# Patient Record
Sex: FEMALE | Race: BLACK OR AFRICAN AMERICAN | Employment: FULL TIME | URBAN - METROPOLITAN AREA
[De-identification: names, ages, dates, MRNs, and addresses within clinical notes are randomized per-mention and may not be internally consistent; named-entity substitution may affect disease eponyms.]

---

## 2017-01-05 ENCOUNTER — HOSPITAL ENCOUNTER (OUTPATIENT)
Dept: MAMMOGRAPHY | Age: 42
Discharge: HOME | End: 2017-01-05
Attending: INTERNAL MEDICINE
Payer: COMMERCIAL

## 2017-01-05 DIAGNOSIS — Z12.31 VISIT FOR SCREENING MAMMOGRAM: ICD-10-CM

## 2017-01-05 PROCEDURE — 77067 SCR MAMMO BI INCL CAD: CPT

## 2017-02-09 RX ORDER — HYDROCHLOROTHIAZIDE 12.5 MG/1
CAPSULE ORAL
Qty: 30 CAP | Refills: 1 | Status: SHIPPED | OUTPATIENT
Start: 2017-02-09 | End: 2017-04-10 | Stop reason: SDUPTHER

## 2017-06-21 ENCOUNTER — OFFICE VISIT (OUTPATIENT)
Dept: NEUROLOGY | Age: 42
End: 2017-06-21

## 2017-06-21 VITALS
SYSTOLIC BLOOD PRESSURE: 110 MMHG | BODY MASS INDEX: 29.01 KG/M2 | RESPIRATION RATE: 18 BRPM | OXYGEN SATURATION: 98 % | HEART RATE: 74 BPM | TEMPERATURE: 98.8 F | DIASTOLIC BLOOD PRESSURE: 70 MMHG | WEIGHT: 169 LBS

## 2017-06-21 DIAGNOSIS — G43.109 MIGRAINE WITH VERTIGO: ICD-10-CM

## 2017-06-21 DIAGNOSIS — G43.709 CHRONIC MIGRAINE W/O AURA W/O STATUS MIGRAINOSUS, NOT INTRACTABLE: Primary | ICD-10-CM

## 2017-06-21 RX ORDER — ZOLMITRIPTAN 2.5 MG/1
1 SPRAY, METERED NASAL
Qty: 1 EACH | Refills: 0 | Status: SHIPPED | COMMUNITY
Start: 2017-06-21 | End: 2017-10-13 | Stop reason: SDUPTHER

## 2017-06-21 RX ORDER — NORTRIPTYLINE HYDROCHLORIDE 10 MG/1
CAPSULE ORAL
Qty: 60 CAP | Refills: 2 | Status: SHIPPED | OUTPATIENT
Start: 2017-06-21 | End: 2017-10-13 | Stop reason: SDUPTHER

## 2017-06-21 NOTE — PATIENT INSTRUCTIONS
[facility]   Statement to Patients  April 1, 2014      In an effort to ensure the large volume of patient prescription refills is processed in the most efficient and expeditious manner, we are asking our patients to assist us by calling your Pharmacy for all prescription refills, this will include also your  Mail Order Pharmacy. The pharmacy will contact our office electronically to continue the refill process. Please do not wait until the last minute to call your pharmacy. We need at least 48 hours (2days) to fill prescriptions. We also encourage you to call your pharmacy before going to  your prescription to make sure it is ready. With regard to controlled substance prescription refill requests (narcotic refills) that need to be picked up at our office, we ask your cooperation by providing us with at least 72 hours (3days) notice that you will need a refill. We will not refill narcotic prescription refill requests after 4:00pm on any weekday, Monday through Thursday, or after 2:00pm on Fridays, or on the weekends. We encourage everyone to explore another way of getting your prescription refill request processed using [employer], our patient web portal through our electronic medical record system. [employer] is an efficient and effective way to communicate your medication request directly to the office and  downloadable as an rowena on your smart phone . [employer] also features a review functionality that allows you to view your medication list as well as leave messages for your physician. Are you ready to get connected? If so please review the attatched instructions or speak to any of our staff to get you set up right away! Thank you so much for your cooperation. Should you have any questions please contact our Practice Administrator.     The Physicians and Staff,  [facility]     Thank you for choosing [facility] and [facility] for your     care. You may receive a survey about your visit. We appreciate you taking time     to complete this survey as we use your feedback to improve our services. We     realize we are not perfect, but we strive to provide excellent care. Recurring Migraine Headache: Care Instructions  Your Care Instructions  Migraines are painful, throbbing headaches. They often start on one side of the head. They may cause nausea and vomiting and make you sensitive to light, sound, or smell. Some people may have only a few migraines throughout life. Others have them as often as several times a month. The goal of treatment is to reduce the number of migraines you have and relieve your symptoms. Even with treatment, you may continue to have migraines. You play an important role in dealing with your headaches. Work on avoiding things that seem to trigger your migraines. When you feel a headache coming on, act quickly to stop it before it gets worse. Follow-up care is a key part of your treatment and safety. Be sure to make and go to all appointments, and call your doctor if you are having problems. It's also a good idea to know your test results and keep a list of the medicines you take. How can you care for yourself at home? · Do not drive if you have taken a prescription pain medicine. · Rest in a quiet, dark room until your headache is gone. Close your eyes and try to relax or go to sleep. Do not watch TV or read. · Put a cold, moist cloth or cold pack on the painful area for 10 to 20 minutes at a time. Put a thin cloth between the cold pack and your skin. · Have someone gently massage your neck and shoulders. · Take your medicines exactly as prescribed. Call your doctor if you think you are having a problem with your medicine. You will get more details on the specific medicines your doctor prescribes.   To prevent migraines  · Keep a headache diary so you can figure out what triggers your headaches. Avoiding triggers may help you prevent headaches. Record when each headache began, how long it lasted, and what the pain was like. Use words like throbbing, aching, stabbing, or dull. Write down any other symptoms you had with the headache. These may include nausea, flashing lights or dark spots, or sensitivity to bright light or loud noise. Note if the headache occurred near your period. List anything that might have triggered the headache. Triggers may include certain foods (chocolate, cheese, wine) or odors, smoke, bright light, stress, or lack of sleep. · If your doctor has prescribed medicine for your migraines, take it as directed. You may have medicine that you take only when you get a migraine and medicine that you take all the time to help prevent migraines. ¨ If your doctor has prescribed medicine for when you get a headache, take it at the first sign of a migraine, unless your doctor has given you other instructions. ¨ If your doctor has prescribed medicine to prevent migraines, take it exactly as prescribed. Call your doctor if you think you are having a problem with your medicine. · Find healthy ways to deal with stress. Migraines are most common during or right after stressful times. Take time to relax before and after you do something that has caused a migraine in the past.  · Try to keep your muscles relaxed by keeping good posture. Check your jaw, face, neck, and shoulder muscles for tension. Try to relax them. When sitting at a desk, change positions often. Stretch for 30 seconds each hour. · Get regular sleep and exercise. · Eat regular meals, and avoid foods and drinks that often trigger migraines. These include chocolate and alcohol, especially red wine and port. Chemicals used in food, such as aspartame and monosodium glutamate (MSG), also can trigger migraines.  So can some food additives, such as those found in hot dogs, merchant, cold cuts, aged cheeses, and pickled foods.  · Limit caffeine by not drinking too much coffee, tea, or soda. Do not quit caffeine suddenly, because that can also give you migraines. · Do not smoke or allow others to smoke around you. If you need help quitting, talk to your doctor about stop-smoking programs and medicines. These can increase your chances of quitting for good. · If you are taking birth control pills or hormone therapy, talk to your doctor about whether they are triggering your migraines. When should you call for help? Call 911 anytime you think you may need emergency care. For example, call if:  · You have symptoms of a stroke. These may include:  ¨ Sudden numbness, tingling, weakness, or loss of movement in your face, arm, or leg, especially on only one side of your body. ¨ Sudden vision changes. ¨ Sudden trouble speaking. ¨ Sudden confusion or trouble understanding simple statements. ¨ Sudden problems with walking or balance. ¨ A sudden, severe headache that is different from past headaches. Call your doctor now or seek immediate medical care if:  · You develop a fever and a stiff neck. · You have new nausea and vomiting, or you cannot keep down food or liquids. Watch closely for changes in your health, and be sure to contact your doctor if:  · You have a headache that does not get better within 1 or 2 days. · Your headaches get worse or happen more often. Where can you learn more? Go to http://roly-sheela.info/. Enter V975 in the search box to learn more about \"Recurring Migraine Headache: Care Instructions. \"  Current as of: October 14, 2016  Content Version: 11.3  © 1457-5979 [employer]. Care instructions adapted under license by [employer] (which disclaims liability or warranty for this information).  If you have questions about a medical condition or this instruction, always ask your healthcare professional. [place] [address] any warranty or liability for your use of this information.

## 2017-06-21 NOTE — PROGRESS NOTES
Chief Complaint   Patient presents with    Migraine       Referred by: Dr. Sheldon RODRIGUEZ    Mike Soriano is a 78-year-old  with a history of hypertension and C-spine fusion here for headaches. She has a history of migraine headaches since she was approximately age 23 that over the years have progressed  And changed. In recent years her headaches are described as more a bilateral retro-orbital pressure with vertiginous symptoms. Worse with movement. Sometimes she does have bitemporal diffuse headache. She has light and sound sensitivity. She is tried Imitrex oral for severe headaches but that causes her to feel sick. She is tried over-the-counter medications and Percocet. No preceding aura. Sleep is challenging. Symptoms are worse during her menstrual cycle. Review of Systems   Eyes: Positive for photophobia. Neurological: Positive for dizziness and headaches. Psychiatric/Behavioral: The patient has insomnia. All other systems reviewed and are negative. Past Medical History:   Diagnosis Date    Dense breasts     GERD (gastroesophageal reflux disease)     Headache     Hypertension     Ill-defined condition     heart murmer    Nausea & vomiting     Sinus congestion      Family History   Problem Relation Age of Onset    Breast Cancer Mother 79     Social History     Social History    Marital status: SINGLE     Spouse name: N/A    Number of children: N/A    Years of education: N/A     Occupational History    Not on file.      Social History Main Topics    Smoking status: Never Smoker    Smokeless tobacco: Never Used    Alcohol use 0.6 oz/week     0 Standard drinks or equivalent, 1 Shots of liquor per week    Drug use: No    Sexual activity: Not on file     Other Topics Concern    Not on file     Social History Narrative     Current Outpatient Prescriptions   Medication Sig    nortriptyline (PAMELOR) 10 mg capsule 1 capsule at bedtime and increase to 2 capsules after 1 week    ZOLMitriptan (ZOMIG) 2.5 mg spry 1 Spray by Nasal route once as needed for up to 1 dose.  hydroCHLOROthiazide (MICROZIDE) 12.5 mg capsule TAKE ONE CAPSULE BY MOUTH EVERY DAY    herbal drugs (COLON HERBAL CLEANSER) cap Take 2 Caps by mouth daily.  omeprazole (PRILOSEC) 20 mg capsule Take 20 mg by mouth daily. Indications: GASTROESOPHAGEAL REFLUX    multivitamin (ONE A DAY) tablet Take 1 Tab by mouth daily. Indications: VITAMIN DEFICIENCY PREVENTION    oxyCODONE-acetaminophen (PERCOCET) 5-325 mg per tablet Take 1-2 Tabs by mouth every four (4) hours as needed for Pain. Max Daily Amount: 12 Tabs.  ondansetron (ZOFRAN ODT) 4 mg disintegrating tablet Take 1 Tab by mouth every eight (8) hours as needed for Nausea. No current facility-administered medications for this visit. Allergies   Allergen Reactions    Metronidazole Rash    Stadol [Butorphanol Tartrate] Palpitations         Neurologic Exam     Mental Status   Oriented to person, place, and time. Speech: speech is normal   Level of consciousness: alert    Cranial Nerves   Cranial nerves II through XII intact. Motor Exam   Muscle bulk: normal  Overall muscle tone: normal    Strength   Strength 5/5 throughout. Sensory Exam   Light touch normal.     Gait, Coordination, and Reflexes     Gait  Gait: normal    Tremor   Resting tremor: absent    Physical Exam   Constitutional: She is oriented to person, place, and time. She appears well-developed and well-nourished. Cardiovascular: Normal rate. Pulmonary/Chest: Effort normal.   Neurological: She is oriented to person, place, and time. She has normal strength. Gait normal.   Skin: Skin is warm and dry. Psychiatric: She has a normal mood and affect. Her speech is normal and behavior is normal.   Vitals reviewed.     Visit Vitals    /70    Pulse 74    Temp 98.8 °F (37.1 °C)    Resp 18    Wt 76.7 kg (169 lb)    SpO2 98%    BMI 29.01 kg/m2       Lab Results  Component Value Date/Time   WBC 9.8 09/30/2016 11:42 AM   HGB 12.9 09/30/2016 11:42 AM   HCT 39.7 09/30/2016 11:42 AM   PLATELET 257 27/87/8967 11:42 AM   MCV 88.0 09/30/2016 11:42 AM     Lab Results  Component Value Date/Time   Hemoglobin A1c 5.9 12/22/2015 03:53 PM   Glucose 84 09/30/2016 11:42 AM   LDL, calculated 112 12/22/2015 03:53 PM   Creatinine 0.88 09/30/2016 11:42 AM      Lab Results  Component Value Date/Time   Cholesterol, total 196 12/22/2015 03:53 PM   HDL Cholesterol 51 12/22/2015 03:53 PM   LDL, calculated 112 12/22/2015 03:53 PM   Triglyceride 163 12/22/2015 03:53 PM   Lab Results  Component Value Date/Time   ALT (SGPT) 10 12/22/2015 03:53 PM   AST (SGOT) 14 12/22/2015 03:53 PM   Alk. phosphatase 45 12/22/2015 03:53 PM   Bilirubin, total <0.2 12/22/2015 03:53 PM   Albumin 3.6 12/22/2015 03:53 PM   Protein, total 6.1 12/22/2015 03:53 PM   PLATELET 599 66/40/4151 11:42 AM                    CT Results (maximum last 3): No results found for this or any previous visit. MRI Results (maximum last 3): Results from [place] encounter on 09/14/16   MRI CERV SPINE WO CONT   Narrative INDICATION: neck pain neck pain M 54.2 cm at base of neck with shooting pains. Symptoms over last 2 months. COMPARISON: None    EXAM: Sagittal T1-weighted spin-echo, sagittal T2-weighted fast spin-echo,  sagittal inversion recovery, axial T1-weighted spin-echo and axial gradient echo  MR images of the cervical spine are obtained. The study was performed on an open  configuration 0.3 Marisela (low field) strength MR imaging system. FINDINGS: Cord signal is normal. The visualized portions of the brainstem and  cerebellum are normal. There is normal vertebral body height and bone signal.  There is straightening of cervical lordosis with borderline retrolisthesis at  C3-4 and borderline anterolisthesis at C5-6. No paraspinal soft tissue mass is  demonstrated. C2-3: Mild diminished disc height.  Moderate left paracentral disc herniation  with effacement of anterior CSF space and contact with the anterior margin of  cord with subtle flattening. No facet arthropathy or foraminal stenosis. C3-4: Mild-moderate disc space narrowing. Moderate-sized left paracentral disc  herniation with posterior displacement of cord and mild-moderate left  paracentral and anterolateral cord compression. Mild right facet osteoarthrosis. Mild right foraminal stenosis. C4-5: Nearly normal disc height. Mild central disc bulging. No facet  arthropathy, canal stenosis or foraminal stenosis. C5-6: Mild disc space narrowing. Large central and right paracentral disc  herniation with severe cord compression, greater on the right. No facet  arthropathy. No substantial foraminal stenosis. C6-7: Nearly normal disc height. Small to moderate central disc herniation  effacing the anterior CSF space and mildly displacing the cord posteriorly  though without compression. No facet arthropathy or foraminal stenosis. C7-T1 and T1-2: Normal discs and facets. Impression IMPRESSION:   1. Large central and right paracentral disc herniation at C5-6 with severe cord  compression. 2. Moderate left paracentral disc herniation at C3-4 with mild-moderate cord  compression. 3. Moderate left paracentral disc herniation at C2-3 with minimal anterior cord  compression. 4. Small-moderate central disc herniation at C6-7 without cord compression. PET Results (maximum last 3): No results found for this or any previous visit. Assessment and Plan   Deidra Rodriguez was seen today for migraine.     Diagnoses and all orders for this visit:    Chronic migraine w/o aura w/o status migrainosus, not intractable    Migraine with vertigo    Other orders  -     nortriptyline (PAMELOR) 10 mg capsule; 1 capsule at bedtime and increase to 2 capsules after 1 week  -     ZOLMitriptan (ZOMIG) 2.5 mg spry; 1 Spray by Nasal route once as needed for up to 1 dose.      43-year-old woman with chronic migraine headaches. She is having 2 or 3 events per week. I recommend a trial of nortriptyline at bedtime which may also help improve her sleep quality. Side effects discussed. A trial of Zomig nasal spray since the oral Imitrex caused worsening symptoms. She has a normal exam and no headache change over the past few years. No need for imaging at this time. I would like her to follow-up in 3 months. A notice of this visit/encounter being completed has been sent electronically to the patient's PCP and/or referring provider.      2 [facility] [address]  Diplomate ROSEMARYN

## 2017-06-21 NOTE — MR AVS SNAPSHOT
Visit Information Date & Time Provider Department Dept. Phone Encounter #  
 6/21/2017  8:00 AM Kg Patel DO [facility] at [address] [id number] Follow-up Instructions Return in about 3 months (around 9/21/2017). Routing History Upcoming Health Maintenance Date Due DTaP/Tdap/Td series (1 - Tdap) 10/9/1994 PAP AKA CERVICAL CYTOLOGY 10/9/1994 INFLUENZA AGE 9 TO ADULT 8/1/2017 Allergies as of 6/21/2017  Review Complete On: 6/21/2017 By: Frederick Owens LPN Severity Noted Reaction Type Reactions Metronidazole  12/22/2015    Rash Stadol [Butorphanol Tartrate]  12/22/2015    Palpitations Current Immunizations  Never Reviewed No immunizations on file. Not reviewed this visit You Were Diagnosed With   
  
 Codes Comments Chronic migraine w/o aura w/o status migrainosus, not intractable    -  Primary ICD-10-CM: D00.378 ICD-9-CM: 346.70 Migraine with vertigo     ICD-10-CM: G43.109 ICD-9-CM: 346.00 Vitals BP Pulse Temp Resp Weight(growth percentile) SpO2  
 110/70 74 98.8 °F (37.1 °C) 18 169 lb (76.7 kg) 98% BMI OB Status Smoking Status 29.01 kg/m2 Having regular periods Never Smoker Vitals History BMI and BSA Data Body Mass Index Body Surface Area  
 29.01 kg/m 2 1.86 m 2 Preferred Pharmacy Pharmacy Name Phone [id number] Zeb Monique, [address] [place] [address] [phone] Your Updated Medication List  
  
   
This list is accurate as of: 6/21/17  8:31 AM.  Always use your most recent med list.  
  
  
  
  
 COLON HERBAL CLEANSER Cap Generic drug:  herbal drugs Take 2 Caps by mouth daily. hydroCHLOROthiazide 12.5 mg capsule Commonly known as:  Velton Shadow TAKE ONE CAPSULE BY MOUTH EVERY DAY  
  
 multivitamin tablet Commonly known as:  ONE A DAY  
 Take 1 Tab by mouth daily. Indications: VITAMIN DEFICIENCY PREVENTION  
  
 nortriptyline 10 mg capsule Commonly known as:  PAMELOR  
1 capsule at bedtime and increase to 2 capsules after 1 week  
  
 omeprazole 20 mg capsule Commonly known as:  PRILOSEC Take 20 mg by mouth daily. Indications: GASTROESOPHAGEAL REFLUX  
  
 ondansetron 4 mg disintegrating tablet Commonly known as:  ZOFRAN ODT Take 1 Tab by mouth every eight (8) hours as needed for Nausea. oxyCODONE-acetaminophen 5-325 mg per tablet Commonly known as:  PERCOCET Take 1-2 Tabs by mouth every four (4) hours as needed for Pain. Max Daily Amount: 12 Tabs. ZOLMitriptan 2.5 mg Spry Commonly known as:  ZOMIG  
1 Spray by Nasal route once as needed for up to 1 dose. Prescriptions Sent to Pharmacy Refills  
 nortriptyline (PAMELOR) 10 mg capsule 2 Si capsule at bedtime and increase to 2 capsules after 1 week Class: Normal  
 Pharmacy: [facility] [address] [place] [address], [address] [place] [address] #: [phone] Follow-up Instructions Return in about 3 months (around 2017). Patient Instructions PRESCRIPTION REFILL POLICY [facility] Statement to Patients 2014 In an effort to ensure the large volume of patient prescription refills is processed in the most efficient and expeditious manner, we are asking our patients to assist us by calling your Pharmacy for all prescription refills, this will include also your  Mail Order Pharmacy. The pharmacy will contact our office electronically to continue the refill process. Please do not wait until the last minute to call your pharmacy. We need at least 48 hours (2days) to fill prescriptions. We also encourage you to call your pharmacy before going to  your prescription to make sure it is ready. With regard to controlled substance prescription refill requests (narcotic refills) that need to be picked up at our office, we ask your cooperation by providing us with at least 72 hours (3days) notice that you will need a refill. We will not refill narcotic prescription refill requests after 4:00pm on any weekday, Monday through Thursday, or after 2:00pm on Fridays, or on the weekends. We encourage everyone to explore another way of getting your prescription refill request processed using [employer], our patient web portal through our electronic medical record system. The [employer]ebra is an efficient and effective way to communicate your medication request directly to the office and  downloadable as an rowena on your smart phone . The [employer]ebra also features a review functionality that allows you to view your medication list as well as leave messages for your physician. Are you ready to get connected? If so please review the attatched instructions or speak to any of our staff to get you set up right away! Thank you so much for your cooperation. Should you have any questions please contact our Practice Administrator. The Physicians and Staff,  [facility] Thank you for choosing [facility] and [facility] for your  
 
care. You may receive a survey about your visit. We appreciate you taking time  
 
to complete this survey as we use your feedback to improve our services. We  
 
realize we are not perfect, but we strive to provide excellent care. Recurring Migraine Headache: Care Instructions Your Care Instructions Migraines are painful, throbbing headaches. They often start on one side of the head. They may cause nausea and vomiting and make you sensitive to light, sound, or smell. Some people may have only a few migraines throughout life. Others have them as often as several times a month.  
The goal of treatment is to reduce the number of migraines you have and relieve your symptoms. Even with treatment, you may continue to have migraines. You play an important role in dealing with your headaches. Work on avoiding things that seem to trigger your migraines. When you feel a headache coming on, act quickly to stop it before it gets worse. Follow-up care is a key part of your treatment and safety. Be sure to make and go to all appointments, and call your doctor if you are having problems. It's also a good idea to know your test results and keep a list of the medicines you take. How can you care for yourself at home? · Do not drive if you have taken a prescription pain medicine. · Rest in a quiet, dark room until your headache is gone. Close your eyes and try to relax or go to sleep. Do not watch TV or read. · Put a cold, moist cloth or cold pack on the painful area for 10 to 20 minutes at a time. Put a thin cloth between the cold pack and your skin. · Have someone gently massage your neck and shoulders. · Take your medicines exactly as prescribed. Call your doctor if you think you are having a problem with your medicine. You will get more details on the specific medicines your doctor prescribes. To prevent migraines · Keep a headache diary so you can figure out what triggers your headaches. Avoiding triggers may help you prevent headaches. Record when each headache began, how long it lasted, and what the pain was like. Use words like throbbing, aching, stabbing, or dull. Write down any other symptoms you had with the headache. These may include nausea, flashing lights or dark spots, or sensitivity to bright light or loud noise. Note if the headache occurred near your period. List anything that might have triggered the headache. Triggers may include certain foods (chocolate, cheese, wine) or odors, smoke, bright light, stress, or lack of sleep.  
· If your doctor has prescribed medicine for your migraines, take it as directed. You may have medicine that you take only when you get a migraine and medicine that you take all the time to help prevent migraines. ¨ If your doctor has prescribed medicine for when you get a headache, take it at the first sign of a migraine, unless your doctor has given you other instructions. ¨ If your doctor has prescribed medicine to prevent migraines, take it exactly as prescribed. Call your doctor if you think you are having a problem with your medicine. · Find healthy ways to deal with stress. Migraines are most common during or right after stressful times. Take time to relax before and after you do something that has caused a migraine in the past. 
· Try to keep your muscles relaxed by keeping good posture. Check your jaw, face, neck, and shoulder muscles for tension. Try to relax them. When sitting at a desk, change positions often. Stretch for 30 seconds each hour. · Get regular sleep and exercise. · Eat regular meals, and avoid foods and drinks that often trigger migraines. These include chocolate and alcohol, especially red wine and port. Chemicals used in food, such as aspartame and monosodium glutamate (MSG), also can trigger migraines. So can some food additives, such as those found in hot dogs, merchant, cold cuts, aged cheeses, and pickled foods. · Limit caffeine by not drinking too much coffee, tea, or soda. Do not quit caffeine suddenly, because that can also give you migraines. · Do not smoke or allow others to smoke around you. If you need help quitting, talk to your doctor about stop-smoking programs and medicines. These can increase your chances of quitting for good. · If you are taking birth control pills or hormone therapy, talk to your doctor about whether they are triggering your migraines. When should you call for help? Call 911 anytime you think you may need emergency care. For example, call if: 
· You have symptoms of a stroke. These may include: ¨ Sudden numbness, tingling, weakness, or loss of movement in your face, arm, or leg, especially on only one side of your body. ¨ Sudden vision changes. ¨ Sudden trouble speaking. ¨ Sudden confusion or trouble understanding simple statements. ¨ Sudden problems with walking or balance. ¨ A sudden, severe headache that is different from past headaches. Call your doctor now or seek immediate medical care if: 
· You develop a fever and a stiff neck. · You have new nausea and vomiting, or you cannot keep down food or liquids. Watch closely for changes in your health, and be sure to contact your doctor if: 
· You have a headache that does not get better within 1 or 2 days. · Your headaches get worse or happen more often. Where can you learn more? Go to http://roly-sheela.info/. Enter V975 in the search box to learn more about \"Recurring Migraine Headache: Care Instructions. \" Current as of: October 14, 2016 Content Version: 11.3 © 1402-9471 [employer]. Care instructions adapted under license by [employer] (which disclaims liability or warranty for this information). If you have questions about a medical condition or this instruction, always ask your healthcare professional. [place] [address] any warranty or liability for your use of this information. Introducing [facility]! Gerald Lucio introduces [employer] patient portal. Now you can access parts of your medical record, email your doctor's office, and request medication refills online. 1. In your internet browser, go to [email or web address]. [email or web address] 2. Click on the First Time User? Click Here link in the Sign In box. You will see the New Member Sign Up page. 3. Enter your [employer] Access Code exactly as it appears below. You will not need to use this code after youve completed the sign-up process.  If you do not sign up before the expiration date, you must request a new code. 
 
· [employer] Access Code: [email or web address] Expires: 9/19/2017  8:06 AM 
 
4. Enter the last four digits of your Social Security Number (xxxx) and Date of Birth (mm/dd/yyyy) as indicated and click Submit. You will be taken to the next sign-up page. 5. Create a [employer] ID. This will be your [employer] login ID and cannot be changed, so think of one that is secure and easy to remember. 6. Create a [employer] password. You can change your password at any time. 7. Enter your Password Reset Question and Answer. This can be used at a later time if you forget your password. 8. Enter your e-mail address. You will receive e-mail notification when new information is available in [address] 9. Click Sign Up. You can now view and download portions of your medical record. 10. Click the Download Summary menu link to download a portable copy of your medical information. If you have questions, please visit the Frequently Asked Questions section of the [employer] website. Remember, [employer] is NOT to be used for urgent needs. For medical emergencies, dial 911. Now available from your iPhone and Android! Please provide this summary of care documentation to your next provider. Your primary care clinician is listed as Clare Ferrara. If you have any questions after today's visit, please call [phone].

## 2017-08-15 ENCOUNTER — HOSPITAL ENCOUNTER (EMERGENCY)
Age: 42
Discharge: HOME | End: 2017-08-15
Attending: EMERGENCY MEDICINE | Admitting: EMERGENCY MEDICINE
Payer: COMMERCIAL

## 2017-08-15 VITALS
DIASTOLIC BLOOD PRESSURE: 78 MMHG | WEIGHT: 175.6 LBS | HEART RATE: 74 BPM | OXYGEN SATURATION: 100 % | HEIGHT: 64 IN | RESPIRATION RATE: 18 BRPM | SYSTOLIC BLOOD PRESSURE: 122 MMHG | BODY MASS INDEX: 29.98 KG/M2 | TEMPERATURE: 98.1 F

## 2017-08-15 DIAGNOSIS — H10.11 ACUTE ATOPIC CONJUNCTIVITIS OF RIGHT EYE: ICD-10-CM

## 2017-08-15 DIAGNOSIS — I10 ESSENTIAL HYPERTENSION: ICD-10-CM

## 2017-08-15 DIAGNOSIS — R51.9 HEADACHE, UNSPECIFIED HEADACHE TYPE: Primary | ICD-10-CM

## 2017-08-15 LAB
ALBUMIN SERPL BCP-MCNC: 3.1 G/DL (ref 3.5–5)
ALBUMIN/GLOB SERPL: 0.8 {RATIO} (ref 1.1–2.2)
ALP SERPL-CCNC: 49 U/L (ref 45–117)
ALT SERPL-CCNC: 18 U/L (ref 12–78)
ANION GAP BLD CALC-SCNC: 6 MMOL/L (ref 5–15)
APPEARANCE UR: ABNORMAL
AST SERPL W P-5'-P-CCNC: 44 U/L (ref 15–37)
BASOPHILS # BLD AUTO: 0 K/UL (ref 0–0.1)
BASOPHILS # BLD: 0 % (ref 0–1)
BILIRUB SERPL-MCNC: 0.2 MG/DL (ref 0.2–1)
BILIRUB UR QL: NEGATIVE
BUN SERPL-MCNC: 10 MG/DL (ref 6–20)
BUN/CREAT SERPL: 10 (ref 12–20)
CALCIUM SERPL-MCNC: 8.4 MG/DL (ref 8.5–10.1)
CHLORIDE SERPL-SCNC: 104 MMOL/L (ref 97–108)
CO2 SERPL-SCNC: 24 MMOL/L (ref 21–32)
COLOR UR: ABNORMAL
CREAT SERPL-MCNC: 1 MG/DL (ref 0.55–1.02)
EOSINOPHIL # BLD: 0.1 K/UL (ref 0–0.4)
EOSINOPHIL NFR BLD: 1 % (ref 0–7)
ERYTHROCYTE [DISTWIDTH] IN BLOOD BY AUTOMATED COUNT: 14 % (ref 11.5–14.5)
GLOBULIN SER CALC-MCNC: 4 G/DL (ref 2–4)
GLUCOSE SERPL-MCNC: 88 MG/DL (ref 65–100)
GLUCOSE UR STRIP.AUTO-MCNC: NEGATIVE MG/DL
HCT VFR BLD AUTO: 37.6 % (ref 35–47)
HGB BLD-MCNC: 12 G/DL (ref 11.5–16)
HGB UR QL STRIP: NEGATIVE
KETONES UR QL STRIP.AUTO: NEGATIVE MG/DL
LEUKOCYTE ESTERASE UR QL STRIP.AUTO: NEGATIVE
LYMPHOCYTES # BLD AUTO: 27 % (ref 12–49)
LYMPHOCYTES # BLD: 2.3 K/UL (ref 0.8–3.5)
MCH RBC QN AUTO: 27.9 PG (ref 26–34)
MCHC RBC AUTO-ENTMCNC: 31.9 G/DL (ref 30–36.5)
MCV RBC AUTO: 87.4 FL (ref 80–99)
MONOCYTES # BLD: 0.6 K/UL (ref 0–1)
MONOCYTES NFR BLD AUTO: 7 % (ref 5–13)
NEUTS SEG # BLD: 5.5 K/UL (ref 1.8–8)
NEUTS SEG NFR BLD AUTO: 65 % (ref 32–75)
NITRITE UR QL STRIP.AUTO: NEGATIVE
PH UR STRIP: 6.5 [PH] (ref 5–8)
PLATELET # BLD AUTO: 297 K/UL (ref 150–400)
POTASSIUM SERPL-SCNC: 4.9 MMOL/L (ref 3.5–5.1)
PROT SERPL-MCNC: 7.1 G/DL (ref 6.4–8.2)
PROT UR STRIP-MCNC: NEGATIVE MG/DL
RBC # BLD AUTO: 4.3 M/UL (ref 3.8–5.2)
SODIUM SERPL-SCNC: 134 MMOL/L (ref 136–145)
SP GR UR REFRACTOMETRY: 1.01 (ref 1–1.03)
UROBILINOGEN UR QL STRIP.AUTO: 0.2 EU/DL (ref 0.2–1)
WBC # BLD AUTO: 8.5 K/UL (ref 3.6–11)

## 2017-08-15 PROCEDURE — 36415 COLL VENOUS BLD VENIPUNCTURE: CPT | Performed by: EMERGENCY MEDICINE

## 2017-08-15 PROCEDURE — 80053 COMPREHEN METABOLIC PANEL: CPT | Performed by: EMERGENCY MEDICINE

## 2017-08-15 PROCEDURE — 81003 URINALYSIS AUTO W/O SCOPE: CPT | Performed by: EMERGENCY MEDICINE

## 2017-08-15 PROCEDURE — 74011250637 HC RX REV CODE- 250/637: Performed by: EMERGENCY MEDICINE

## 2017-08-15 PROCEDURE — 85025 COMPLETE CBC W/AUTO DIFF WBC: CPT | Performed by: EMERGENCY MEDICINE

## 2017-08-15 PROCEDURE — 96361 HYDRATE IV INFUSION ADD-ON: CPT

## 2017-08-15 PROCEDURE — 99284 EMERGENCY DEPT VISIT MOD MDM: CPT

## 2017-08-15 PROCEDURE — 74011250636 HC RX REV CODE- 250/636: Performed by: EMERGENCY MEDICINE

## 2017-08-15 PROCEDURE — 96374 THER/PROPH/DIAG INJ IV PUSH: CPT

## 2017-08-15 PROCEDURE — 96375 TX/PRO/DX INJ NEW DRUG ADDON: CPT

## 2017-08-15 RX ORDER — CETIRIZINE HCL 10 MG
10 TABLET ORAL
Status: COMPLETED | OUTPATIENT
Start: 2017-08-15 | End: 2017-08-15

## 2017-08-15 RX ORDER — KETOROLAC TROMETHAMINE 30 MG/ML
30 INJECTION, SOLUTION INTRAMUSCULAR; INTRAVENOUS
Status: COMPLETED | OUTPATIENT
Start: 2017-08-15 | End: 2017-08-15

## 2017-08-15 RX ORDER — PROCHLORPERAZINE EDISYLATE 5 MG/ML
10 INJECTION INTRAMUSCULAR; INTRAVENOUS
Status: COMPLETED | OUTPATIENT
Start: 2017-08-15 | End: 2017-08-15

## 2017-08-15 RX ORDER — DIPHENHYDRAMINE HYDROCHLORIDE 50 MG/ML
12.5 INJECTION, SOLUTION INTRAMUSCULAR; INTRAVENOUS
Status: COMPLETED | OUTPATIENT
Start: 2017-08-15 | End: 2017-08-15

## 2017-08-15 RX ADMIN — CETIRIZINE HYDROCHLORIDE 10 MG: 10 TABLET, FILM COATED ORAL at 14:16

## 2017-08-15 RX ADMIN — PROCHLORPERAZINE EDISYLATE 10 MG: 5 INJECTION INTRAMUSCULAR; INTRAVENOUS at 14:17

## 2017-08-15 RX ADMIN — KETOROLAC TROMETHAMINE 30 MG: 30 INJECTION, SOLUTION INTRAMUSCULAR at 14:17

## 2017-08-15 RX ADMIN — METHYLPREDNISOLONE SODIUM SUCCINATE 125 MG: 125 INJECTION, POWDER, FOR SOLUTION INTRAMUSCULAR; INTRAVENOUS at 14:17

## 2017-08-15 RX ADMIN — SODIUM CHLORIDE 1000 ML: 900 INJECTION, SOLUTION INTRAVENOUS at 14:16

## 2017-08-15 RX ADMIN — DIPHENHYDRAMINE HYDROCHLORIDE 12.5 MG: 50 INJECTION, SOLUTION INTRAMUSCULAR; INTRAVENOUS at 14:17

## 2017-08-15 NOTE — ED NOTES
Pt conts to remain drowsy, Pt awakens to verbal requests, Pt is requesting to wait till 1700 hours to reevaluate, will check at 1700 to see Pts status.

## 2017-08-15 NOTE — ED PROVIDER NOTES
Patient is a 35 y.o. female presenting with hypertension and eye problem. Hypertension    Associated symptoms include headaches. Pertinent negatives include no neck pain, no shortness of breath and no vomiting. Eye Problem    Associated symptoms include discharge. Pertinent negatives include no vomiting. Pt states that she has had a migraine headache for 2-3 days accompanied by intermittent right eye watering and elevated blood pressure. Denies fever, cold symptoms, neck pain, visual changes, focal weakness or rash. Denies any difficulty breathing, difficulty swallowing, SOB, chest pain or abdominal pain. Denies any nausea, vomiting or diarrhea. Pt. Reports taking excedrin without relief. Old charts reviewed. Past Medical History:   Diagnosis Date    Dense breasts     GERD (gastroesophageal reflux disease)     Headache     Hypertension     Ill-defined condition     heart murmer    Nausea & vomiting     Sinus congestion        Past Surgical History:   Procedure Laterality Date    HX BUNIONECTOMY Bilateral     HX GYN  2002    D&C    US GUIDE ASP BREAST RT CYST W NDL Right 4 years ago ? Benign         Family History:   Problem Relation Age of Onset    Breast Cancer Mother 79       Social History     Social History    Marital status: SINGLE     Spouse name: N/A    Number of children: N/A    Years of education: N/A     Occupational History    Not on file. Social History Main Topics    Smoking status: Never Smoker    Smokeless tobacco: Never Used    Alcohol use 0.6 oz/week     0 Standard drinks or equivalent, 1 Shots of liquor per week    Drug use: No    Sexual activity: Not on file     Other Topics Concern    Not on file     Social History Narrative         ALLERGIES: Metronidazole and Stadol [butorphanol tartrate]    Review of Systems   Constitutional: Negative for activity change and appetite change. HENT: Negative for facial swelling, sore throat and trouble swallowing. Eyes: Positive for discharge. Respiratory: Negative for shortness of breath. Cardiovascular: Negative. Gastrointestinal: Negative for abdominal pain, diarrhea and vomiting. Genitourinary: Negative for dysuria. Musculoskeletal: Negative for back pain and neck pain. Skin: Negative for color change. Neurological: Positive for headaches. Psychiatric/Behavioral: Negative. Vitals:    08/15/17 1328   BP: 158/88   Pulse: 70   Resp: 16   Temp: 98.6 °F (37 °C)   SpO2: 97%   Weight: 79.7 kg (175 lb 9.6 oz)   Height: 5' 4\" (1.626 m)            Physical Exam   Constitutional: She is oriented to person, place, and time. She appears well-nourished. Black female; non smoker   HENT:   Head: Normocephalic. Mouth/Throat: Oropharynx is clear and moist.   Eyes: EOM are normal. Pupils are equal, round, and reactive to light. Right eye exhibits no discharge. Left eye exhibits no discharge. Mild conjunctival injection right eye    Neck: Normal range of motion. Neck supple. Cardiovascular: Normal rate and regular rhythm. Pulmonary/Chest: Effort normal and breath sounds normal.   Abdominal: Soft. Bowel sounds are normal.   Musculoskeletal: Normal range of motion. Neurological: She is alert and oriented to person, place, and time. Skin: Skin is warm and dry. No rash noted. Nursing note and vitals reviewed. MDM  ED Course       Procedures    Pt has been re-examined and is feeling much better; reports headache free. 2:56 PM  Patient's results and plan of care have been reviewed with her. Patient has verbally conveyed her understanding and agreement of her signs, symptoms, diagnosis, treatment and prognosis and additionally agrees to follow up as recommended or return to the Emergency Room should her condition change prior to follow-up.   Discharge instructions have also been provided to the patient with some educational information regarding her diagnosis as well a list of reasons why she would want to return to the ER prior to her follow-up appointment should her condition change. Liseth Wilkinson NP

## 2017-08-15 NOTE — DISCHARGE INSTRUCTIONS
We hope that we have addressed all of your medical concerns. The examination and treatment you received in the Emergency Department were for an emergent problem and were not intended as complete care. It is important that you follow up with your healthcare provider(s) for ongoing care. If your symptoms worsen or do not improve as expected, and you are unable to reach your usual health care provider(s), you should return to the Emergency Department. Today's healthcare is undergoing tremendous change, and patient satisfaction surveys are one of the many tools to assess the quality of medical care. You may receive a survey from the [facility] regarding your experience in the Emergency Department. I hope that your experience has been completely positive, particularly the medical care that I provided. As such, please participate in the survey; anything less than excellent does not meet my expectations or intentions. [address] [place] [address] participate in nationally recognized quality of care measures. If your blood pressure is greater than 120/80, as reported below, we urge that you seek medical care to address the potential of high blood pressure, commonly known as hypertension. Hypertension can be hereditary or can be caused by certain medical conditions, pain, stress, or \"white coat syndrome. \"       Please make an appointment with your health care provider(s) for follow up of your Emergency Department visit. VITALS:   Patient Vitals for the past 8 hrs:   Temp Pulse Resp BP SpO2   08/15/17 1328 98.6 °F (37 °C) 70 16 158/88 97 %          Thank you for allowing us to provide you with medical care today. We realize that you have many choices for your emergency care needs. Please choose us in the future for any continued health care needs.       Jozef Ceballos NP    [facility].   Office: [phone]            Recent Results (from the past 24 hour(s))   URINALYSIS W/ RFLX MICROSCOPIC    Collection Time: 08/15/17  2:10 PM   Result Value Ref Range    Color YELLOW/STRAW      Appearance CLOUDY (A) CLEAR      Specific gravity 1.006 1.003 - 1.030      pH (UA) 6.5 5.0 - 8.0      Protein NEGATIVE  NEG mg/dL    Glucose NEGATIVE  NEG mg/dL    Ketone NEGATIVE  NEG mg/dL    Bilirubin NEGATIVE  NEG      Blood NEGATIVE  NEG      Urobilinogen 0.2 0.2 - 1.0 EU/dL    Nitrites NEGATIVE  NEG      Leukocyte Esterase NEGATIVE  NEG     CBC WITH AUTOMATED DIFF    Collection Time: 08/15/17  2:10 PM   Result Value Ref Range    WBC 8.5 3.6 - 11.0 K/uL    RBC 4.30 3.80 - 5.20 M/uL    HGB 12.0 11.5 - 16.0 g/dL    HCT 37.6 35.0 - 47.0 %    MCV 87.4 80.0 - 99.0 FL    MCH 27.9 26.0 - 34.0 PG    MCHC 31.9 30.0 - 36.5 g/dL    RDW 14.0 11.5 - 14.5 %    PLATELET 944 686 - 677 K/uL    NEUTROPHILS 65 32 - 75 %    LYMPHOCYTES 27 12 - 49 %    MONOCYTES 7 5 - 13 %    EOSINOPHILS 1 0 - 7 %    BASOPHILS 0 0 - 1 %    ABS. NEUTROPHILS 5.5 1.8 - 8.0 K/UL    ABS. LYMPHOCYTES 2.3 0.8 - 3.5 K/UL    ABS. MONOCYTES 0.6 0.0 - 1.0 K/UL    ABS. EOSINOPHILS 0.1 0.0 - 0.4 K/UL    ABS. BASOPHILS 0.0 0.0 - 0.1 K/UL   METABOLIC PANEL, COMPREHENSIVE    Collection Time: 08/15/17  2:10 PM   Result Value Ref Range    Sodium 134 (L) 136 - 145 mmol/L    Potassium 4.9 3.5 - 5.1 mmol/L    Chloride 104 97 - 108 mmol/L    CO2 24 21 - 32 mmol/L    Anion gap 6 5 - 15 mmol/L    Glucose 88 65 - 100 mg/dL    BUN 10 6 - 20 MG/DL    Creatinine 1.00 0.55 - 1.02 MG/DL    BUN/Creatinine ratio 10 (L) 12 - 20      GFR est AA >60 >60 ml/min/1.73m2    GFR est non-AA >60 >60 ml/min/1.73m2    Calcium 8.4 (L) 8.5 - 10.1 MG/DL    Bilirubin, total 0.2 0.2 - 1.0 MG/DL    ALT (SGPT) 18 12 - 78 U/L    AST (SGOT) 44 (H) 15 - 37 U/L    Alk.  phosphatase 49 45 - 117 U/L    Protein, total 7.1 6.4 - 8.2 g/dL    Albumin 3.1 (L) 3.5 - 5.0 g/dL    Globulin 4.0 2.0 - 4.0 g/dL    A-G Ratio 0.8 (L) 1.1 - 2.2         No results found. Pinkeye: Care Instructions  Your Care Instructions    Pinkeye is redness and swelling of the eye surface and the conjunctiva (the lining of the eyelid and the covering of the white part of the eye). Pinkeye is also called conjunctivitis. Pinkeye is often caused by infection with bacteria or a virus. Dry air, allergies, smoke, and chemicals are other common causes. Pinkeye often clears on its own in 7 to 10 days. Antibiotics only help if the pinkeye is caused by bacteria. Pinkeye caused by infection spreads easily. If an allergy or chemical is causing pinkeye, it will not go away unless you can avoid whatever is causing it. Follow-up care is a key part of your treatment and safety. Be sure to make and go to all appointments, and call your doctor if you are having problems. It's also a good idea to know your test results and keep a list of the medicines you take. How can you care for yourself at home? · Wash your hands often. Always wash them before and after you treat pinkeye or touch your eyes or face. · Use moist cotton or a clean, wet cloth to remove crust. Wipe from the inside corner of the eye to the outside. Use a clean part of the cloth for each wipe. · Put cold or warm wet cloths on your eye a few times a day if the eye hurts. · Do not wear contact lenses or eye makeup until the pinkeye is gone. Throw away any eye makeup you were using when you got pinkeye. Clean your contacts and storage case. If you wear disposable contacts, use a new pair when your eye has cleared and it is safe to wear contacts again. · If the doctor gave you antibiotic ointment or eyedrops, use them as directed. Use the medicine for as long as instructed, even if your eye starts looking better soon. Keep the bottle tip clean, and do not let it touch the eye area. · To put in eyedrops or ointment:  ¨ Tilt your head back, and pull your lower eyelid down with one finger.   ¨ Drop or squirt the medicine inside the lower lid. ¨ Close your eye for 30 to 60 seconds to let the drops or ointment move around. ¨ Do not touch the ointment or dropper tip to your eyelashes or any other surface. · Do not share towels, pillows, or washcloths while you have pinkeye. When should you call for help? Call your doctor now or seek immediate medical care if:  · You have pain in your eye, not just irritation on the surface. · You have a change in vision or loss of vision. · You have an increase in discharge from the eye. · Your eye has not started to improve or begins to get worse within 48 hours after you start using antibiotics. · Pinkeye lasts longer than 7 days. Watch closely for changes in your health, and be sure to contact your doctor if you have any problems. Where can you learn more? Go to http://roly-sheela.info/. Enter Y392 in the search box to learn more about \"Pinkeye: Care Instructions. \"  Current as of: March 20, 2017  Content Version: 11.3  © 6013-6218 [email or web address]. Care instructions adapted under license by [employer] (which disclaims liability or warranty for this information). If you have questions about a medical condition or this instruction, always ask your healthcare professional. [address] any warranty or liability for your use of this information. Headache: Care Instructions  Your Care Instructions    Headaches have many possible causes. Most headaches aren't a sign of a more serious problem, and they will get better on their own. Home treatment may help you feel better faster. The doctor has checked you carefully, but problems can develop later. If you notice any problems or new symptoms, get medical treatment right away. Follow-up care is a key part of your treatment and safety. Be sure to make and go to all appointments, and call your doctor if you are having problems.  It's also a good idea to know your test results and keep a list of the medicines you take. How can you care for yourself at home? · Do not drive if you have taken a prescription pain medicine. · Rest in a quiet, dark room until your headache is gone. Close your eyes and try to relax or go to sleep. Don't watch TV or read. · Put a cold, moist cloth or cold pack on the painful area for 10 to 20 minutes at a time. Put a thin cloth between the cold pack and your skin. · Use a warm, moist towel or a heating pad set on low to relax tight shoulder and neck muscles. · Have someone gently massage your neck and shoulders. · Take pain medicines exactly as directed. ¨ If the doctor gave you a prescription medicine for pain, take it as prescribed. ¨ If you are not taking a prescription pain medicine, ask your doctor if you can take an over-the-counter medicine. · Be careful not to take pain medicine more often than the instructions allow, because you may get worse or more frequent headaches when the medicine wears off. · Do not ignore new symptoms that occur with a headache, such as a fever, weakness or numbness, vision changes, or confusion. These may be signs of a more serious problem. To prevent headaches  · Keep a headache diary so you can figure out what triggers your headaches. Avoiding triggers may help you prevent headaches. Record when each headache began, how long it lasted, and what the pain was like (throbbing, aching, stabbing, or dull). Write down any other symptoms you had with the headache, such as nausea, flashing lights or dark spots, or sensitivity to bright light or loud noise. Note if the headache occurred near your period. List anything that might have triggered the headache, such as certain foods (chocolate, cheese, wine) or odors, smoke, bright light, stress, or lack of sleep. · Find healthy ways to deal with stress. Headaches are most common during or right after stressful times.  Take time to relax before and after you do something that has caused a headache in the past.  · Try to keep your muscles relaxed by keeping good posture. Check your jaw, face, neck, and shoulder muscles for tension, and try relaxing them. When sitting at a desk, change positions often, and stretch for 30 seconds each hour. · Get plenty of sleep and exercise. · Eat regularly and well. Long periods without food can trigger a headache. · Treat yourself to a massage. Some people find that regular massages are very helpful in relieving tension. · Limit caffeine by not drinking too much coffee, tea, or soda. But don't quit caffeine suddenly, because that can also give you headaches. · Reduce eyestrain from computers by blinking frequently and looking away from the computer screen every so often. Make sure you have proper eyewear and that your monitor is set up properly, about an arm's length away. · Seek help if you have depression or anxiety. Your headaches may be linked to these conditions. Treatment can both prevent headaches and help with symptoms of anxiety or depression. When should you call for help? Call 911 anytime you think you may need emergency care. For example, call if:  · You have signs of a stroke. These may include:  ¨ Sudden numbness, paralysis, or weakness in your face, arm, or leg, especially on only one side of your body. ¨ Sudden vision changes. ¨ Sudden trouble speaking. ¨ Sudden confusion or trouble understanding simple statements. ¨ Sudden problems with walking or balance. ¨ A sudden, severe headache that is different from past headaches. Call your doctor now or seek immediate medical care if:  · You have a new or worse headache. · Your headache gets much worse. Where can you learn more? Go to http://roly-sheela.info/. Enter M271 in the search box to learn more about \"Headache: Care Instructions. \"  Current as of: October 14, 2016  Content Version: 11.3  © 6222-0877 [employer], [facility].  Care instructions adapted under license by [employer] (which disclaims liability or warranty for this information). If you have questions about a medical condition or this instruction, always ask your healthcare professional. [address] any warranty or liability for your use of this information. Learning About High Blood Pressure  What is high blood pressure? Blood pressure is a measure of how hard the blood pushes against the walls of your arteries. It's normal for blood pressure to go up and down throughout the day, but if it stays up, you have high blood pressure. Another name for high blood pressure is hypertension. Two numbers tell you your blood pressure. The first number is the systolic pressure. It shows how hard the blood pushes when your heart is pumping. The second number is the diastolic pressure. It shows how hard the blood pushes between heartbeats, when your heart is relaxed and filling with blood. A blood pressure of less than 120/80 (say \"120 over 80\") is ideal for an adult. High blood pressure is 140/90 or higher. You have high blood pressure if your top number is 140 or higher or your bottom number is 90 or higher, or both. Many people fall into the category in between, called prehypertension. People with prehypertension need to make lifestyle changes to bring their blood pressure down and help prevent or delay high blood pressure. What happens when you have high blood pressure? · Blood flows through your arteries with too much force. Over time, this damages the walls of your arteries. But you can't feel it. High blood pressure usually doesn't cause symptoms. · Fat and calcium start to build up in your arteries. This buildup is called plaque. Plaque makes your arteries narrower and stiffer. Blood can't flow through them as easily. · This lack of good blood flow starts to damage some of the organs in your body.  This can lead to problems such as coronary artery disease and heart attack, heart failure, stroke, kidney failure, and eye damage. How can you prevent high blood pressure? · Stay at a healthy weight. · Try to limit how much sodium you eat to less than 2,300 milligrams (mg) a day. If you limit your sodium to 1,500 mg a day, you can lower your blood pressure even more. ¨ Buy foods that are labeled \"unsalted,\" \"sodium-free,\" or \"low-sodium. \" Foods labeled \"reduced-sodium\" and \"light sodium\" may still have too much sodium. ¨ Flavor your food with garlic, lemon juice, onion, vinegar, herbs, and spices instead of salt. Do not use soy sauce, steak sauce, onion salt, garlic salt, mustard, or ketchup on your food. ¨ Use less salt (or none) when recipes call for it. You can often use half the salt a recipe calls for without losing flavor. · Be physically active. Get at least 30 minutes of exercise on most days of the week. Walking is a good choice. You also may want to do other activities, such as running, swimming, cycling, or playing tennis or team sports. · Limit alcohol to 2 drinks a day for men and 1 drink a day for women. · Eat plenty of fruits, vegetables, and low-fat dairy products. Eat less saturated and total fats. How is high blood pressure treated? · Your doctor will suggest making lifestyle changes. For example, your doctor may ask you to eat healthy foods, quit smoking, lose extra weight, and be more active. · If lifestyle changes don't help enough or your blood pressure is very high, you will have to take medicine every day. Follow-up care is a key part of your treatment and safety. Be sure to make and go to all appointments, and call your doctor if you are having problems. It's also a good idea to know your test results and keep a list of the medicines you take. Where can you learn more? Go to http://roly-sheela.info/.   Enter P501 in the search box to learn more about \"Learning About High Blood Pressure. \"  Current as of: March 23, 2016  Content Version: 11.3  © 7618-2872 [employer], [facility]. Care instructions adapted under license by [employer] (which disclaims liability or warranty for this information). If you have questions about a medical condition or this instruction, always ask your healthcare professional. [place] [address] any warranty or liability for your use of this information.

## 2017-08-15 NOTE — ED NOTES
Discharge instructions given to pt. All questions answered and pt verbalized understanding. V/S stable @ time of discharge. Pt remains drowsy, Pt resting in room, will reevaluate status at 1630 hours.

## 2017-09-26 ENCOUNTER — TELEPHONE (OUTPATIENT)
Dept: NEUROLOGY | Age: 42
End: 2017-09-26

## 2017-09-26 NOTE — TELEPHONE ENCOUNTER
Informed patient that nortriptyline can cause drowsiness or dizziness. So be mindful of that.  Patient understood

## 2017-09-26 NOTE — TELEPHONE ENCOUNTER
Pt calling to check and see if she can take her medication in the morning instead of the evening. Pt requesting a returned phone call to clarify. Pt would like nurse to leave a message if she does not answer.

## 2017-10-13 ENCOUNTER — TELEPHONE (OUTPATIENT)
Dept: NEUROLOGY | Age: 42
End: 2017-10-13

## 2017-10-13 ENCOUNTER — OFFICE VISIT (OUTPATIENT)
Dept: NEUROLOGY | Age: 42
End: 2017-10-13

## 2017-10-13 VITALS
DIASTOLIC BLOOD PRESSURE: 70 MMHG | HEIGHT: 64 IN | BODY MASS INDEX: 28.89 KG/M2 | OXYGEN SATURATION: 98 % | WEIGHT: 169.2 LBS | RESPIRATION RATE: 16 BRPM | HEART RATE: 86 BPM | SYSTOLIC BLOOD PRESSURE: 110 MMHG

## 2017-10-13 DIAGNOSIS — G43.109 MIGRAINE WITH VERTIGO: ICD-10-CM

## 2017-10-13 DIAGNOSIS — G43.709 CHRONIC MIGRAINE W/O AURA W/O STATUS MIGRAINOSUS, NOT INTRACTABLE: Primary | ICD-10-CM

## 2017-10-13 RX ORDER — SUMATRIPTAN 50 MG/1
50 TABLET, FILM COATED ORAL
Qty: 9 TAB | Refills: 3 | Status: SHIPPED | OUTPATIENT
Start: 2017-10-13 | End: 2018-06-25 | Stop reason: SDUPTHER

## 2017-10-13 RX ORDER — ZOLMITRIPTAN 2.5 MG/1
1 SPRAY, METERED NASAL
Qty: 4 EACH | Refills: 3 | Status: SHIPPED | OUTPATIENT
Start: 2017-10-13 | End: 2019-12-09

## 2017-10-13 RX ORDER — NORTRIPTYLINE HYDROCHLORIDE 10 MG/1
CAPSULE ORAL
Qty: 120 CAP | Refills: 2 | Status: SHIPPED | OUTPATIENT
Start: 2017-10-13 | End: 2018-01-15 | Stop reason: SINTOL

## 2017-10-13 RX ORDER — ONDANSETRON 4 MG/1
4 TABLET, ORALLY DISINTEGRATING ORAL
Qty: 20 TAB | Refills: 0 | Status: SHIPPED | OUTPATIENT
Start: 2017-10-13 | End: 2018-11-21

## 2017-10-13 RX ORDER — IBUPROFEN 800 MG/1
TABLET ORAL
Refills: 0 | COMMUNITY
Start: 2017-08-29 | End: 2018-05-24

## 2017-10-13 NOTE — MR AVS SNAPSHOT
Visit Information Date & Time Provider Department Dept. Phone Encounter #  
 10/13/2017  8:40 AM DO [facility] at [address] [id number] Follow-up Instructions Return in about 3 months (around 1/13/2018). Routing History Upcoming Health Maintenance Date Due DTaP/Tdap/Td series (1 - Tdap) 10/9/1994 PAP AKA CERVICAL CYTOLOGY 10/9/1994 INFLUENZA AGE 9 TO ADULT 8/1/2017 Allergies as of 10/13/2017  Review Complete On: 10/13/2017 By: Isela Whitfield LPN Severity Noted Reaction Type Reactions Metronidazole  12/22/2015    Rash Stadol [Butorphanol Tartrate]  12/22/2015    Palpitations Current Immunizations  Never Reviewed No immunizations on file. Not reviewed this visit You Were Diagnosed With   
  
 Codes Comments Chronic migraine w/o aura w/o status migrainosus, not intractable    -  Primary ICD-10-CM: O35.837 ICD-9-CM: 346.70 Migraine with vertigo     ICD-10-CM: G43.109 ICD-9-CM: 346.00 Vitals BP Pulse Resp Height(growth percentile) Weight(growth percentile) SpO2  
 110/70 86 16 5' 4\" (1.626 m) 169 lb 3.2 oz (76.7 kg) 98% BMI OB Status Smoking Status 29.04 kg/m2 Having regular periods Never Smoker Vitals History BMI and BSA Data Body Mass Index Body Surface Area 29.04 kg/m 2 1.86 m 2 Preferred Pharmacy Pharmacy Name Phone [id number]i Vences, [address] [place] [address] [phone] Your Updated Medication List  
  
   
This list is accurate as of: 10/13/17  8:52 AM.  Always use your most recent med list.  
  
  
  
  
 [employer] Cap Generic drug:  herbal drugs Take 2 Caps by mouth daily. hydroCHLOROthiazide 12.5 mg capsule Commonly known as:  Darnella Claire TAKE ONE CAPSULE BY MOUTH EVERY DAY  
  
 ibuprofen 800 mg tablet Commonly known as:  MOTRIN  
 TAKE 1 TABLET BY MOUTH EVERY 8 HOURS AS NEEDED FOR PAIN  
  
 multivitamin tablet Commonly known as:  ONE A DAY Take 1 Tab by mouth daily. Indications: VITAMIN DEFICIENCY PREVENTION  
  
 nortriptyline 10 mg capsule Commonly known as:  PAMELOR  
4 capsules at bedtime  
  
 omeprazole 20 mg capsule Commonly known as:  PRILOSEC Take 20 mg by mouth daily. Indications: GASTROESOPHAGEAL REFLUX  
  
 ondansetron 4 mg disintegrating tablet Commonly known as:  ZOFRAN ODT Take 1 Tab by mouth every eight (8) hours as needed for Nausea. oxyCODONE-acetaminophen 5-325 mg per tablet Commonly known as:  PERCOCET Take 1-2 Tabs by mouth every four (4) hours as needed for Pain. Max Daily Amount: 12 Tabs. SUMAtriptan 50 mg tablet Commonly known as:  IMITREX Take 1 Tab by mouth once as needed for Migraine for up to 1 dose. ZOLMitriptan 2.5 mg Spry Commonly known as:  ZOMIG  
1 Spray by Nasal route once as needed for up to 1 dose. Prescriptions Sent to Pharmacy Refills  
 nortriptyline (PAMELOR) 10 mg capsule 2 Si capsules at bedtime Class: Normal  
 Pharmacy: [facility] #[id number] [address] Ph #: [phone]  
 ondansetron (ZOFRAN ODT) 4 mg disintegrating tablet 0 Sig: Take 1 Tab by mouth every eight (8) hours as needed for Nausea. Class: Normal  
 Pharmacy: [facility]/pharmacy [address] [place] [address] Ph #: [phone] Route: Oral  
 ZOLMitriptan (ZOMIG) 2.5 mg spry 3 Si Spray by Nasal route once as needed for up to 1 dose. Class: Normal  
 Pharmacy: [facility]/pharmacy [address] [place] [address] Ph #: [phone] Route: Nasal  
 SUMAtriptan (IMITREX) 50 mg tablet 3 Sig: Take 1 Tab by mouth once as needed for Migraine for up to 1 dose.   
 Class: Normal  
 Pharmacy: [facility]/pharmacy [facility], [facility] #: [phone] Route: Oral  
  
Follow-up Instructions Return in about 3 months (around 1/13/2018). Patient Instructions When a severe headache begins please take Zomig and Zofran immediately. This may stop the headache completely. If that fails then you can take a tablet of Imitrex (sumatriptan). Please start taking nortriptyline 3 capsules at bedtime for week and increase to 4 capsules after. Introducing [facility]! Letty Warren introduces [employer] patient portal. Now you can access parts of your medical record, email your doctor's office, and request medication refills online. 1. In your internet browser, go to [email or web address] 2. Click on the First Time User? Click Here link in the Sign In box. You will see the New Member Sign Up page. 3. Enter your [employer] Access Code exactly as it appears below. You will not need to use this code after youve completed the sign-up process. If you do not sign up before the expiration date, you must request a new code. · [employer] Access Code: [email or web address] Expires: 1/11/2018  8:35 AM 
 
4. Enter the last four digits of your Social Security Number (xxxx) and Date of Birth (mm/dd/yyyy) as indicated and click Submit. You will be taken to the next sign-up page. 5. Create a [employer] ID. This will be your [employer] login ID and cannot be changed, so think of one that is secure and easy to remember. 6. Create a [employer] password. You can change your password at any time. 7. Enter your Password Reset Question and Answer. This can be used at a later time if you forget your password. 8. Enter your e-mail address. You will receive e-mail notification when new information is available in [address] 9. Click Sign Up. You can now view and download portions of your medical record. 10. Click the Download Summary menu link to download a portable copy of your medical information. If you have questions, please visit the Frequently Asked Questions section of the [employer] website. Remember, [employer] is NOT to be used for urgent needs. For medical emergencies, dial 911. Now available from your iPhone and Android! Please provide this summary of care documentation to your next provider. Your primary care clinician is listed as Lynn Last. If you have any questions after today's visit, please call [phone].

## 2017-10-13 NOTE — PATIENT INSTRUCTIONS
When a severe headache begins please take Zomig and Zofran immediately. This may stop the headache completely. If that fails then you can take a tablet of Imitrex (sumatriptan). Please start taking nortriptyline 3 capsules at bedtime for week and increase to 4 capsules after.

## 2017-10-13 NOTE — PROGRESS NOTES
Chief Complaint   Patient presents with    Headache     f/u       HPI    Elijah Richey is a 49-year-old woman here to follow-up on chronic migraines. I saw her in June of this year. Since that time she has had sinus surgery with some improvement in the sinus symptoms. She still having very frequent headaches and fortunately. She tells me that she has been inconsistent with medication and sometimes she forgets it. Sleep is still very challenging for her. Still using Percocet at times acutely. Zomig was helpful and did not cause sedation. Sumatriptan is her \"last resort\" medication because it makes her sleepy. Review of Systems   Eyes: Positive for photophobia. Gastrointestinal: Positive for nausea. Neurological: Positive for headaches. Psychiatric/Behavioral: The patient has insomnia. All other systems reviewed and are negative. Past Medical History:   Diagnosis Date    Dense breasts     GERD (gastroesophageal reflux disease)     Headache     Hypertension     Ill-defined condition     heart murmer    Nausea & vomiting     Sinus congestion      Family History   Problem Relation Age of Onset    Breast Cancer Mother 79     Social History     Social History    Marital status: SINGLE     Spouse name: N/A    Number of children: N/A    Years of education: N/A     Occupational History    Not on file.      Social History Main Topics    Smoking status: Never Smoker    Smokeless tobacco: Never Used    Alcohol use 0.6 oz/week     0 Standard drinks or equivalent, 1 Shots of liquor per week    Drug use: No    Sexual activity: Not on file     Other Topics Concern    Not on file     Social History Narrative     Allergies   Allergen Reactions    Metronidazole Rash    Stadol [Butorphanol Tartrate] Palpitations         Current Outpatient Prescriptions   Medication Sig    nortriptyline (PAMELOR) 10 mg capsule 4 capsules at bedtime    ondansetron (ZOFRAN ODT) 4 mg disintegrating tablet Take 1 Tab by mouth every eight (8) hours as needed for Nausea.  ZOLMitriptan (ZOMIG) 2.5 mg spry 1 Spray by Nasal route once as needed for up to 1 dose.  SUMAtriptan (IMITREX) 50 mg tablet Take 1 Tab by mouth once as needed for Migraine for up to 1 dose.  hydroCHLOROthiazide (MICROZIDE) 12.5 mg capsule TAKE ONE CAPSULE BY MOUTH EVERY DAY    oxyCODONE-acetaminophen (PERCOCET) 5-325 mg per tablet Take 1-2 Tabs by mouth every four (4) hours as needed for Pain. Max Daily Amount: 12 Tabs.  herbal drugs (COLON HERBAL CLEANSER) cap Take 2 Caps by mouth daily.  omeprazole (PRILOSEC) 20 mg capsule Take 20 mg by mouth daily. Indications: GASTROESOPHAGEAL REFLUX    multivitamin (ONE A DAY) tablet Take 1 Tab by mouth daily. Indications: VITAMIN DEFICIENCY PREVENTION    ibuprofen (MOTRIN) 800 mg tablet TAKE 1 TABLET BY MOUTH EVERY 8 HOURS AS NEEDED FOR PAIN     No current facility-administered medications for this visit. Neurologic Exam     Mental Status        WD/WN adult in NAD, normal grooming  VSS  A&O x 3    PERRL, nonicteric  Face is symmetric, tongue midline  Speech is fluent and clear  No limb ataxia. No abnl movements. Moving all extemities spontaneously and symmetric  Normal gait    CVS RRR  Lungs nonlabored  Skin is warm and dry         Visit Vitals    /70    Pulse 86    Resp 16    Ht 5' 4\" (1.626 m)    Wt 76.7 kg (169 lb 3.2 oz)    SpO2 98%    BMI 29.04 kg/m2       Assessment and Plan   Diagnoses and all orders for this visit:    1. Chronic migraine w/o aura w/o status migrainosus, not intractable    2. Migraine with vertigo    Other orders  -     nortriptyline (PAMELOR) 10 mg capsule; 4 capsules at bedtime  -     ondansetron (ZOFRAN ODT) 4 mg disintegrating tablet; Take 1 Tab by mouth every eight (8) hours as needed for Nausea. -     ZOLMitriptan (ZOMIG) 2.5 mg spry; 1 Spray by Nasal route once as needed for up to 1 dose. -     SUMAtriptan (IMITREX) 50 mg tablet;  Take 1 Tab by mouth once as needed for Migraine for up to 1 dose. 40-year-old woman with chronic migraines and vertiginous symptoms likely migrainous in origin. I recommend that she give a good effort to challenge her headaches with medication and be consistent if possible. I am going to increase nortriptyline to 40 mg at bedtime. Continue Zomig and Zofran as the initial choice for acute headache management followed by sumatriptan as her rescue medication if necessary. Sumatriptan causes sedation so she cannot take it immediately. Stop the Percocet. High risk for rebound. I will see her in 3 months.     2 [facility], [address]  Diplomate ABPN

## 2018-01-15 ENCOUNTER — OFFICE VISIT (OUTPATIENT)
Dept: NEUROLOGY | Age: 43
End: 2018-01-15

## 2018-01-15 VITALS
BODY MASS INDEX: 28.68 KG/M2 | DIASTOLIC BLOOD PRESSURE: 74 MMHG | SYSTOLIC BLOOD PRESSURE: 127 MMHG | OXYGEN SATURATION: 100 % | HEART RATE: 61 BPM | RESPIRATION RATE: 18 BRPM | HEIGHT: 64 IN | WEIGHT: 168 LBS

## 2018-01-15 DIAGNOSIS — G43.009 MIGRAINE WITHOUT AURA AND WITHOUT STATUS MIGRAINOSUS, NOT INTRACTABLE: Primary | ICD-10-CM

## 2018-01-15 NOTE — PROGRESS NOTES
Chief Complaint   Patient presents with    Headache       HPI    Ms. Anita Romero is a 75-year-old woman here to follow-up on chronic migraine. Since our last visit the headaches have reduced in frequency on their own. She was unable to continue taking nortriptyline due to dizziness. She is now having about 2 or 3 headaches per month requiring acute therapy which is an improvement. She discontinue nortriptyline due to side effects. Headaches when they occur are still diffuse and throbbing with light sensitivity and nausea. Zomig and sumatriptan oral have been helpful. Review of Systems   Eyes: Positive for photophobia. Neurological: Positive for headaches. All other systems reviewed and are negative. Past Medical History:   Diagnosis Date    Dense breasts     GERD (gastroesophageal reflux disease)     Headache     Hypertension     Ill-defined condition     heart murmer    Nausea & vomiting     Sinus congestion      Family History   Problem Relation Age of Onset    Breast Cancer Mother 79     Social History     Social History    Marital status: SINGLE     Spouse name: N/A    Number of children: N/A    Years of education: N/A     Occupational History    Not on file. Social History Main Topics    Smoking status: Never Smoker    Smokeless tobacco: Never Used    Alcohol use 0.6 oz/week     0 Standard drinks or equivalent, 1 Shots of liquor per week    Drug use: No    Sexual activity: Not on file     Other Topics Concern    Not on file     Social History Narrative     Allergies   Allergen Reactions    Metronidazole Rash    Stadol [Butorphanol Tartrate] Palpitations         Current Outpatient Prescriptions   Medication Sig    ibuprofen (MOTRIN) 800 mg tablet TAKE 1 TABLET BY MOUTH EVERY 8 HOURS AS NEEDED FOR PAIN    ondansetron (ZOFRAN ODT) 4 mg disintegrating tablet Take 1 Tab by mouth every eight (8) hours as needed for Nausea.     ZOLMitriptan (ZOMIG) 2.5 mg spry 1 Spray by Nasal route once as needed for up to 1 dose.  SUMAtriptan (IMITREX) 50 mg tablet Take 1 Tab by mouth once as needed for Migraine for up to 1 dose.  hydroCHLOROthiazide (MICROZIDE) 12.5 mg capsule TAKE ONE CAPSULE BY MOUTH EVERY DAY    oxyCODONE-acetaminophen (PERCOCET) 5-325 mg per tablet Take 1-2 Tabs by mouth every four (4) hours as needed for Pain. Max Daily Amount: 12 Tabs.  herbal drugs (COLON HERBAL CLEANSER) cap Take 2 Caps by mouth daily.  omeprazole (PRILOSEC) 20 mg capsule Take 20 mg by mouth daily. Indications: GASTROESOPHAGEAL REFLUX    multivitamin (ONE A DAY) tablet Take 1 Tab by mouth daily. Indications: VITAMIN DEFICIENCY PREVENTION     No current facility-administered medications for this visit. Neurologic Exam     Mental Status        WD/WN adult in NAD, normal grooming  VSS  A&O x 3    PERRL, nonicteric  Face is symmetric, tongue midline  Speech is fluent and clear  No limb ataxia. No abnl movements. Moving all extemities spontaneously and symmetric  Normal gait    CVS RRR  Lungs nonlabored  Skin is warm and dry         Visit Vitals    /74 (BP 1 Location: Left arm, BP Patient Position: Sitting)    Pulse 61    Resp 18    Ht 5' 4\" (1.626 m)    Wt 76.2 kg (168 lb)    SpO2 100%    BMI 28.84 kg/m2       Assessment and Plan   Diagnoses and all orders for this visit:    1. Migraine without aura and without status migrainosus, not intractable      75-year-old woman with migraine headaches that respond to acute therapy appropriately. Headaches have reduced in frequency so we will defer daily preventative treatment. We should reassess in 6 months.       2 [facility], [address]  Diplomate ABPN

## 2018-01-15 NOTE — MR AVS SNAPSHOT
Visit Information Date & Time Provider Department Dept. Phone Encounter #  
 1/15/2018  8:40 AM Darryle Leopard Janene Lis, DO [facility] at [address] [id number] Follow-up Instructions Return in about 6 months (around 7/15/2018). Routing History Upcoming Health Maintenance Date Due DTaP/Tdap/Td series (1 - Tdap) 10/9/1994 PAP AKA CERVICAL CYTOLOGY 10/9/1994 Influenza Age 5 to Adult 8/1/2017 Allergies as of 1/15/2018  Review Complete On: 10/13/2017 By: [facility], DO Severity Noted Reaction Type Reactions Metronidazole  12/22/2015    Rash Stadol [Butorphanol Tartrate]  12/22/2015    Palpitations Current Immunizations  Never Reviewed No immunizations on file. Not reviewed this visit You Were Diagnosed With   
  
 Codes Comments Migraine without aura and without status migrainosus, not intractable    -  Primary ICD-10-CM: G43.009 ICD-9-CM: 346.10 Vitals BP Pulse Resp Height(growth percentile) Weight(growth percentile) SpO2  
 127/74 (BP 1 Location: Left arm, BP Patient Position: Sitting) 61 18 5' 4\" (1.626 m) 168 lb (76.2 kg) 100% BMI OB Status Smoking Status 28.84 kg/m2 Having regular periods Never Smoker BMI and BSA Data Body Mass Index Body Surface Area  
 28.84 kg/m 2 1.85 m 2 Preferred Pharmacy Pharmacy Name Phone [id number] Andres Krishnan, [address] [place] [address] [phone] Your Updated Medication List  
  
   
This list is accurate as of: 1/15/18  9:06 AM.  Always use your most recent med list.  
  
  
  
  
 COLON HERBAL CLEANSER Cap Generic drug:  herbal drugs Take 2 Caps by mouth daily. hydroCHLOROthiazide 12.5 mg capsule Commonly known as:  Deneice Sarks TAKE ONE CAPSULE BY MOUTH EVERY DAY  
  
 ibuprofen 800 mg tablet Commonly known as:  MOTRIN  
 TAKE 1 TABLET BY MOUTH EVERY 8 HOURS AS NEEDED FOR PAIN  
  
 multivitamin tablet Commonly known as:  ONE A DAY Take 1 Tab by mouth daily. Indications: VITAMIN DEFICIENCY PREVENTION  
  
 omeprazole 20 mg capsule Commonly known as:  PRILOSEC Take 20 mg by mouth daily. Indications: GASTROESOPHAGEAL REFLUX  
  
 ondansetron 4 mg disintegrating tablet Commonly known as:  ZOFRAN ODT Take 1 Tab by mouth every eight (8) hours as needed for Nausea. oxyCODONE-acetaminophen 5-325 mg per tablet Commonly known as:  PERCOCET Take 1-2 Tabs by mouth every four (4) hours as needed for Pain. Max Daily Amount: 12 Tabs. SUMAtriptan 50 mg tablet Commonly known as:  IMITREX Take 1 Tab by mouth once as needed for Migraine for up to 1 dose. ZOLMitriptan 2.5 mg Spry Commonly known as:  ZOMIG  
1 Spray by Nasal route once as needed for up to 1 dose. Follow-up Instructions Return in about 6 months (around 7/15/2018). Patient Instructions A Healthy Lifestyle: Care Instructions Your Care Instructions A healthy lifestyle can help you feel good, stay at a healthy weight, and have plenty of energy for both work and play. A healthy lifestyle is something you can share with your whole family. A healthy lifestyle also can lower your risk for serious health problems, such as high blood pressure, heart disease, and diabetes. You can follow a few steps listed below to improve your health and the health of your family. Follow-up care is a key part of your treatment and safety. Be sure to make and go to all appointments, and call your doctor if you are having problems. It's also a good idea to know your test results and keep a list of the medicines you take. How can you care for yourself at home? · Do not eat too much sugar, fat, or fast foods. You can still have dessert and treats now and then. The goal is moderation. · Start small to improve your eating habits. Pay attention to portion sizes, drink less juice and soda pop, and eat more fruits and vegetables. ¨ Eat a healthy amount of food. A 3-ounce serving of meat, for example, is about the size of a deck of cards. Fill the rest of your plate with vegetables and whole grains. ¨ Limit the amount of soda and sports drinks you have every day. Drink more water when you are thirsty. ¨ Eat at least 5 servings of fruits and vegetables every day. It may seem like a lot, but it is not hard to reach this goal. A serving or helping is 1 piece of fruit, 1 cup of vegetables, or 2 cups of leafy, raw vegetables. Have an apple or some carrot sticks as an afternoon snack instead of a candy bar. Try to have fruits and/or vegetables at every meal. 
· Make exercise part of your daily routine. You may want to start with simple activities, such as walking, bicycling, or slow swimming. Try to be active 30 to 60 minutes every day. You do not need to do all 30 to 60 minutes all at once. For example, you can exercise 3 times a day for 10 or 20 minutes. Moderate exercise is safe for most people, but it is always a good idea to talk to your doctor before starting an exercise program. 
· Keep moving. Eliane Kristianrandi the lawn, work in the garden, or MMIS. Take the stairs instead of the elevator at work. · If you smoke, quit. People who smoke have an increased risk for heart attack, stroke, cancer, and other lung illnesses. Quitting is hard, but there are ways to boost your chance of quitting tobacco for good. ¨ Use nicotine gum, patches, or lozenges. ¨ Ask your doctor about stop-smoking programs and medicines. ¨ Keep trying. In addition to reducing your risk of diseases in the future, you will notice some benefits soon after you stop using tobacco. If you have shortness of breath or asthma symptoms, they will likely get better within a few weeks after you quit. · Limit how much alcohol you drink. Moderate amounts of alcohol (up to 2 drinks a day for men, 1 drink a day for women) are okay. But drinking too much can lead to liver problems, high blood pressure, and other health problems. Family health If you have a family, there are many things you can do together to improve your health. · Eat meals together as a family as often as possible. · Eat healthy foods. This includes fruits, vegetables, lean meats and dairy, and whole grains. · Include your family in your fitness plan. Most people think of activities such as jogging or tennis as the way to fitness, but there are many ways you and your family can be more active. Anything that makes you breathe hard and gets your heart pumping is exercise. Here are some tips: 
¨ Walk to do errands or to take your child to school or the bus. ¨ Go for a family bike ride after dinner instead of watching TV. Where can you learn more? Go to http://roly-sheela.info/. Enter T578 in the search box to learn more about \"A Healthy Lifestyle: Care Instructions. \" Current as of: May 12, 2017 Content Version: 11.4 © 9493-8072 [employer]. Care instructions adapted under license by [employer] (which disclaims liability or warranty for this information). If you have questions about a medical condition or this instruction, always ask your healthcare professional. [address] any warranty or liability for your use of this information. Introducing [facility]! Rose Borrero introduces [email or web address] patient portal. Now you can access parts of your medical record, email your doctor's office, and request medication refills online. 1. In your internet browser, go to [email or web address] 2. Click on the First Time User? Click Here link in the Sign In box. You will see the New Member Sign Up page. 3. Enter your M-Audio Access Code exactly as it appears below. You will not need to use this code after youve completed the sign-up process. If you do not sign up before the expiration date, you must request a new code. · M-Audio Access Code: [email or web address] Expires: 4/15/2018  8:50 AM 
 
4. Enter the last four digits of your Social Security Number (xxxx) and Date of Birth (mm/dd/yyyy) as indicated and click Submit. You will be taken to the next sign-up page. 5. Create a M-Audio ID. This will be your M-Audio login ID and cannot be changed, so think of one that is secure and easy to remember. 6. Create a M-Audio password. You can change your password at any time. 7. Enter your Password Reset Question and Answer. This can be used at a later time if you forget your password. 8. Enter your e-mail address. You will receive e-mail notification when new information is available in [address]. 9. Click Sign Up. You can now view and download portions of your medical record. 10. Click the Download Summary menu link to download a portable copy of your medical information. If you have questions, please visit the Frequently Asked Questions section of the M-Audio website. Remember, M-Audio is NOT to be used for urgent needs. For medical emergencies, dial 911. Now available from your iPhone and Android! Please provide this summary of care documentation to your next provider. Your primary care clinician is listed as Lord Jiménez. If you have any questions after today's visit, please call [phone].

## 2018-01-15 NOTE — PATIENT INSTRUCTIONS

## 2018-03-13 ENCOUNTER — HOSPITAL ENCOUNTER (OUTPATIENT)
Dept: MAMMOGRAPHY | Age: 43
Discharge: HOME | End: 2018-03-13
Attending: INTERNAL MEDICINE
Payer: COMMERCIAL

## 2018-03-13 DIAGNOSIS — Z12.39 BREAST SCREENING: ICD-10-CM

## 2018-03-13 PROCEDURE — 77063 BREAST TOMOSYNTHESIS BI: CPT

## 2018-05-24 ENCOUNTER — OFFICE VISIT (OUTPATIENT)
Dept: INTERNAL MEDICINE CLINIC | Age: 43
End: 2018-05-24

## 2018-05-24 VITALS
OXYGEN SATURATION: 98 % | RESPIRATION RATE: 16 BRPM | DIASTOLIC BLOOD PRESSURE: 84 MMHG | WEIGHT: 173.2 LBS | TEMPERATURE: 98.8 F | HEIGHT: 64 IN | SYSTOLIC BLOOD PRESSURE: 124 MMHG | HEART RATE: 76 BPM | BODY MASS INDEX: 29.57 KG/M2

## 2018-05-24 DIAGNOSIS — I10 ESSENTIAL HYPERTENSION: Primary | ICD-10-CM

## 2018-05-24 DIAGNOSIS — R63.5 WEIGHT GAIN: ICD-10-CM

## 2018-05-24 DIAGNOSIS — R92.2 DENSE BREASTS: ICD-10-CM

## 2018-05-24 DIAGNOSIS — G43.809 OTHER MIGRAINE WITHOUT STATUS MIGRAINOSUS, NOT INTRACTABLE: ICD-10-CM

## 2018-05-24 DIAGNOSIS — K21.9 GASTROESOPHAGEAL REFLUX DISEASE, ESOPHAGITIS PRESENCE NOT SPECIFIED: ICD-10-CM

## 2018-05-24 DIAGNOSIS — Z00.00 ROUTINE GENERAL MEDICAL EXAMINATION AT A HEALTH CARE FACILITY: ICD-10-CM

## 2018-05-24 NOTE — PROGRESS NOTES
Chief Complaint   Patient presents with   [address] [place] [address]     1. Have you been to the ER, urgent care clinic since your last visit? Hospitalized since your last visit? No    2. Have you seen or consulted any other health care providers outside of the [address] [place] [address] since your last visit? Include any pap smears or colon screening.  Yes Where: [address] [place] [address] Reason for visit: annual

## 2018-05-24 NOTE — PROGRESS NOTES
HISTORY OF PRESENT ILLNESS  Dmitri Smiley is a 38 y.o. female. HPI  New Patient  Dmitri Smiley is a new patient. Prior care:  She has not seen her  PCP. Has been seeing her gynecologist for primary care. Her gynecologist is Howie. Records have been requested. ER Visits/ Hospitalizations:     Health Maintenance  Breast Cancer screening: Up to date, completed by gynecologist.  2018 utd ; h/o cyst   Colorectal Cancer screening: no fhx; +chronic constipation   Cervical Cancer screening: Up to date, completed by gynecologist.        Cardiovascular Review  The patient has of HTN and  Body mass index is 29.94 kg/(m^2). concerned about her weight. Diet and Lifestyle: exercises regularly, nonsmoker, walks in AM.  Not monitoring her food   Home BP Monitoring: is not measured at home. Pertinent ROS: taking medications as instructed, no medication side effects noted, no TIA's, no chest pain on exertion, no dyspnea on exertion, no swelling of ankles. Migraines  Followed by Dr. West Mas. Records reviewed. Control has improved. Zomig and Sumatriptan prn. GERD  Patient complains of gastroesophageal reflux with heartburn. Symptoms have been present for several years. She denies dysphagia. She  has not lost weight. She denies melena, hematochezia, hematemesis, and coffee ground emesis. This has been associated with difficulty swallowing and heartburn. She denies dysphagia and early satiety. Medical therapy in the past has included Prilosec. SHx: [employer], [employer] grad- 15 & 20; 2005 A [facility]   Review of Systems   Constitutional: Negative for diaphoresis, fever and weight loss. Eyes: Negative for blurred vision and pain. Respiratory: Negative for shortness of breath. Cardiovascular: Negative for chest pain, orthopnea and leg swelling. Gastrointestinal: Positive for constipation and heartburn.    Musculoskeletal:        Left shoulder pain Neurological: Negative for focal weakness and headaches. Psychiatric/Behavioral: Negative for depression. Patient Active Problem List    Diagnosis Date Noted    DDD (degenerative disc disease), cervical 10/18/2016    Hypertension     Headache     Arrhythmia     Dense breasts     Sinus congestion        Current Outpatient Prescriptions   Medication Sig Dispense Refill    hydroCHLOROthiazide (MICROZIDE) 12.5 mg capsule TAKE ONE CAPSULE BY MOUTH EVERY DAY 90 Cap 11    ondansetron (ZOFRAN ODT) 4 mg disintegrating tablet Take 1 Tab by mouth every eight (8) hours as needed for Nausea. 20 Tab 0    ZOLMitriptan (ZOMIG) 2.5 mg spry 1 Spray by Nasal route once as needed for up to 1 dose. 4 Each 3    SUMAtriptan (IMITREX) 50 mg tablet Take 1 Tab by mouth once as needed for Migraine for up to 1 dose. 9 Tab 3    oxyCODONE-acetaminophen (PERCOCET) 5-325 mg per tablet Take 1-2 Tabs by mouth every four (4) hours as needed for Pain. Max Daily Amount: 12 Tabs. 90 Tab 0    herbal drugs (COLON HERBAL CLEANSER) cap Take 2 Caps by mouth daily.  omeprazole (PRILOSEC) 20 mg capsule Take 20 mg by mouth daily. Indications: GASTROESOPHAGEAL REFLUX      multivitamin (ONE A DAY) tablet Take 1 Tab by mouth daily. Indications: VITAMIN DEFICIENCY PREVENTION         Allergies   Allergen Reactions    Metronidazole Rash    Stadol [Butorphanol Tartrate] Palpitations      Visit Vitals    /84 (BP 1 Location: Right arm, BP Patient Position: Sitting)    Pulse 76    Temp 98.8 °F (37.1 °C) (Oral)    Resp 16    Ht 5' 3.78\" (1.62 m)    Wt 173 lb 3.2 oz (78.6 kg)    SpO2 98%    BMI 29.94 kg/m2         Physical Exam   Constitutional: She is oriented to person, place, and time. She appears well-developed. No distress. Eyes: Conjunctivae are normal.   Neck: Neck supple. No thyromegaly present. Cardiovascular: Normal rate, regular rhythm and normal heart sounds.     Pulmonary/Chest: Effort normal and breath sounds normal. No respiratory distress. She has no wheezes. She has no rales. She exhibits no tenderness. Lymphadenopathy:     She has no cervical adenopathy. Neurological: She is alert and oriented to person, place, and time. Skin: Skin is warm. Psychiatric: She has a normal mood and affect. ASSESSMENT and PLAN  Diagnoses and all orders for this visit:    1. Essential hypertension- well controlled. Counseled on diet and exercise. Continue current regimen. 2. Weight gain- has classic s/s of a busy lifestyle. Will work on sleep & stress optimization. She would benefit from trial of ACAC prep program for guidance on her weight loss program. Will check labs for metabolic causes. Start carb reduction. I have reviewed/discussed the above normal BMI with the patient. I have recommended the following interventions: dietary management education, guidance, and counseling . Lluvia Onofre 3. BMI 29.0-29.9,adult- see above     4. Other migraine without status migrainosus, not intractable- per neurology     5. Dense breasts- mammogram UTD. Obtain records     6. Gastroesophageal reflux disease, esophagitis presence not specified- obtain GI records for review need to ensure H pylori and esophagitis have been evaluation. Would benefit from dietary optimization which she is reluctant to change. Continue prilosec. 7. Routine general medical examination at a health care facility-  labs ordered. -     CBC WITH AUTOMATED DIFF  -     LIPID PANEL  -     METABOLIC PANEL, COMPREHENSIVE  -     THYROID PANEL  -     TSH 3RD GENERATION  -     VITAMIN D, 25 HYDROXY  -     HEMOGLOBIN A1C WITH EAG      Follow-up Disposition:  Return in about 4 months (around 9/24/2018) for Physical - 30 minute appointment. Medication risks/benefits/costs/interactions/alternatives discussed with patient. Russ Luque  was given an after visit summary which includes diagnoses, current medications, & vitals.   she expressed understanding with the diagnosis and plan.

## 2018-05-24 NOTE — MR AVS SNAPSHOT
[address] [facility] [address] [facility] 
[phone] Patient: Weston Leo MRN: [id number] :1975 Visit Information Date & Time Provider Department Dept. Phone Encounter #  
 2018  2:00 PM Chacorta Velazquez MD [facility] Internal Medicine [phone] Follow-up Instructions Return in about 4 months (around 2018) for Physical - 30 minute appointment. Your Appointments 2018  8:20 AM  
Follow Up with 812 [facility] Neurology Clinic at [address] [facility] Appt Note: 6 m f/u cs 1/15/18 302 [facility] [address]  
[phone]  
  
   
 [address]  [address] Upcoming Health Maintenance Date Due DTaP/Tdap/Td series (1 - Tdap) 10/9/1994 PAP AKA CERVICAL CYTOLOGY 10/9/1994 Influenza Age 5 to Adult 2018 BREAST CANCER SCRN MAMMOGRAM 3/13/2019 Allergies as of 2018  Review Complete On: 2018 By: Chacorta Velazquez MD  
  
 Severity Noted Reaction Type Reactions Metronidazole  2015    Rash Stadol [Butorphanol Tartrate]  2015    Palpitations Current Immunizations  Never Reviewed No immunizations on file. Not reviewed this visit You Were Diagnosed With   
  
 Codes Comments Essential hypertension    -  Primary ICD-10-CM: I10 
ICD-9-CM: 401.9 Weight gain     ICD-10-CM: R63.5 ICD-9-CM: 783.1 BMI 29.0-29.9,adult     ICD-10-CM: B86.79 ICD-9-CM: V85.25 Other migraine without status migrainosus, not intractable     ICD-10-CM: U80.075 ICD-9-CM: 346.80 Dense breasts     ICD-10-CM: R92.2 ICD-9-CM: 793.82 Gastroesophageal reflux disease, esophagitis presence not specified     ICD-10-CM: K21.9 ICD-9-CM: 530.81 Routine general medical examination at a health care facility     ICD-10-CM: Z00.00 ICD-9-CM: V70.0 Vitals BP Pulse Temp Resp Height(growth percentile) Weight(growth percentile) 124/84 (BP 1 Location: Right arm, BP Patient Position: Sitting) 76 98.8 °F (37.1 °C) (Oral) 16 5' 3.78\" (1.62 m) 173 lb 3.2 oz (78.6 kg) LMP SpO2 BMI OB Status Smoking Status 05/11/2018 98% 29.94 kg/m2 Having regular periods Never Smoker Vitals History BMI and BSA Data Body Mass Index Body Surface Area  
 29.94 kg/m 2 1.88 m 2 Preferred Pharmacy Pharmacy Name Phone [id number] Beatriz Shine, [address] [facility] [phone] Your Updated Medication List  
  
   
This list is accurate as of 5/24/18  3:13 PM.  Always use your most recent med list.  
  
  
  
  
 COLON HERBAL CLEANSER Cap Generic drug:  herbal drugs Take 2 Caps by mouth daily. hydroCHLOROthiazide 12.5 mg capsule Commonly known as:  Laveta Slough TAKE ONE CAPSULE BY MOUTH EVERY DAY  
  
 multivitamin tablet Commonly known as:  ONE A DAY Take 1 Tab by mouth daily. Indications: VITAMIN DEFICIENCY PREVENTION  
  
 omeprazole 20 mg capsule Commonly known as:  PRILOSEC Take 20 mg by mouth daily. Indications: GASTROESOPHAGEAL REFLUX  
  
 ondansetron 4 mg disintegrating tablet Commonly known as:  ZOFRAN ODT Take 1 Tab by mouth every eight (8) hours as needed for Nausea. oxyCODONE-acetaminophen 5-325 mg per tablet Commonly known as:  PERCOCET Take 1-2 Tabs by mouth every four (4) hours as needed for Pain. Max Daily Amount: 12 Tabs. SUMAtriptan 50 mg tablet Commonly known as:  IMITREX Take 1 Tab by mouth once as needed for Migraine for up to 1 dose. ZOLMitriptan 2.5 mg Spry Commonly known as:  ZOMIG  
1 Spray by Nasal route once as needed for up to 1 dose. We Performed the Following CBC WITH AUTOMATED DIFF [95903 CPT(R)] HEMOGLOBIN A1C WITH EAG [32874 CPT(R)] LIPID PANEL [76360 CPT(R)] METABOLIC PANEL, COMPREHENSIVE [23314 CPT(R)] THYROID PANEL [id number] CPT(R)] TSH 3RD GENERATION [41803 CPT(R)] VITAMIN D, 25 HYDROXY [id number] CPT(R)] Follow-up Instructions Return in about 4 months (around 9/24/2018) for Physical - 30 minute appointment. Patient Instructions It was a pleasure to see you! As discussed: 
 
I have given you a referral to the [facility] SERVICES P.R.E.P (Physician Referred Exercise program). Please visit: [email or web address] for more information.  
 
-Decrease carbohydrates to 150-200g/ day. Health Maintenance I have ordered your age appropriate labs please complete them. You will need to fast 10-12hrs before your appointment. Start paying more attention to your diet and start exercising. Goal for exercise is 150minutes of moderate exercise a week and diet goal is to eat 50% fruits and vegetables with minimal sugar, fat and oil daily. See health.gov or NXEplate.gov for more details. Your cervical cancer and breast cancer screening will be completed by your ob/ gyn as scheduled. Learning About Carbohydrates What are carbohydrates? Carbohydrates are an important nutrient you get from food. It's a great source of energy for your body and helps your brain and nervous system work properly. How does your body use carbohydrates? After you eat food with carbs in it, your body digests the carbohydrates and turns them into a kind of sugar that goes into your blood. The blood carries this sugar to the cells in your body. The cells use the sugar to give you energy. Extra sugar is stored in the cells for later use. If it isn't used, it turns into fat. Where do carbohydrates come from? The healthiest carbohydrate choices are breads, cereals, and pastas made with whole grains; brown rice; low-fat dairy products; vegetables; legumes such as peas, lentils, and beans; and fruits.  
Foods made from refined flour, including bread, pasta, doughnuts, cookies, and desserts, also contain carbohydrates. So do sweets such as candy and soda. How can you get the right kind and amount of carbs? Eating too much of anything can lead to weight gain. And that can lead to other health problems. Here are some tips to help you eat the right amount of the right kind of carbs so you have the nutrition and the energy you need: 
· Eat 3 to 8 servings of grains (breads, cereals, rice, pasta) each day. For example, a serving is 1 slice of bread, 1 cup of boxed cereal, or ½ cup of cooked rice, cooked pasta, or cooked cereal. Go to www. choosemyplate.gov to learn how many servings you need. ¨ Buy bread that lists whole wheat (or other whole grains), stone-ground wheat, or cracked wheat as the first ingredient. ¨ Eat brown rice, bulgur, or millet instead of white rice. ¨ Eat pasta and cereals made from whole grain flour instead of refined flour. · Eat several servings a day of fresh fruits and vegetables. These include raspberries, apples, figs, oranges, pears, prunes, broccoli, brussels sprouts, carrots, corn, peas, and beans. And there are lots of other fruits and vegetables to choose from. · Limit the amount of candy, desserts, and soda in your diet. Where can you learn more? Go to http://roly-sheela.info/. Enter F199 in the search box to learn more about \"Learning About Carbohydrates. \" Current as of: May 12, 2017 Content Version: 11.4 © 2202-6613 [employer]. Care instructions adapted under license by LeanWagon (which disclaims liability or warranty for this information). If you have questions about a medical condition or this instruction, always ask your healthcare professional. [address] any warranty or liability for your use of this information. Introducing hospitals & HEALTH SERVICES! Dear Peggy Blanchard: Thank you for requesting a Oxagen account.   Our records indicate that you already have an active Swapsee account. You can access your account anytime at [email or web address] Did you know that you can access your hospital and ER discharge instructions at any time in Swapsee? You can also review all of your test results from your hospital stay or ER visit. Additional Information If you have questions, please visit the Frequently Asked Questions section of the Swapsee website at [email or web address] Remember, Swapsee is NOT to be used for urgent needs. For medical emergencies, dial 911. Now available from your iPhone and Android! Please provide this summary of care documentation to your next provider. Your primary care clinician is listed as Colletta Stains. If you have any questions after today's visit, please call [phone].

## 2018-05-24 NOTE — PATIENT INSTRUCTIONS
It was a pleasure to see you! As discussed:    I have given you a referral to the [facility] SERVICES P.R.E.P (Physician Referred Exercise program). Please visit: [email or web address] for more information.     -Decrease carbohydrates to 150-200g/ day. Health Maintenance   I have ordered your age appropriate labs please complete them. You will need to fast 10-12hrs before your appointment. Start paying more attention to your diet and start exercising. Goal for exercise is 150minutes of moderate exercise a week and diet goal is to eat 50% fruits and vegetables with minimal sugar, fat and oil daily. See health.gov or choosemyplate.gov for more details. Your cervical cancer and breast cancer screening will be completed by your ob/ gyn as scheduled. Learning About Carbohydrates  What are carbohydrates? Carbohydrates are an important nutrient you get from food. It's a great source of energy for your body and helps your brain and nervous system work properly. How does your body use carbohydrates? After you eat food with carbs in it, your body digests the carbohydrates and turns them into a kind of sugar that goes into your blood. The blood carries this sugar to the cells in your body. The cells use the sugar to give you energy. Extra sugar is stored in the cells for later use. If it isn't used, it turns into fat. Where do carbohydrates come from? The healthiest carbohydrate choices are breads, cereals, and pastas made with whole grains; brown rice; low-fat dairy products; vegetables; legumes such as peas, lentils, and beans; and fruits. Foods made from refined flour, including bread, pasta, doughnuts, cookies, and desserts, also contain carbohydrates. So do sweets such as candy and soda. How can you get the right kind and amount of carbs? Eating too much of anything can lead to weight gain. And that can lead to other health problems.   Here are some tips to help you eat the right amount of the right kind of carbs so you have the nutrition and the energy you need:  · Eat 3 to 8 servings of grains (breads, cereals, rice, pasta) each day. For example, a serving is 1 slice of bread, 1 cup of boxed cereal, or ½ cup of cooked rice, cooked pasta, or cooked cereal. Go to www. choosemyplate.gov to learn how many servings you need. ¨ Buy bread that lists whole wheat (or other whole grains), stone-ground wheat, or cracked wheat as the first ingredient. ¨ Eat brown rice, bulgur, or millet instead of white rice. ¨ Eat pasta and cereals made from whole grain flour instead of refined flour. · Eat several servings a day of fresh fruits and vegetables. These include raspberries, apples, figs, oranges, pears, prunes, broccoli, brussels sprouts, carrots, corn, peas, and beans. And there are lots of other fruits and vegetables to choose from. · Limit the amount of candy, desserts, and soda in your diet. Where can you learn more? Go to http://roly-sheela.info/. Enter F199 in the search box to learn more about \"Learning About Carbohydrates. \"  Current as of: May 12, 2017  Content Version: 11.4  © 7101-5064 Healthwise, [employer]. Care instructions adapted under license by Poynt (which disclaims liability or warranty for this information). If you have questions about a medical condition or this instruction, always ask your healthcare professional. [place] [address] any warranty or liability for your use of this information.

## 2018-05-26 LAB
25(OH)D3+25(OH)D2 SERPL-MCNC: 32.1 NG/ML (ref 30–100)
ALBUMIN SERPL-MCNC: 3.9 G/DL (ref 3.5–5.5)
ALBUMIN/GLOB SERPL: 1.3 {RATIO} (ref 1.2–2.2)
ALP SERPL-CCNC: 50 IU/L (ref 39–117)
ALT SERPL-CCNC: 8 IU/L (ref 0–32)
AST SERPL-CCNC: 11 IU/L (ref 0–40)
BASOPHILS # BLD AUTO: 0 X10E3/UL (ref 0–0.2)
BASOPHILS NFR BLD AUTO: 1 %
BILIRUB SERPL-MCNC: 0.3 MG/DL (ref 0–1.2)
BUN SERPL-MCNC: 15 MG/DL (ref 6–24)
BUN/CREAT SERPL: 15 (ref 9–23)
CALCIUM SERPL-MCNC: 9.2 MG/DL (ref 8.7–10.2)
CHLORIDE SERPL-SCNC: 102 MMOL/L (ref 96–106)
CHOLEST SERPL-MCNC: 219 MG/DL (ref 100–199)
CO2 SERPL-SCNC: 20 MMOL/L (ref 18–29)
CREAT SERPL-MCNC: 0.97 MG/DL (ref 0.57–1)
EOSINOPHIL # BLD AUTO: 0.1 X10E3/UL (ref 0–0.4)
EOSINOPHIL NFR BLD AUTO: 1 %
ERYTHROCYTE [DISTWIDTH] IN BLOOD BY AUTOMATED COUNT: 14.9 % (ref 12.3–15.4)
EST. AVERAGE GLUCOSE BLD GHB EST-MCNC: 117 MG/DL
FT4I SERPL CALC-MCNC: 1.6 (ref 1.2–4.9)
GFR SERPLBLD CREATININE-BSD FMLA CKD-EPI: 71 ML/MIN/1.73
GFR SERPLBLD CREATININE-BSD FMLA CKD-EPI: 82 ML/MIN/1.73
GLOBULIN SER CALC-MCNC: 2.9 G/DL (ref 1.5–4.5)
GLUCOSE SERPL-MCNC: 94 MG/DL (ref 65–99)
HBA1C MFR BLD: 5.7 % (ref 4.8–5.6)
HCT VFR BLD AUTO: 38.8 % (ref 34–46.6)
HDLC SERPL-MCNC: 51 MG/DL
HGB BLD-MCNC: 12.6 G/DL (ref 11.1–15.9)
IMM GRANULOCYTES # BLD: 0 X10E3/UL (ref 0–0.1)
IMM GRANULOCYTES NFR BLD: 0 %
LDLC SERPL CALC-MCNC: 146 MG/DL (ref 0–99)
LYMPHOCYTES # BLD AUTO: 2.2 X10E3/UL (ref 0.7–3.1)
LYMPHOCYTES NFR BLD AUTO: 25 %
MCH RBC QN AUTO: 27.8 PG (ref 26.6–33)
MCHC RBC AUTO-ENTMCNC: 32.5 G/DL (ref 31.5–35.7)
MCV RBC AUTO: 86 FL (ref 79–97)
MONOCYTES # BLD AUTO: 0.4 X10E3/UL (ref 0.1–0.9)
MONOCYTES NFR BLD AUTO: 5 %
NEUTROPHILS # BLD AUTO: 6.1 X10E3/UL (ref 1.4–7)
NEUTROPHILS NFR BLD AUTO: 68 %
PLATELET # BLD AUTO: 348 X10E3/UL (ref 150–379)
POTASSIUM SERPL-SCNC: 4.3 MMOL/L (ref 3.5–5.2)
PROT SERPL-MCNC: 6.8 G/DL (ref 6–8.5)
RBC # BLD AUTO: 4.53 X10E6/UL (ref 3.77–5.28)
SODIUM SERPL-SCNC: 140 MMOL/L (ref 134–144)
T3RU NFR SERPL: 22 % (ref 24–39)
T4 SERPL-MCNC: 7.1 UG/DL (ref 4.5–12)
TRIGL SERPL-MCNC: 112 MG/DL (ref 0–149)
TSH SERPL DL<=0.005 MIU/L-ACNC: 2.45 UIU/ML (ref 0.45–4.5)
VLDLC SERPL CALC-MCNC: 22 MG/DL (ref 5–40)
WBC # BLD AUTO: 8.8 X10E3/UL (ref 3.4–10.8)

## 2018-06-20 NOTE — PROGRESS NOTES
Farhan Reardon. Ruiz Simons,  Thank you for completing your labs which show:  -Your cholesterol is elevated but fortunately based on your risk factors a statin is not indicated. Continue to work on optimizing your weight (goal lose at least 5% body weight), healthy eating and cardiovascular disease (Recommendation: reduce carbs to 150-200g/ day and the Mediterranean Diet). -Your A1c or 3 month marker of your blood sugar is mildly elevated, indicating prediabetes we will recheck this level at your next appointment. In the meantime work on optimizing your diet as we discussed at your appointment. Lab Review  The remainder of your labs were clinically normal/ stable  Some labs that may have been tested and their explanation are:  Your electrolytes, kidney & liver function (Metabolic Panel)   Anemia, blood cells (CBC)  Thyroid (TSH + T4, T3)  Hormones (prolactin, vitamin D )   Pregnancy (Beta HCG)    Diabetes (Hemoglobin A1c)   Lipid Panel (Cholesterol, HDL \"good\", LDL \"bad\")     Do not hesitate to contact the office if you have any questions or concerns before your next appointment.    Kind regards,   Dr. Dmitri Moise

## 2018-06-25 ENCOUNTER — OFFICE VISIT (OUTPATIENT)
Dept: NEUROLOGY | Age: 43
End: 2018-06-25

## 2018-06-25 VITALS
SYSTOLIC BLOOD PRESSURE: 134 MMHG | RESPIRATION RATE: 18 BRPM | WEIGHT: 172 LBS | HEART RATE: 73 BPM | BODY MASS INDEX: 29.73 KG/M2 | OXYGEN SATURATION: 99 % | DIASTOLIC BLOOD PRESSURE: 84 MMHG

## 2018-06-25 DIAGNOSIS — G43.009 MIGRAINE WITHOUT AURA AND WITHOUT STATUS MIGRAINOSUS, NOT INTRACTABLE: Primary | ICD-10-CM

## 2018-06-25 RX ORDER — SUMATRIPTAN 50 MG/1
50 TABLET, FILM COATED ORAL
Qty: 18 TAB | Refills: 3 | Status: SHIPPED | OUTPATIENT
Start: 2018-06-25 | End: 2018-06-25

## 2018-06-25 NOTE — PROGRESS NOTES
Chief Complaint   Patient presents with    Migraine       HPI    57-year-old woman with migraine headaches here to follow-up. When I last saw her in January she was having 2 or 3 migraines per month and now since then her headaches have reduced in frequency to about once a month which respond very well to Imitrex orally and sometimes she escalates the Zomig nasal spray. She is learning to optimize her sleep. She is being treated for high blood pressure. She still gets mild bitemporal pressure-like tension headaches at the end of the day depending on her work level but otherwise is doing okay. Migraine medication history: Sumatriptan, zolmitriptan, nortriptyline    Review of Systems   Neurological: Positive for headaches. All other systems reviewed and are negative. Past Medical History:   Diagnosis Date    Dense breasts     GERD (gastroesophageal reflux disease)     Headache     Hypertension     Ill-defined condition     heart murmer    Nausea & vomiting     Sinus congestion      Family History   Problem Relation Age of Onset    Breast Cancer Mother 79    Diabetes Mother     Hypertension Mother     Stroke Mother     Cancer Father      Prostate    Hypertension Father     Hypertension Sister     Hypertension Brother      Social History     Social History    Marital status: SINGLE     Spouse name: N/A    Number of children: N/A    Years of education: N/A     Occupational History    Not on file. Social History Main Topics    Smoking status: Never Smoker    Smokeless tobacco: Never Used    Alcohol use 0.6 oz/week     1 Shots of liquor, 0 Standard drinks or equivalent per week    Drug use: No    Sexual activity: Yes     Birth control/ protection: Condom     Other Topics Concern    Not on file     Social History Narrative    HABITS:  No alcohol abuse,  Occasional alcohol use. No cigarette or drug abuse.           SOCIAL HISTORY:  Patient is single.   The mother of a daughter 16 and 15years old. 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 Master's from [employer].          FAMILY HISTORY:  Father is 80 with a history of prostate cancer. Mother with diabetes and breast cancer, dyslipidemia and hypertension. Eight brothers and sisters. Patient is a , of twelve years duration. Her  was a principal and . Allergies   Allergen Reactions    Metronidazole Rash    Stadol [Butorphanol Tartrate] Palpitations         Current Outpatient Prescriptions   Medication Sig    SUMAtriptan (IMITREX) 50 mg tablet Take 1 Tab by mouth once as needed for Migraine for up to 1 dose.  hydroCHLOROthiazide (MICROZIDE) 12.5 mg capsule TAKE ONE CAPSULE BY MOUTH EVERY DAY    ZOLMitriptan (ZOMIG) 2.5 mg spry 1 Spray by Nasal route once as needed for up to 1 dose.  herbal drugs (COLON HERBAL CLEANSER) cap Take 2 Caps by mouth daily.  omeprazole (PRILOSEC) 20 mg capsule Take 20 mg by mouth daily. Indications: GASTROESOPHAGEAL REFLUX    multivitamin (ONE A DAY) tablet Take 1 Tab by mouth daily. Indications: VITAMIN DEFICIENCY PREVENTION    ondansetron (ZOFRAN ODT) 4 mg disintegrating tablet Take 1 Tab by mouth every eight (8) hours as needed for Nausea.  oxyCODONE-acetaminophen (PERCOCET) 5-325 mg per tablet Take 1-2 Tabs by mouth every four (4) hours as needed for Pain. Max Daily Amount: 12 Tabs. No current facility-administered medications for this visit. Neurologic Exam     Mental Status        WD/WN adult in NAD, normal grooming  VSS  A&O x 3    PERRL, nonicteric  Face is symmetric, tongue midline  Speech is fluent and clear  No limb ataxia. No abnl movements.   Moving all extemities spontaneously and symmetric  Normal gait    CVS RRR  Lungs nonlabored  Skin is warm and dry         Visit Vitals    /84    Pulse 73    Resp 18    Wt 78 kg (172 lb)    SpO2 99%    BMI 29.73 kg/m2 Assessment and Plan   Diagnoses and all orders for this visit:    1. Migraine without aura and without status migrainosus, not intractable    Other orders  -     SUMAtriptan (IMITREX) 50 mg tablet; Take 1 Tab by mouth once as needed for Migraine for up to 1 dose. 68-year-old woman having very infrequent migraine which she should continue to take Imitrex as needed. She is well controlled currently. I would defer any additional medications. She is welcome to follow-up with me for refills or she can see primary care. I reviewed and decided to continue the current medications.       [address] [place] [address], [facility] [address]  Diplomate ABPN

## 2018-06-25 NOTE — MR AVS SNAPSHOT
[facility] [address] [place] 
[phone] Patient: Lionel King MRN: [id number] :1975 Visit Information Date & Time Provider Department Dept. Phone Encounter #  
 2018  8:20 AM DO Hailee Myers [facility] Neurology Clinic at [address] [id number] Follow-up Instructions Return if symptoms worsen or fail to improve. Routing History Your Appointments 2018 10:00 AM  
PHYSICAL with Nory Martinez MD  
[address] [facility] Internal Medicine [facility]t Note: cpe  
 [address] Critical access hospital [address]  
[address] Upcoming Health Maintenance Date Due DTaP/Tdap/Td series (1 - Tdap) 10/9/1994 Influenza Age 5 to Adult 2018 BREAST CANCER SCRN MAMMOGRAM 3/13/2019 PAP AKA CERVICAL CYTOLOGY 2020 Allergies as of 2018  Review Complete On: 2018 By: 8[address] [facility],  Severity Noted Reaction Type Reactions Metronidazole  2015    Rash Stadol [Butorphanol Tartrate]  2015    Palpitations Current Immunizations  Never Reviewed No immunizations on file. Not reviewed this visit You Were Diagnosed With   
  
 Codes Comments Migraine without aura and without status migrainosus, not intractable    -  Primary ICD-10-CM: G43.009 ICD-9-CM: 346.10 Vitals BP Pulse Resp Weight(growth percentile) SpO2 BMI  
 134/84 73 18 172 lb (78 kg) 99% 29.73 kg/m2 OB Status Smoking Status Having regular periods Never Smoker Vitals History BMI and BSA Data Body Mass Index Body Surface Area  
 29.73 kg/m 2 1.87 m 2 Preferred Pharmacy Pharmacy Name Phone [id number] [place] Se[address], 55 [facility] [phone] Your Updated Medication List  
  
   
This list is accurate as of 6/25/18  8:34 AM.  Always use your most recent med list.  
  
  
  
  
 COLON HERBAL CLEANSER Cap Generic drug:  herbal drugs Take 2 Caps by mouth daily. hydroCHLOROthiazide 12.5 mg capsule Commonly known as:  Eva Sera TAKE ONE CAPSULE BY MOUTH EVERY DAY  
  
 multivitamin tablet Commonly known as:  ONE A DAY Take 1 Tab by mouth daily. Indications: VITAMIN DEFICIENCY PREVENTION  
  
 omeprazole 20 mg capsule Commonly known as:  PRILOSEC Take 20 mg by mouth daily. Indications: GASTROESOPHAGEAL REFLUX  
  
 ondansetron 4 mg disintegrating tablet Commonly known as:  ZOFRAN ODT Take 1 Tab by mouth every eight (8) hours as needed for Nausea. oxyCODONE-acetaminophen 5-325 mg per tablet Commonly known as:  PERCOCET Take 1-2 Tabs by mouth every four (4) hours as needed for Pain. Max Daily Amount: 12 Tabs. SUMAtriptan 50 mg tablet Commonly known as:  IMITREX Take 1 Tab by mouth once as needed for Migraine for up to 1 dose. ZOLMitriptan 2.5 mg Spry Commonly known as:  ZOMIG  
1 Spray by Nasal route once as needed for up to 1 dose. Prescriptions Sent to Pharmacy Refills SUMAtriptan (IMITREX) 50 mg tablet 3 Sig: Take 1 Tab by mouth once as needed for Migraine for up to 1 dose. Class: Normal  
 Pharmacy: [facility]pharmacy [address] [place] [address] #: [phone] Route: Oral  
  
Follow-up Instructions Return if symptoms worsen or fail to improve. Introducing [facility] & HEALTH SERVICES! Dear Shelley Carmen: Thank you for requesting a Keenjar account. Our records indicate that you already have an active Keenjar account. You can access your account anytime at [email or web address] Did you know that you can access your hospital and ER discharge instructions at any time in Keenjar?   You can also review all of your test results from your hospital stay or ER visit. Additional Information If you have questions, please visit the Frequently Asked Questions section of the Thoof website at [email or web address] Remember, Thoof is NOT to be used for urgent needs. For medical emergencies, dial 911. Now available from your iPhone and Android! Please provide this summary of care documentation to your next provider. Your primary care clinician is listed as Vale Darden. If you have any questions after today's visit, please call [phone].

## 2018-07-26 RX ORDER — HYDROCHLOROTHIAZIDE 12.5 MG/1
CAPSULE ORAL
Qty: 90 CAP | Refills: 2 | Status: SHIPPED | OUTPATIENT
Start: 2018-07-26 | End: 2019-05-06 | Stop reason: SDUPTHER

## 2018-09-26 ENCOUNTER — OFFICE VISIT (OUTPATIENT)
Dept: INTERNAL MEDICINE CLINIC | Age: 43
End: 2018-09-26

## 2018-09-26 VITALS
HEIGHT: 64 IN | TEMPERATURE: 97.9 F | WEIGHT: 165 LBS | RESPIRATION RATE: 16 BRPM | HEART RATE: 77 BPM | BODY MASS INDEX: 28.17 KG/M2 | DIASTOLIC BLOOD PRESSURE: 80 MMHG | SYSTOLIC BLOOD PRESSURE: 118 MMHG | OXYGEN SATURATION: 98 %

## 2018-09-26 DIAGNOSIS — Z23 ENCOUNTER FOR IMMUNIZATION: ICD-10-CM

## 2018-09-26 DIAGNOSIS — Z00.00 WELL WOMAN EXAM (NO GYNECOLOGICAL EXAM): Primary | ICD-10-CM

## 2018-09-26 DIAGNOSIS — K59.09 CHRONIC CONSTIPATION: ICD-10-CM

## 2018-09-26 DIAGNOSIS — I10 ESSENTIAL HYPERTENSION: ICD-10-CM

## 2018-09-26 DIAGNOSIS — Z77.120 MOLD EXPOSURE: ICD-10-CM

## 2018-09-26 DIAGNOSIS — Z12.11 COLON CANCER SCREENING: ICD-10-CM

## 2018-09-26 RX ORDER — SUMATRIPTAN 50 MG/1
50 TABLET, FILM COATED ORAL
COMMUNITY
Start: 2018-06-25 | End: 2018-11-21

## 2018-09-26 RX ORDER — DICLOFENAC SODIUM 50 MG/1
TABLET, DELAYED RELEASE ORAL
COMMUNITY
Start: 2018-06-28 | End: 2018-11-21 | Stop reason: SDDI

## 2018-09-26 NOTE — PROGRESS NOTES
HISTORY OF PRESENT ILLNESS Josee Ernandez is a 38 y.o. female for A1c  
HPI Health Maintenance Topic Date Due  
 DTaP/Tdap/Td series (1 - Tdap) 10/09/1994  Influenza Age 5 to Adult  08/01/2018  BREAST CANCER SCRN MAMMOGRAM  03/13/2019  PAP AKA CERVICAL CYTOLOGY  02/28/2020 Cardiovascular Review The patient has hypertension and Body mass index is 28.52 kg/(m^2). has lost 7lbs. Diet and Lifestyle: generally follows a low fat low cholesterol diet, generally follows a low sodium diet, exercises regularly, nonsmoker ACAC did not workout. Taking B12/ HCG drops. Following clean diet. Home BP Monitoring: is not measured at home. Pertinent ROS: taking medications as instructed, no medication side effects noted, no TIA's, no chest pain on exertion, no dyspnea on exertion, no swelling of ankles. Interval History Had shoulder injury followed by orthopedists. Dr. Anastasia Kelley Mold Exposure She was exposed to mold at her residence. Daughters have had rash and respiratory symptoms. Josee Ernandez has not had any symptoms. She would would to see a Review of Systems Gastrointestinal: Positive for constipation (lifelong. Never had evaluation. ). per HPI Patient Active Problem List  
 Diagnosis Date Noted  DDD (degenerative disc disease), cervical 10/18/2016  Hypertension  Headache  Arrhythmia  Dense breasts  Sinus congestion Current Outpatient Prescriptions Medication Sig Dispense Refill  diclofenac EC (VOLTAREN) 50 mg EC tablet TAKE 1 TABLET BY MOUTH TWICE A DAY WITH FOOD  SUMAtriptan (IMITREX) 50 mg tablet Take 50 mg by mouth.  hydroCHLOROthiazide (MICROZIDE) 12.5 mg capsule TAKE ONE CAPSULE BY MOUTH EVERY DAY 90 Cap 2  
 ZOLMitriptan (ZOMIG) 2.5 mg spry 1 Spray by Nasal route once as needed for up to 1 dose. 4 Each 3  
 herbal drugs (COLON HERBAL CLEANSER) cap Take 2 Caps by mouth daily.  omeprazole (PRILOSEC) 20 mg capsule Take 20 mg by mouth daily. Indications: GASTROESOPHAGEAL REFLUX  multivitamin (ONE A DAY) tablet Take 1 Tab by mouth daily. Indications: VITAMIN DEFICIENCY PREVENTION  ondansetron (ZOFRAN ODT) 4 mg disintegrating tablet Take 1 Tab by mouth every eight (8) hours as needed for Nausea. 20 Tab 0  
 oxyCODONE-acetaminophen (PERCOCET) 5-325 mg per tablet Take 1-2 Tabs by mouth every four (4) hours as needed for Pain. Max Daily Amount: 12 Tabs. 90 Tab 0 Allergies Allergen Reactions  Metronidazole Rash  Stadol [Butorphanol Tartrate] Palpitations  Butorphanol Palpitations Visit Vitals  /80 (BP 1 Location: Right arm, BP Patient Position: Sitting)  Pulse 77  Temp 97.9 °F (36.6 °C) (Oral)  Resp 16  
 Ht 5' 3.78\" (1.62 m)  Wt 165 lb (74.8 kg)  SpO2 98%  BMI 28.52 kg/m2 Physical Exam  
Constitutional: She is oriented to person, place, and time. She appears well-developed and well-nourished. HENT:  
Right Ear: External ear normal.  
Left Ear: External ear normal.  
Mouth/Throat: Oropharynx is clear and moist. No oropharyngeal exudate. Eyes: Conjunctivae are normal. No scleral icterus. Neck: Normal range of motion. Neck supple. No thyromegaly present. Cardiovascular: Normal rate, regular rhythm and normal heart sounds. Exam reveals no gallop and no friction rub. No murmur heard. Pulmonary/Chest: Effort normal and breath sounds normal. No respiratory distress. She has no wheezes. She has no rales. She exhibits no tenderness. Abdominal: Soft. Bowel sounds are normal. She exhibits no distension. There is no tenderness. There is no rebound and no guarding. Genitourinary: Rectal exam shows guaiac negative stool. Genitourinary Comments: Deferred for gyn per pt request  
Musculoskeletal: Normal range of motion. She exhibits no edema or tenderness. Neurological: She is alert and oriented to person, place, and time. Skin:  
 
  
 
 
ASSESSMENT and PLAN Diagnoses and all orders for this visit: 
 
1. Well woman exam (no gynecological exam)- Health Maintenance reviewed and addressed as ordered below 2. Essential hypertension- well controlled. Counseled on diet and exercise. Continue current regimen. 3. Encounter for immunization-  received in clinic without complication. 
-     TETANUS, DIPHTHERIA TOXOIDS AND ACELLULAR PERTUSSIS VACCINE (TDAP), IN INDIVIDS. >=7, IM 
-     INFLUENZA VIRUS VACCINE QUADRIVALENT, PRESERVATIVE FREE SYRINGE (79250) -     [place] IMMUNIZ ADMIN,1 SINGLE/COMB VAC/TOXOID 4. Chronic constipation- lifelong. Well controlled with herbal supplement. Has never had evaluation. 5. Colon cancer screening- 38 yo in October. Needs colon cancer screening risks and benefits of various modalities reviewed. Pt opted for annual stool testing with the understanding that she will need a colonoscopy if the FOBT is positive. -     OCCULT BLOOD IMMUNOASSAY,DIAGNOSTIC 6. Mold exposure- asx. Requests allergy referral for assessment. Red flags to warrant ER or earlier clinical evaluation reviewed. -     REFERRAL TO ALLERGY Follow-up Disposition: 
Return in about 6 months (around 3/26/2019) for Follow-up, Hypertension, Weight Management, Labs completed 2 weeks before appointment. Medication risks/benefits/costs/interactions/alternatives discussed with patient. Gloria Junior  was given an after visit summary which includes diagnoses, current medications, & vitals. she expressed understanding with the diagnosis and plan.

## 2018-09-26 NOTE — PROGRESS NOTES
Patient received Tdap in office today. Administered Boostrix 0.5 ml in right deltoid, IM. Pt tolerated well Patient received flu vaccine in office today. Administered Fluarix 0.5 ml in left deltoid, IM. Pt tolerated well

## 2018-09-26 NOTE — PATIENT INSTRUCTIONS
It was a pleasure to see you! As discussed: 
 
Congratulations on your weight loss and positive lifestyle changes!!! High Blood Pressure (BP) Well controlled today 
-Continue to check your BP at home  
-Follow a low sodium diet (<1500mg/ day) -Exercise regularly goal, 150 minutes of cardiovascular exercise/ week 
-If your blood pressure is too low (<90/60) or too high (>180/100) or you have any symptoms such as chest pain, dizziness, shortness of breath- seek immediate medical attention. See  Www.health.gov for more information. You received the tetanus/pertussis  And flu vaccine today. Please see vaccine handout for more information and let us know if you have any reactions or concerns after the vaccination. Well Visit, Ages 25 to 48: Care Instructions Your Care Instructions Physical exams can help you stay healthy. Your doctor has checked your overall health and may have suggested ways to take good care of yourself. He or she also may have recommended tests. At home, you can help prevent illness with healthy eating, regular exercise, and other steps. Follow-up care is a key part of your treatment and safety. Be sure to make and go to all appointments, and call your doctor if you are having problems. It's also a good idea to know your test results and keep a list of the medicines you take. How can you care for yourself at home? · Reach and stay at a healthy weight. This will lower your risk for many problems, such as obesity, diabetes, heart disease, and high blood pressure. · Get at least 30 minutes of physical activity on most days of the week. Walking is a good choice. You also may want to do other activities, such as running, swimming, cycling, or playing tennis or team sports. Discuss any changes in your exercise program with your doctor. · Do not smoke or allow others to smoke around you. If you need help quitting, talk to your doctor about stop-smoking programs and medicines. These can increase your chances of quitting for good. · Talk to your doctor about whether you have any risk factors for sexually transmitted infections (STIs). Having one sex partner (who does not have STIs and does not have sex with anyone else) is a good way to avoid these infections. · Use birth control if you do not want to have children at this time. Talk with your doctor about the choices available and what might be best for you. · Protect your skin from too much sun. When you're outdoors from 10 a.m. to 4 p.m., stay in the shade or cover up with clothing and a hat with a wide brim. Wear sunglasses that block UV rays. Even when it's cloudy, put broad-spectrum sunscreen (SPF 30 or higher) on any exposed skin. · See a dentist one or two times a year for checkups and to have your teeth cleaned. · Wear a seat belt in the car. · Drink alcohol in moderation, if at all. That means no more than 2 drinks a day for men and 1 drink a day for women. Follow your doctor's advice about when to have certain tests. These tests can spot problems early. For everyone · Cholesterol. Have the fat (cholesterol) in your blood tested after age 21. Your doctor will tell you how often to have this done based on your age, family history, or other things that can increase your risk for heart disease. · Blood pressure. Have your blood pressure checked during a routine doctor visit. Your doctor will tell you how often to check your blood pressure based on your age, your blood pressure results, and other factors. · Vision. Talk with your doctor about how often to have a glaucoma test. 
· Diabetes. Ask your doctor whether you should have tests for diabetes. · Colon cancer. Have a test for colon cancer at age 48. You may have one of several tests. If you are younger than 48, you may need a test earlier if you have any risk factors.  Risk factors include whether you already had a precancerous polyp removed from your colon or whether your parent, brother, sister, or child has had colon cancer. For women · Breast exam and mammogram. Talk to your doctor about when you should have a clinical breast exam and a mammogram. Medical experts differ on whether and how often women under 50 should have these tests. Your doctor can help you decide what is right for you. · Pap test and pelvic exam. Begin Pap tests at age 24. A Pap test is the best way to find cervical cancer. The test often is part of a pelvic exam. Ask how often to have this test. 
· Tests for sexually transmitted infections (STIs). Ask whether you should have tests for STIs. You may be at risk if you have sex with more than one person, especially if your partners do not wear condoms. For men · Tests for sexually transmitted infections (STIs). Ask whether you should have tests for STIs. You may be at risk if you have sex with more than one person, especially if you do not wear a condom. · Testicular cancer exam. Ask your doctor whether you should check your testicles regularly. · Prostate exam. Talk to your doctor about whether you should have a blood test (called a PSA test) for prostate cancer. Experts differ on whether and when men should have this test. Some experts suggest it if you are older than 39 and are -American or have a father or brother who got prostate cancer when he was younger than 72. When should you call for help? Watch closely for changes in your health, and be sure to contact your doctor if you have any problems or symptoms that concern you. Where can you learn more? Go to http://roly-sheela.info/. Enter P072 in the search box to learn more about \"Well Visit, Ages 25 to 48: Care Instructions. \" Current as of: May 16, 2017 Content Version: 11.7 © 6359-9329 [employer].  Care instructions adapted under license by [address] (which disclaims liability or warranty for this information). If you have questions about a medical condition or this instruction, always ask your healthcare professional. [address] any warranty or liability for your use of this information.

## 2018-09-26 NOTE — MR AVS SNAPSHOT
[address] 
[phone] Patient: Fernanda Garner MRN: [id number] :1975 Visit Information Date & Time Provider Department Dept. Phone Encounter #  
 2018 10:00 AM Judie Little MD [facility]  Follow-up Instructions Return in about 6 months (around 3/26/2019) for Follow-up, Hypertension, Weight Management, Labs completed 2 weeks before appointment. Upcoming Health Maintenance Date Due DTaP/Tdap/Td series (1 - Tdap) 10/9/1994 Influenza Age 5 to Adult 2018 BREAST CANCER SCRN MAMMOGRAM 3/13/2019 PAP AKA CERVICAL CYTOLOGY 2020 Allergies as of 2018  Review Complete On: 2018 By: Judie Little MD  
  
 Severity Noted Reaction Type Reactions Metronidazole  2015    Rash Stadol [Butorphanol Tartrate]  2015    Palpitations Butorphanol Low 2015    Palpitations Current Immunizations  Never Reviewed Name Date Influenza Vaccine (Quad) PF  Incomplete Tdap  Incomplete Not reviewed this visit You Were Diagnosed With   
  
 Codes Comments Essential hypertension    -  Primary ICD-10-CM: I10 
ICD-9-CM: 401.9 Well woman exam (no gynecological exam)     ICD-10-CM: Z00.00 ICD-9-CM: V70.0 Encounter for immunization     ICD-10-CM: N20 ICD-9-CM: V03.89 Chronic constipation     ICD-10-CM: K59.09 
ICD-9-CM: 564.00 Colon cancer screening     ICD-10-CM: Z12.11 ICD-9-CM: V76.51 Mold exposure     ICD-10-CM: X30.809 ICD-9-CM: V87.31 Vitals BP Pulse Temp Resp Height(growth percentile) Weight(growth percentile) 118/80 (BP 1 Location: Right arm, BP Patient Position: Sitting) 77 97.9 °F (36.6 °C) (Oral) 16 5' 3.78\" (1.62 m) 165 lb (74.8 kg) LMP SpO2 BMI OB Status Smoking Status 09/12/2018 (Approximate) 98% 28.52 kg/m2 Having regular periods Never Smoker Vitals History BMI and BSA Data Body Mass Index Body Surface Area 28.52 kg/m 2 1.83 m 2 Preferred Pharmacy Pharmacy Name Phone [id number] Bushra Sa, [address] [place] [address] [phone] Your Updated Medication List  
  
   
This list is accurate as of 9/26/18 10:52 AM.  Always use your most recent med list.  
  
  
  
  
 COLON HERBAL CLEANSER Cap Generic drug:  herbal drugs Take 2 Caps by mouth daily. diclofenac EC 50 mg EC tablet Commonly known as:  VOLTAREN  
TAKE 1 TABLET BY MOUTH TWICE A DAY WITH FOOD  
  
 hydroCHLOROthiazide 12.5 mg capsule Commonly known as:  [place] Dense TAKE ONE CAPSULE BY MOUTH EVERY DAY  
  
 multivitamin tablet Commonly known as:  ONE A DAY Take 1 Tab by mouth daily. Indications: VITAMIN DEFICIENCY PREVENTION  
  
 omeprazole 20 mg capsule Commonly known as:  PRILOSEC Take 20 mg by mouth daily. Indications: GASTROESOPHAGEAL REFLUX  
  
 ondansetron 4 mg disintegrating tablet Commonly known as:  ZOFRAN ODT Take 1 Tab by mouth every eight (8) hours as needed for Nausea. oxyCODONE-acetaminophen 5-325 mg per tablet Commonly known as:  PERCOCET Take 1-2 Tabs by mouth every four (4) hours as needed for Pain. Max Daily Amount: 12 Tabs. SUMAtriptan 50 mg tablet Commonly known as:  IMITREX Take 50 mg by mouth. ZOLMitriptan 2.5 mg Spry Commonly known as:  ZOMIG  
1 Spray by Nasal route once as needed for up to 1 dose. We Performed the Following INFLUENZA VIRUS VAC QUAD,SPLIT,PRESV FREE SYRINGE IM [id number] CPT(R)] OCCULT BLOOD IMMUNOASSAY,DIAGNOSTIC [04410 CPT(R)] [place] IMMUNIZ ADMIN,1 SINGLE/COMB VAC/TOXOID [id number] CPT(R)] REFERRAL TO ALLERGY [REF5 Custom] TETANUS, DIPHTHERIA TOXOIDS AND ACELLULAR PERTUSSIS VACCINE (TDAP), IN INDIVIDS. >=7, IM [id number] CPT(R)] Follow-up Instructions Return in about 6 months (around 3/26/2019) for Follow-up, Hypertension, Weight Management, Labs completed 2 weeks before appointment. Referral Information Referral ID Referred By Referred To  
  
 [id number] [facility] [address]   
   [place] [address] [facility], [address] [place] [address] Visits Status Start Date End Date 1 New Request 9/26/18 9/26/19 If your referral has a status of pending review or denied, additional information will be sent to support the outcome of this decision. Patient Instructions It was a pleasure to see you! As discussed: 
 
Congratulations on your weight loss and positive lifestyle changes!!! High Blood Pressure (BP) Well controlled today 
-Continue to check your BP at home  
-Follow a low sodium diet (<1500mg/ day) -Exercise regularly goal, 150 minutes of cardiovascular exercise/ week 
-If your blood pressure is too low (<90/60) or too high (>180/100) or you have any symptoms such as chest pain, dizziness, shortness of breath- seek immediate medical attention. See  Www.health.gov for more information. You received the tetanus/pertussis  And flu vaccine today. Please see vaccine handout for more information and let us know if you have any reactions or concerns after the vaccination. Well Visit, Ages 25 to 48: Care Instructions Your Care Instructions Physical exams can help you stay healthy. Your doctor has checked your overall health and may have suggested ways to take good care of yourself. He or she also may have recommended tests. At home, you can help prevent illness with healthy eating, regular exercise, and other steps. Follow-up care is a key part of your treatment and safety. Be sure to make and go to all appointments, and call your doctor if you are having problems.  It's also a good idea to know your test results and keep a list of the medicines you take. How can you care for yourself at home? · Reach and stay at a healthy weight. This will lower your risk for many problems, such as obesity, diabetes, heart disease, and high blood pressure. · Get at least 30 minutes of physical activity on most days of the week. Walking is a good choice. You also may want to do other activities, such as running, swimming, cycling, or playing tennis or team sports. Discuss any changes in your exercise program with your doctor. · Do not smoke or allow others to smoke around you. If you need help quitting, talk to your doctor about stop-smoking programs and medicines. These can increase your chances of quitting for good. · Talk to your doctor about whether you have any risk factors for sexually transmitted infections (STIs). Having one sex partner (who does not have STIs and does not have sex with anyone else) is a good way to avoid these infections. · Use birth control if you do not want to have children at this time. Talk with your doctor about the choices available and what might be best for you. · Protect your skin from too much sun. When you're outdoors from 10 a.m. to 4 p.m., stay in the shade or cover up with clothing and a hat with a wide brim. Wear sunglasses that block UV rays. Even when it's cloudy, put broad-spectrum sunscreen (SPF 30 or higher) on any exposed skin. · See a dentist one or two times a year for checkups and to have your teeth cleaned. · Wear a seat belt in the car. · Drink alcohol in moderation, if at all. That means no more than 2 drinks a day for men and 1 drink a day for women. Follow your doctor's advice about when to have certain tests. These tests can spot problems early. For everyone · Cholesterol. Have the fat (cholesterol) in your blood tested after age 21. Your doctor will tell you how often to have this done based on your age, family history, or other things that can increase your risk for heart disease. · Blood pressure. Have your blood pressure checked during a routine doctor visit. Your doctor will tell you how often to check your blood pressure based on your age, your blood pressure results, and other factors. · Vision. Talk with your doctor about how often to have a glaucoma test. 
· Diabetes. Ask your doctor whether you should have tests for diabetes. · Colon cancer. Have a test for colon cancer at age 48. You may have one of several tests. If you are younger than 48, you may need a test earlier if you have any risk factors. Risk factors include whether you already had a precancerous polyp removed from your colon or whether your parent, brother, sister, or child has had colon cancer. For women · Breast exam and mammogram. Talk to your doctor about when you should have a clinical breast exam and a mammogram. Medical experts differ on whether and how often women under 50 should have these tests. Your doctor can help you decide what is right for you. · Pap test and pelvic exam. Begin Pap tests at age 24. A Pap test is the best way to find cervical cancer. The test often is part of a pelvic exam. Ask how often to have this test. 
· Tests for sexually transmitted infections (STIs). Ask whether you should have tests for STIs. You may be at risk if you have sex with more than one person, especially if your partners do not wear condoms. For men · Tests for sexually transmitted infections (STIs). Ask whether you should have tests for STIs. You may be at risk if you have sex with more than one person, especially if you do not wear a condom. · Testicular cancer exam. Ask your doctor whether you should check your testicles regularly. · Prostate exam. Talk to your doctor about whether you should have a blood test (called a PSA test) for prostate cancer.  Experts differ on whether and when men should have this test. Some experts suggest it if you are older than 39 and are -American or have a father or brother who got prostate cancer when he was younger than 72. When should you call for help? Watch closely for changes in your health, and be sure to contact your doctor if you have any problems or symptoms that concern you. Where can you learn more? Go to http://roly-sheela.info/. Enter P072 in the search box to learn more about \"Well Visit, Ages 25 to 48: Care Instructions. \" Current as of: May 16, 2017 Content Version: 11.7 © 5231-7225 [employer]. Care instructions adapted under license by [employer] (which disclaims liability or warranty for this information). If you have questions about a medical condition or this instruction, always ask your healthcare professional. [address] any warranty or liability for your use of this information. Introducing hospitals & HEALTH SERVICES! Dear Brandt Sandoval: Thank you for requesting a [employer] account. Our records indicate that you already have an active [employer] account. You can access your account anytime at [email or web address] Did you know that you can access your hospital and ER discharge instructions at any time in [employer]? You can also review all of your test results from your hospital stay or ER visit. Additional Information If you have questions, please visit the Frequently Asked Questions section of the [employer] website at [email or web address]. Remember, [employer] is NOT to be used for urgent needs. For medical emergencies, dial 911. Now available from your iPhone and Android! Please provide this summary of care documentation to your next provider. Your primary care clinician is listed as Pretty Bingham. If you have any questions after today's visit, please call [phone].

## 2018-09-26 NOTE — PROGRESS NOTES
Gaurav Class is a 38 y.o. female Chief Complaint Patient presents with  Complete Physical  
 
1. Have you been to the ER, urgent care clinic since your last visit? Hospitalized since your last visit? No 
 
2. Have you seen or consulted any other health care providers outside of the [address] [place] [address] since your last visit? Include any pap smears or colon screening. No  
 
Visit Vitals  /80 (BP 1 Location: Right arm, BP Patient Position: Sitting)  Pulse 77  Temp 97.9 °F (36.6 °C) (Oral)  Resp 16  
 Ht 5' 3.78\" (1.62 m)  Wt 165 lb (74.8 kg)  SpO2 98%  BMI 28.52 kg/m2 Health Maintenance Due Topic Date Due  
 DTaP/Tdap/Td series (1 - Tdap) 10/09/1994  Influenza Age 5 to Adult  08/01/2018 Patient declines current  immunizations.  
 
Cecilia Torres LPN

## 2018-10-28 LAB
HEMOCCULT STL QL IA: NEGATIVE
SPECIMEN STATUS REPORT, ROLRST: NORMAL

## 2018-11-21 ENCOUNTER — TELEPHONE (OUTPATIENT)
Dept: INTERNAL MEDICINE CLINIC | Age: 43
End: 2018-11-21

## 2018-11-21 ENCOUNTER — OFFICE VISIT (OUTPATIENT)
Dept: INTERNAL MEDICINE CLINIC | Age: 43
End: 2018-11-21

## 2018-11-21 VITALS
HEIGHT: 64 IN | WEIGHT: 159.2 LBS | DIASTOLIC BLOOD PRESSURE: 79 MMHG | OXYGEN SATURATION: 98 % | TEMPERATURE: 97.4 F | SYSTOLIC BLOOD PRESSURE: 123 MMHG | RESPIRATION RATE: 14 BRPM | BODY MASS INDEX: 27.18 KG/M2 | HEART RATE: 64 BPM

## 2018-11-21 DIAGNOSIS — I10 ESSENTIAL HYPERTENSION: Primary | ICD-10-CM

## 2018-11-21 DIAGNOSIS — Z01.811 PRE-OP CHEST EXAM: Primary | ICD-10-CM

## 2018-11-21 DIAGNOSIS — Z01.818 PRE-OP EXAM: ICD-10-CM

## 2018-11-21 DIAGNOSIS — Z01.818 PREOP EXAM FOR INTERNAL MEDICINE: ICD-10-CM

## 2018-11-21 DIAGNOSIS — E78.2 MIXED HYPERLIPIDEMIA: ICD-10-CM

## 2018-11-21 DIAGNOSIS — R73.03 PREDIABETES: ICD-10-CM

## 2018-11-21 NOTE — PROGRESS NOTES
Mari Meng is a 37 y.o. female Chief Complaint Patient presents with  Pre-op Exam  
 
1. Have you been to the ER, urgent care clinic since your last visit? Hospitalized since your last visit? No  
 
2. Have you seen or consulted any other health care providers outside of the [address] [place] [address] since your last visit? Include any pap smears or colon screening. No   
 
Visit Vitals /79 Pulse 64 Temp 97.4 °F (36.3 °C) (Oral) Resp 14 Ht 5' 3.78\" (1.62 m) Wt 159 lb 3.2 oz (72.2 kg) SpO2 98% BMI 27.52 kg/m²

## 2018-11-21 NOTE — TELEPHONE ENCOUNTER
Left detailed message lab orders needed for pre-op. Lab slip will be available at . Can come by office to complete.

## 2018-11-21 NOTE — PROGRESS NOTES
HISTORY OF PRESENT ILLNESS Maria R Nayak is a 37 y.o. female. HPI Cardiovascular Review: 
The patient has hypertension. Diet and Lifestyle: generally follows a low sodium diet, exercises regularly, reduced carbohydrate diet, high sugar/ high carbohydrate diet, Drinks soda/ sweetened beverages frequently Home BP Monitoring: is not measured at home. Pertinent ROS: no TIA's, no chest pain on exertion, no dyspnea on exertion, no swelling of ankles, taking medications as instructed\",no medication side effects noted. Preoperative Evaluation Date of Exam: 2018 Maria R Nayak is a 37 y.o. female (:1975) who presents for preoperative evaluation. Procedure: arthroscopic right shoulder SAD, DCE, and rotator cuff debridement Procedure date: 18 Location: [place] [address] Physician: Chay Mendoza MD 
Latex Allergy: no 
 
Problem List:    
Patient Active Problem List  
 Diagnosis Date Noted  DDD (degenerative disc disease), cervical 10/18/2016  Hypertension  Headache  Arrhythmia  Dense breasts  Sinus congestion Medical History:    
Past Medical History:  
Diagnosis Date  Dense breasts  GERD (gastroesophageal reflux disease)  Headache  Hypertension  Ill-defined condition   
 heart murmer  Nausea & vomiting  Sinus congestion Allergies: Allergies Allergen Reactions  Metronidazole Rash  Stadol [Butorphanol Tartrate] Palpitations  Butorphanol Palpitations Medications:    
Current Outpatient Medications Medication Sig  
 hydroCHLOROthiazide (MICROZIDE) 12.5 mg capsule TAKE ONE CAPSULE BY MOUTH EVERY DAY  ZOLMitriptan (ZOMIG) 2.5 mg spry 1 Spray by Nasal route once as needed for up to 1 dose.  omeprazole (PRILOSEC) 20 mg capsule Take 20 mg by mouth daily. Indications: GASTROESOPHAGEAL REFLUX  multivitamin (ONE A DAY) tablet Take 1 Tab by mouth daily.  Indications: VITAMIN DEFICIENCY PREVENTION  
 No current facility-administered medications for this visit. Surgical History:    
Past Surgical History:  
Procedure Laterality Date Marino Gerard BACK SURGERY  2016  HX BUNIONECTOMY Bilateral   
 HX GYN  2002 D&C  
 HX OTHER SURGICAL  2016  
 sinus  US GUIDE ASP BREAST RT CYST W NDL Right 4 years ago ? Benign Social History:    
Social History Socioeconomic History  Marital status: SINGLE Spouse name: Not on file  Number of children: Not on file  Years of education: Not on file  Highest education level: Not on file Tobacco Use  Smoking status: Never Smoker  Smokeless tobacco: Never Used Substance and Sexual Activity  Alcohol use: Yes Alcohol/week: 0.6 oz Types: 1 Shots of liquor per week  Drug use: No  
 Sexual activity: Yes Birth control/protection: Condom Social History Narrative HABITS:  No alcohol abuse,  Occasional alcohol use. No cigarette or drug abuse.    
    
 SOCIAL HISTORY:  Patient is single. The mother of a daughter 16and 15years old. 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 Master's from [employer].   
    
 FAMILY HISTORY:  Father is 80 with a history of prostate cancer. Mother with diabetes and breast cancer, dyslipidemia and hypertension. Eight brothers and sisters. Patient is a , of twelve years duration. Her  was a principal and . Anesthesia Complications: Nausea History of abnormal bleeding : None History of Blood Transfusions: no 
Health Care Directive or Living Will: yes; Full Code [facility] Visit Vitals /79 Pulse 64 Temp 97.4 °F (36.3 °C) (Oral) Resp 14 Ht 5' 3.78\" (1.62 m) Wt 159 lb 3.2 oz (72.2 kg) SpO2 98% BMI 27.52 kg/m² Physical Exam  
Constitutional: She is oriented to person, place, and time. She appears well-developed and well-nourished. Eyes: Conjunctivae are normal. No scleral icterus. Neck: Normal range of motion. Neck supple. No thyromegaly present. Cardiovascular: Normal rate, regular rhythm and normal heart sounds. Exam reveals no gallop and no friction rub. No murmur heard. Pulmonary/Chest: Effort normal and breath sounds normal. No respiratory distress. She has no wheezes. She has no rales. She exhibits no tenderness. Abdominal: Soft. Bowel sounds are normal. She exhibits no distension. There is no tenderness. There is no rebound and no guarding. Musculoskeletal: Normal range of motion. She exhibits no edema (BLE). Neurological: She is alert and oriented to person, place, and time. Psychiatric: She has a normal mood and affect. DIAGNOSTICS:  
Not indicated ASSESSMENT and PLAN IMPRESSION:  
Humble Gaston is a low risk patient undergoing an intermediate risk procedure. No contraindications to planned surgery Ede Perera MD  
11/21/2018 Diagnoses and all orders for this visit: 1. Essential hypertension- well controlled. Counseled on diet and exercise. Continue current regimen.  
-     LIPID PANEL; Future -     METABOLIC PANEL, COMPREHENSIVE; Future 2. Prediabetes- check prior to next appt. -     HEMOGLOBIN A1C WITH EAG; Future 3. Preop exam for internal medicine- see above assessment. 4. Mixed hyperlipidemia- check lipids prior to next appt. -     LIPID PANEL; Future -     METABOLIC PANEL, COMPREHENSIVE; Future Follow-up Disposition: Appt on file Medication risks/benefits/costs/interactions/alternatives discussed with patient. Humble Gaston  was given an after visit summary which includes diagnoses, current medications, & vitals. she expressed understanding with the diagnosis and plan.

## 2018-11-21 NOTE — PATIENT INSTRUCTIONS
It was a pleasure to see you! As discussed: How to Prepare for Surgery How do you prepare for surgery? Surgery can be stressful. This information will help you understand what you can expect. And it will help you safely prepare for surgery. Follow-up care is a key part of your treatment and safety. Be sure to make and go to all appointments, and call your doctor if you are having problems. It's also a good idea to know your test results and keep a list of the medicines you take. What happens before surgery? 
 Preparing for surgery 
  · Understand exactly what surgery is planned, along with the risks, benefits, and other options. · Tell your doctors ALL the medicines, vitamins, supplements, and herbal remedies you take. Some of these can increase the risk of bleeding or interact with anesthesia.  
  · If you take blood thinners, such as warfarin (Coumadin), clopidogrel (Plavix), or aspirin, be sure to talk to your doctor. He or she will tell you if you should stop taking these medicines before your surgery. Make sure that you understand exactly what your doctor wants you to do.  
  · Your doctor will tell you which medicines to take or stop before your surgery. You may need to stop taking certain medicines a week or more before surgery. So talk to your doctor as soon as you can.  
  · If you have an advance directive, let your doctor know. It may include a living will and a durable power of  for health care. Bring a copy to the hospital. If don't have one, you may want to prepare one. It lets your doctor and loved ones know your health care wishes. Doctors advise that everyone prepare these papers before any type of surgery or procedure. What happens on the day of surgery? 
  · Follow the instructions about when to stop eating and drinking. If you don't, your surgery may be canceled. If your doctor told you to take your medicines on the day of surgery, take them with only a sip of water.   · Take a bath or shower before coming in for your surgery. Do not apply lotions, perfumes, deodorants, or nail polish.  
  · Do not shave the surgical site yourself.  
  · Take off all jewelry and piercings. And take out contact lenses, if you wear them.  
 At the hospital or surgery center · Bring a picture ID.  
  · The area for surgery is often marked to make sure there are no errors.  
  · You will be kept comfortable and safe by your anesthesia provider. The anesthesia may make you sleep. Or it may just numb the area being worked on. Going home · Be sure you have someone to drive you home. Anesthesia and pain medicine make it unsafe for you to drive.  
  · You will be given more specific instructions about recovering from your surgery. They will cover things like diet, wound care, follow-up care, driving, and getting back to your normal routine. When should you call your doctor? · You have questions or concerns.  
  · You don't understand how to prepare for your surgery.  
  · You become ill before the surgery (such as fever, flu, or a cold).  
  · You need to reschedule or have changed your mind about having the surgery. Where can you learn more? Go to http://roly-sheela.info/. Enter Q270 in the search box to learn more about \"How to Prepare for Surgery. \" Current as of: March 29, 2018 Content Version: 11.8 © 0358-1112 Healthwise, Incorporated. Care instructions adapted under license by "[employer]" (which disclaims liability or warranty for this information). If you have questions about a medical condition or this instruction, always ask your healthcare professional. [place] [address] any warranty or liability for your use of this information.

## 2018-11-24 LAB
BUN SERPL-MCNC: 15 MG/DL (ref 6–24)
BUN/CREAT SERPL: 16 (ref 9–23)
CALCIUM SERPL-MCNC: 9 MG/DL (ref 8.7–10.2)
CHLORIDE SERPL-SCNC: 101 MMOL/L (ref 96–106)
CO2 SERPL-SCNC: 26 MMOL/L (ref 20–29)
CREAT SERPL-MCNC: 0.95 MG/DL (ref 0.57–1)
GLUCOSE SERPL-MCNC: 88 MG/DL (ref 65–99)
POTASSIUM SERPL-SCNC: 4.6 MMOL/L (ref 3.5–5.2)
SODIUM SERPL-SCNC: 137 MMOL/L (ref 134–144)

## 2018-11-26 ENCOUNTER — TELEPHONE (OUTPATIENT)
Dept: INTERNAL MEDICINE CLINIC | Age: 43
End: 2018-11-26

## 2018-11-27 NOTE — PROGRESS NOTES
Thanks for completing your colon cancer screening stool test.  
Your results are normal. 
We will recheck in 1 year. Do not hesitate to contact the office if you have any questions or concerns before your next appointment. Kind regards,  
Dr. Archie Gabriel 
---- Lia Iverson MD 
Internal Medicine/ Primary Care Obesity Medicine Specialist  
[facility] [address] Office: [phone] Fax: [phone]

## 2018-11-28 ENCOUNTER — TELEPHONE (OUTPATIENT)
Dept: INTERNAL MEDICINE CLINIC | Age: 43
End: 2018-11-28

## 2018-11-28 NOTE — TELEPHONE ENCOUNTER
----- Message from Paresh Zazueta MD sent at 11/28/2018  3:59 PM EST -----  Labs normal. Okay to submit completed preop forms

## 2018-11-28 NOTE — TELEPHONE ENCOUNTER
Informed patient per provider result note. Faxed BMP and office note dated 11/21/2018 for pre-op to [facility] @ [phone]. Confirmation received.

## 2019-02-21 DIAGNOSIS — E78.2 MIXED HYPERLIPIDEMIA: ICD-10-CM

## 2019-02-21 DIAGNOSIS — R73.03 PREDIABETES: ICD-10-CM

## 2019-02-21 DIAGNOSIS — I10 ESSENTIAL HYPERTENSION: ICD-10-CM

## 2019-03-27 ENCOUNTER — OFFICE VISIT (OUTPATIENT)
Dept: INTERNAL MEDICINE CLINIC | Age: 44
End: 2019-03-27

## 2019-03-27 VITALS
DIASTOLIC BLOOD PRESSURE: 80 MMHG | WEIGHT: 159.8 LBS | OXYGEN SATURATION: 99 % | TEMPERATURE: 97.9 F | HEART RATE: 73 BPM | SYSTOLIC BLOOD PRESSURE: 120 MMHG | HEIGHT: 64 IN | BODY MASS INDEX: 27.28 KG/M2 | RESPIRATION RATE: 16 BRPM

## 2019-03-27 DIAGNOSIS — F41.0 PANIC ATTACK: ICD-10-CM

## 2019-03-27 DIAGNOSIS — I10 ESSENTIAL HYPERTENSION: Primary | ICD-10-CM

## 2019-03-27 DIAGNOSIS — F43.9 STRESS: ICD-10-CM

## 2019-03-27 NOTE — PROGRESS NOTES
Chief Complaint Patient presents with  Hypertension Patient states she is here for her 6 month follow up.

## 2019-03-27 NOTE — PATIENT INSTRUCTIONS
It was a pleasure to see you! As discussed: 
 
Trial of Hydrochlorothiazide Cessation. Check you BP daily. Take one tablet: 
Week 1: Every other days Week 2: Every 2 pdays Week 3: Every 3 days; THEN Stop Check your blood pressure daily. Goal  
Good Control <130/80 Restart medication if  
BP> 140/90 x 3 days Or if >160/90 at any point (check 2 times 5 minutes apart) Call office >160/100 Call office> 180/100 Go to ER> 200/110 Do not take any NSAIDS (Aleve, Ibuprofen, Motrin etc) or decongestants which can raise your blood pressure . Try finding a therapist using the list provided and www. [employer]. [facility] [employer]/ 
Call:  [phone] [facility] Address: [address], [place], [address] [phone] Home Blood Pressure Test: About This Test 
What is it? A home blood pressure test allows you to keep track of your blood pressure at home. Blood pressure is a measure of the force of blood against the walls of your arteries. Blood pressure readings include two numbers, such as 130/80 (say \"130 over 80\"). The first number is the systolic pressure. The second number is the diastolic pressure. Why is this test done? You may do this test at home to: · Find out if you have high blood pressure. · Track your blood pressure if you have high blood pressure. · Track how well medicine is working to reduce high blood pressure. · Check how lifestyle changes, such as weight loss and exercise, are affecting blood pressure. How can you prepare for the test? 
· Do not use caffeine, tobacco, or medicines known to raise blood pressure (such as nasal decongestant sprays) for at least 30 minutes before taking your blood pressure. · Do not exercise for at least 30 minutes before taking your blood pressure. What happens before the test? 
Take your blood pressure while you feel comfortable and relaxed.  Sit quietly with both feet on the floor for at least 5 minutes before the test. 
What happens during the test? 
· Sit with your arm slightly bent and resting on a table so that your upper arm is at the same level as your heart. · Roll up your sleeve or take off your shirt to expose your upper arm. · Wrap the blood pressure cuff around your upper arm so that the lower edge of the cuff is about 1 inch above the bend of your elbow. Proceed with the following steps depending on if you are using an automatic or manual pressure monitor. Automatic blood pressure monitors · Press the on/off button on the automatic monitor and wait until the ready-to-measure \"heart\" symbol appears next to zero in the display window. · Press the start button. The cuff will inflate and deflate by itself. · Your blood pressure numbers will appear on the screen. · Write your numbers in your log book, along with the date and time. Manual blood pressure monitors · Place the earpieces of a stethoscope in your ears, and place the bell of the stethoscope over the artery, just below the cuff. · Close the valve on the rubber inflating bulb. · Squeeze the bulb rapidly with your opposite hand to inflate the cuff until the dial or column of mercury reads about 30 mm Hg higher than your usual systolic pressure. If you do not know your usual pressure, inflate the cuff to 210 mm Hg or until the pulse at your wrist disappears. · Open the pressure valve just slightly by twisting or pressing the valve on the bulb. · As you watch the pressure slowly fall, note the level on the dial at which you first start to hear a pulsing or tapping sound through the stethoscope. This is your systolic blood pressure. · Continue letting the air out slowly. The sounds will become muffled and will finally disappear. Note the pressure when the sounds completely disappear. This is your diastolic blood pressure. Let out all the remaining air. · Write your numbers in your log book, along with the date and time. What else should you know about the test? 
It is more accurate to take the average of several readings made throughout the day than to rely on a single reading. It's normal for blood pressure to go up and down throughout the day. Follow-up care is a key part of your treatment and safety. Be sure to make and go to all appointments, and call your doctor if you are having problems. It's also a good idea to keep a list of the medicines you take. Where can you learn more? Go to http://roly-sheela.info/. Enter C427 in the search box to learn more about \"Home Blood Pressure Test: About This Test.\" Current as of: July 22, 2018 Content Version: 11.9 © 0195-0886 [employer]. Care instructions adapted under license by [employer] (which disclaims liability or warranty for this information). If you have questions about a medical condition or this instruction, always ask your healthcare professional. [address] any warranty or liability for your use of this information.

## 2019-03-27 NOTE — PROGRESS NOTES
HISTORY OF PRESENT ILLNESS Little Frankel is a 37 y.o. female. HPI Cardiovascular Review: 
The patient has hypertension and Body mass index is 27.62 kg/m². Wolf Early Diet and Lifestyle: exercises regularly, nonsmoker, alcohol intake <7 drinks/ week, reduced carbohydrate diet Stress: Moderate. Planning to find a new stress reliever. Home BP Monitoring: is not measured at home. Pertinent ROS: taking medications as instructed, no medication side effects noted, no TIA's, no chest pain on exertion, no dyspnea on exertion, no swelling of ankles. Stress Patient is seen for evaluation of stress. She has never been treated Self Care Inventory Sleep: <6 hrs/ night Eating Habits: Clean, low sugar Support: Glenroy Vargas Spiritual Life: Important, strong spiritual life Exercise: >2.5hr/week 
she denies suicidal thoughts with specific plan. 3 most recent PHQ Screens 3/27/2019 Little interest or pleasure in doing things Not at all Feeling down, depressed, irritable, or hopeless Not at all Total Score PHQ 2 0 ROSper HPI Patient Active Problem List  
 Diagnosis Date Noted  DDD (degenerative disc disease), cervical 10/18/2016  Hypertension  Headache  Arrhythmia  Dense breasts  Sinus congestion Current Outpatient Medications Medication Sig Dispense Refill  hydroCHLOROthiazide (MICROZIDE) 12.5 mg capsule TAKE ONE CAPSULE BY MOUTH EVERY DAY 90 Cap 2  
 ZOLMitriptan (ZOMIG) 2.5 mg spry 1 Spray by Nasal route once as needed for up to 1 dose. 4 Each 3  
 omeprazole (PRILOSEC) 20 mg capsule Take 20 mg by mouth daily. Indications: GASTROESOPHAGEAL REFLUX  multivitamin (ONE A DAY) tablet Take 1 Tab by mouth daily. Indications: VITAMIN DEFICIENCY PREVENTION Allergies Allergen Reactions  Metronidazole Rash  Stadol [Butorphanol Tartrate] Palpitations  Butorphanol Palpitations Visit Vitals /80 (BP 1 Location: Left arm, BP Patient Position: Sitting) Pulse 73 Temp 97.9 °F (36.6 °C) (Oral) Resp 16 Ht 5' 3.78\" (1.62 m) Wt 159 lb 12.8 oz (72.5 kg) SpO2 99% BMI 27.62 kg/m² Physical Exam  
Constitutional: She is oriented to person, place, and time. She appears well-developed. No distress. Eyes: Conjunctivae are normal.  
Neck: Neck supple. Cardiovascular: Normal rate, regular rhythm and normal heart sounds. Pulmonary/Chest: Effort normal and breath sounds normal. No respiratory distress. She has no wheezes. She has no rales. She exhibits no tenderness. Neurological: She is alert and oriented to person, place, and time. Skin: Skin is warm. Psychiatric: She has a normal mood and affect. ASSESSMENT and PLAN Diagnoses and all orders for this visit: 1. Essential hypertension- Russ Luque would like to try to wean medication. Reasonable given her diet and exercise regimen have been optimized. We discussed the following. Trial of Hydrochlorothiazide Cessation. Check you BP daily. Take one tablet: 
Week 1: Every other days Week 2: Every 2 pdays Week 3: Every 3 days; THEN Stop Check your blood pressure daily. Goal  
Good Control <130/80 Restart medication if  
BP> 140/90 x 3 days Or if >160/90 at any point (check 2 times 5 minutes apart) Call office >160/100 Call office> 180/100 Go to ER> 200/110 Do not take any NSAIDS (Aleve, Ibuprofen, Motrin etc) or decongestants which can raise your blood pressure . 2. Panic attack- discussed treatment strategies. Russ Luque will consider seeing a therapist.  Check for secondary causes. -     TSH 3RD GENERATION 
-     T4, FREE 3. Stress- see above. Follow-up and Dispositions · Return in about 6 weeks (around 5/8/2019). Medication risks/benefits/costs/interactions/alternatives discussed with patient. Russ Luque  was given an after visit summary which includes diagnoses, current medications, & vitals. she expressed understanding with the diagnosis and plan. 20 minutes of 25 minutes of care coordination was spent counseling patient on treatment plan and assessing understanding

## 2019-05-06 ENCOUNTER — OFFICE VISIT (OUTPATIENT)
Dept: INTERNAL MEDICINE CLINIC | Age: 44
End: 2019-05-06

## 2019-05-06 VITALS
DIASTOLIC BLOOD PRESSURE: 90 MMHG | HEIGHT: 63 IN | HEART RATE: 59 BPM | BODY MASS INDEX: 28.81 KG/M2 | RESPIRATION RATE: 14 BRPM | TEMPERATURE: 98 F | WEIGHT: 162.6 LBS | SYSTOLIC BLOOD PRESSURE: 132 MMHG

## 2019-05-06 DIAGNOSIS — F43.9 STRESS: ICD-10-CM

## 2019-05-06 DIAGNOSIS — J30.9 ALLERGIC SINUSITIS: ICD-10-CM

## 2019-05-06 DIAGNOSIS — G43.109 MIGRAINE WITH AURA AND WITHOUT STATUS MIGRAINOSUS, NOT INTRACTABLE: ICD-10-CM

## 2019-05-06 DIAGNOSIS — G47.09 OTHER INSOMNIA: ICD-10-CM

## 2019-05-06 DIAGNOSIS — I10 ESSENTIAL HYPERTENSION: Primary | ICD-10-CM

## 2019-05-06 RX ORDER — HYDROCHLOROTHIAZIDE 12.5 MG/1
CAPSULE ORAL
Qty: 90 CAP | Refills: 1 | Status: SHIPPED | OUTPATIENT
Start: 2019-05-06 | End: 2022-03-04

## 2019-05-06 RX ORDER — HYDROXYZINE 25 MG/1
25 TABLET, FILM COATED ORAL
Qty: 30 TAB | Refills: 1 | Status: SHIPPED | OUTPATIENT
Start: 2019-05-06 | End: 2019-11-01

## 2019-05-06 RX ORDER — ONDANSETRON 4 MG/1
4 TABLET, FILM COATED ORAL
Qty: 20 TAB | Refills: 0 | Status: SHIPPED | OUTPATIENT
Start: 2019-05-06 | End: 2019-11-01

## 2019-05-06 NOTE — PATIENT INSTRUCTIONS
It was a pleasure to see you! As discussed: It has been a pleasure caring for you! For your next appointment:  
Schedule with Dr. Gertrudis Multani [facility] [phone] Chi Ibanez MD 
Address: [place], [address] Phone: [phone] Allergic sinusitis. Contributing  
-Use nasal saline spray 3-4 times/day  
-Use Atarax Migraine Headache: Care Instructions Your Care Instructions Migraines are painful, throbbing headaches that often start on one side of the head. They may cause nausea and vomiting and make you sensitive to light, sound, or smell. Without treatment, migraines can last from 4 hours to a few days. Medicines can help prevent migraines or stop them after they have started. Your doctor can help you find which ones work best for you. Follow-up care is a key part of your treatment and safety. Be sure to make and go to all appointments, and call your doctor if you are having problems. It's also a good idea to know your test results and keep a list of the medicines you take. How can you care for yourself at home? · Do not drive if you have taken a prescription pain medicine. · Rest in a quiet, dark room until your headache is gone. Close your eyes, and try to relax or go to sleep. Don't watch TV or read. · Put a cold, moist cloth or cold pack on the painful area for 10 to 20 minutes at a time. Put a thin cloth between the cold pack and your skin. · Use a warm, moist towel or a heating pad set on low to relax tight shoulder and neck muscles. · Have someone gently massage your neck and shoulders. · Take your medicines exactly as prescribed. Call your doctor if you think you are having a problem with your medicine. You will get more details on the specific medicines your doctor prescribes. · Be careful not to take pain medicine more often than the instructions allow.  You could get worse or more frequent headaches when the medicine wears off. To prevent migraines · Keep a headache diary so you can figure out what triggers your headaches. Avoiding triggers may help you prevent headaches. Record when each headache began, how long it lasted, and what the pain was like. (Was it throbbing, aching, stabbing, or dull?) Write down any other symptoms you had with the headache, such as nausea, flashing lights or dark spots, or sensitivity to bright light or loud noise. Note if the headache occurred near your period. List anything that might have triggered the headache. Triggers may include certain foods (chocolate, cheese, wine) or odors, smoke, bright light, stress, or lack of sleep. · If your doctor has prescribed medicine for your migraines, take it as directed. You may have medicine that you take only when you get a migraine and medicine that you take all the time to help prevent migraines. ? If your doctor has prescribed medicine for when you get a headache, take it at the first sign of a migraine, unless your doctor has given you other instructions. ? If your doctor has prescribed medicine to prevent migraines, take it exactly as prescribed. Call your doctor if you think you are having a problem with your medicine. · Find healthy ways to deal with stress. Migraines are most common during or right after stressful times. Take time to relax before and after you do something that has caused a migraine in the past. 
· Try to keep your muscles relaxed by keeping good posture. Check your jaw, face, neck, and shoulder muscles for tension. Try to relax them. When you sit at a desk, change positions often. And make sure to stretch for 30 seconds each hour. · Get plenty of sleep and exercise. · Eat meals on a regular schedule. Avoid foods and drinks that often trigger migraines.  These include chocolate, alcohol (especially red wine and port), aspartame, monosodium glutamate (MSG), and some additives found in foods (such as hot dogs, merchant, cold cuts, aged cheeses, and pickled foods). · Limit caffeine. Don't drink too much coffee, tea, or soda. But don't quit caffeine suddenly. That can also give you migraines. · Do not smoke or allow others to smoke around you. If you need help quitting, talk to your doctor about stop-smoking programs and medicines. These can increase your chances of quitting for good. · If you are taking birth control pills or hormone therapy, talk to your doctor about whether they are triggering your migraines. When should you call for help? Call 911 anytime you think you may need emergency care. For example, call if: 
  · You have signs of a stroke. These may include: 
? Sudden numbness, paralysis, or weakness in your face, arm, or leg, especially on only one side of your body. ? Sudden vision changes. ? Sudden trouble speaking. ? Sudden confusion or trouble understanding simple statements. ? Sudden problems with walking or balance. ? A sudden, severe headache that is different from past headaches.  
 Call your doctor now or seek immediate medical care if: 
  · You have new or worse nausea and vomiting.  
  · You have a new or higher fever.  
  · Your headache gets much worse.  
 Watch closely for changes in your health, and be sure to contact your doctor if: 
  · You are not getting better after 2 days (48 hours). Where can you learn more? Go to http://roly-sheela.info/. Enter I594 in the search box to learn more about \"Migraine Headache: Care Instructions. \" Current as of: Ayesha 3, 2018 Content Version: 11.9 © 1018-9441 [employer]. Care instructions adapted under license by [employer] (which disclaims liability or warranty for this information).  If you have questions about a medical condition or this instruction, always ask your healthcare professional. [address] any warranty or liability for your use of this information.

## 2019-05-06 NOTE — PROGRESS NOTES
HISTORY OF PRESENT ILLNESS Adrienne Miranda is a 37 y.o. female. HPI Cardiovascular Review: 
The patient has hypertension. Diet and Lifestyle: generally follows a low sodium diet, exercises sporadically, nonsmoker Home BP Monitoring: is well controlled at home, ranging 120's/70s. Pertinent ROS: no TIA's, no chest pain on exertion, no dyspnea on exertion, no swelling of ankles, taking medications as instructed\",no medication side effects noted. Migraines Adrienne Miranda reports that her migraine frequency has increased in the past month. +H/o migraines sinus induced typically. +nausea with migraines. +Aura- scomata and photophobia. S/p sinus surgery 2017 which helps Uses Zomig rarely used. Typically has use Excedrin migraine 2x/ day. Sleep< 7hrs/ night- racing Eating Habits:Healthy Exercise: <2.5hrs/ day. ROSper HPI Patient Active Problem List  
 Diagnosis Date Noted  DDD (degenerative disc disease), cervical 10/18/2016  Hypertension  Headache  Arrhythmia  Dense breasts  Sinus congestion Current Outpatient Medications Medication Sig Dispense Refill  hydroCHLOROthiazide (MICROZIDE) 12.5 mg capsule TAKE ONE CAPSULE BY MOUTH EVERY DAY (Patient taking differently: TAKE ONE CAPSULE BY MOUTH EVERY DAY  Indications: PT takes 1 capsule po every other day) 90 Cap 2  
 ZOLMitriptan (ZOMIG) 2.5 mg spry 1 Spray by Nasal route once as needed for up to 1 dose. 4 Each 3  
 omeprazole (PRILOSEC) 20 mg capsule Take 20 mg by mouth daily. Indications: GASTROESOPHAGEAL REFLUX  multivitamin (ONE A DAY) tablet Take 1 Tab by mouth daily. Indications: VITAMIN DEFICIENCY PREVENTION Allergies Allergen Reactions  Metronidazole Rash  Stadol [Butorphanol Tartrate] Palpitations  Butorphanol Palpitations Visit Vitals /90 (BP 1 Location: Left arm, BP Patient Position: Sitting) Pulse (!) 59 Temp 98 °F (36.7 °C) (Oral) Resp 14 Ht 5' 3\" (1.6 m) Wt 162 lb 9.6 oz (73.8 kg) BMI 28.80 kg/m² Physical Exam  
Constitutional: She appears well-developed. No distress. HENT:  
Nose: Mucosal edema present. No rhinorrhea, sinus tenderness or septal deviation. Right sinus exhibits maxillary sinus tenderness and frontal sinus tenderness. Left sinus exhibits maxillary sinus tenderness and frontal sinus tenderness. Mouth/Throat: Uvula is midline and mucous membranes are normal. Posterior oropharyngeal erythema present. No oropharyngeal exudate or posterior oropharyngeal edema. Eyes: Conjunctivae are normal.  
Cardiovascular: Normal rate, regular rhythm and normal heart sounds. Pulmonary/Chest: Effort normal and breath sounds normal. No respiratory distress. She has no wheezes. She has no rales. She exhibits no tenderness. ASSESSMENT and PLAN Diagnoses and all orders for this visit: 1. Essential hypertension- bp mildly elevated due to stress and HA. Lower at home. Continue current regimen. Optimize symptomatic relief. -     hydroCHLOROthiazide (MICROZIDE) 12.5 mg capsule; TAKE ONE CAPSULE BY MOUTH EVERY DAY 
 
2. Stress- work on regular exercise and sleep optimizarion.  
-     hydrOXYzine HCl (ATARAX) 25 mg tablet; Take 1 Tab by mouth nightly. (2 hours before bedtime) 3. Migraine with aura and without status migrainosus, not intractable- daily HA due to allergic sinusitis. No red flags. See neurology given history. Red flags to warrant ER or earlier clinical evaluation reviewed. -     ondansetron hcl (ZOFRAN) 4 mg tablet; Take 1 Tab by mouth every eight (8) hours as needed for Nausea. -     REFERRAL TO NEUROLOGY 4. Other insomnia- see above -     hydrOXYzine HCl (ATARAX) 25 mg tablet; Take 1 Tab by mouth nightly. (2 hours before bedtime) 5. Allergic sinusitis- optimize sx with nasal saline use. Medication risks/benefits/costs/interactions/alternatives discussed with patient. Issac Toussaint  was given an after visit summary which includes diagnoses, current medications, & vitals. she expressed understanding with the diagnosis and plan.

## 2019-05-06 NOTE — PROGRESS NOTES
Identified pt with two pt identifiers(name and ). Reviewed record in preparation for visit and have obtained necessary documentation. All patient medications has been reviewed. Chief Complaint Patient presents with  Follow-up Blood Pressure and Migraines Health Maintenance Due Topic  BREAST CANCER SCRN MAMMOGRAM   
 
Mammo: done within a year Vitals:  
 19 0845 BP: 132/90 Pulse: (!) 59 Resp: 14 Temp: 98 °F (36.7 °C) TempSrc: Oral  
Weight: 162 lb 9.6 oz (73.8 kg) Height: 5' 3\" (1.6 m) PainSc:   0 - No pain Coordination of Care Questionnaire:  
1) Have you been to an emergency room, urgent care, or hospitalized since your last visit?   no    
 
2. Have seen or consulted any other health care provider since your last visit? NO 
 
3) Do you have an Advanced Directive/ Living Will in place? YES If yes, do we have a copy on file NO If no, would you like information NO Patient is accompanied by self I have received verbal consent from Yisel Ornelas to discuss any/all medical information while they are present in the room.

## 2019-06-02 LAB
ALBUMIN SERPL-MCNC: 4 G/DL (ref 3.5–5.5)
ALBUMIN/GLOB SERPL: 1.6 {RATIO} (ref 1.2–2.2)
ALP SERPL-CCNC: 46 IU/L (ref 39–117)
ALT SERPL-CCNC: 10 IU/L (ref 0–32)
AST SERPL-CCNC: 11 IU/L (ref 0–40)
BILIRUB SERPL-MCNC: <0.2 MG/DL (ref 0–1.2)
BUN SERPL-MCNC: 20 MG/DL (ref 6–24)
BUN/CREAT SERPL: 19 (ref 9–23)
CALCIUM SERPL-MCNC: 8.9 MG/DL (ref 8.7–10.2)
CHLORIDE SERPL-SCNC: 102 MMOL/L (ref 96–106)
CHOLEST SERPL-MCNC: 200 MG/DL (ref 100–199)
CO2 SERPL-SCNC: 24 MMOL/L (ref 20–29)
CREAT SERPL-MCNC: 1.05 MG/DL (ref 0.57–1)
EST. AVERAGE GLUCOSE BLD GHB EST-MCNC: 114 MG/DL
GLOBULIN SER CALC-MCNC: 2.5 G/DL (ref 1.5–4.5)
GLUCOSE SERPL-MCNC: 89 MG/DL (ref 65–99)
HBA1C MFR BLD: 5.6 % (ref 4.8–5.6)
HDLC SERPL-MCNC: 60 MG/DL
LDLC SERPL CALC-MCNC: 122 MG/DL (ref 0–99)
POTASSIUM SERPL-SCNC: 4.3 MMOL/L (ref 3.5–5.2)
PROT SERPL-MCNC: 6.5 G/DL (ref 6–8.5)
SODIUM SERPL-SCNC: 140 MMOL/L (ref 134–144)
T4 FREE SERPL-MCNC: 0.93 NG/DL (ref 0.82–1.77)
TRIGL SERPL-MCNC: 90 MG/DL (ref 0–149)
TSH SERPL DL<=0.005 MIU/L-ACNC: 3 UIU/ML (ref 0.45–4.5)
VLDLC SERPL CALC-MCNC: 18 MG/DL (ref 5–40)

## 2019-06-03 NOTE — PROGRESS NOTES
Dear Armando Peters   Thank you for completing your labs. Please find your most recent results below. Your results are clinically normal/ stable. Your cholesterol has improved and you are no longer prediabetic! No medication changes are needed. Lab Review  Some labs that may have been tested and their explanation are:  Your electrolytes, kidney & liver function (Metabolic Panel)   Anemia, blood cells (CBC)  Thyroid (TSH + T4, T3)  Hormones (prolactin, vitamin D )   Pregnancy (Beta HCG)    Diabetes (Hemoglobin A1c)   PSA (Prostate Health)  Lipid Panel (Cholesterol, HDL \"good\", LDL \"bad\")     Do not hesitate to contact the office if you have any questions or concerns before your next appointment.      Kind regards,   Dr. Wagner Fong         ----  Fredo Loza MD  Internal Medicine/ [facility] [address] Internal Medicine   [place] [address], [address] [address]  Office: [phone]  Fax: [phone]

## 2019-06-18 NOTE — PROGRESS NOTES
Hi Ms. Yvan Liu,  Your thyroid studies are normal.  It has been a privilege and a pleasure caring for you. All the best I your health journey!   Warm regards,  Dr. [place] Turcios

## 2019-08-01 ENCOUNTER — OFFICE VISIT (OUTPATIENT)
Dept: NEUROLOGY | Age: 44
End: 2019-08-01

## 2019-08-01 VITALS
SYSTOLIC BLOOD PRESSURE: 110 MMHG | BODY MASS INDEX: 28.35 KG/M2 | HEIGHT: 63 IN | OXYGEN SATURATION: 100 % | WEIGHT: 160 LBS | RESPIRATION RATE: 16 BRPM | HEART RATE: 73 BPM | DIASTOLIC BLOOD PRESSURE: 80 MMHG

## 2019-08-01 DIAGNOSIS — G43.709 CHRONIC MIGRAINE W/O AURA W/O STATUS MIGRAINOSUS, NOT INTRACTABLE: Primary | ICD-10-CM

## 2019-08-01 RX ORDER — AMITRIPTYLINE HYDROCHLORIDE 10 MG/1
TABLET, FILM COATED ORAL
Qty: 60 TAB | Refills: 4 | Status: SHIPPED | OUTPATIENT
Start: 2019-08-01 | End: 2019-11-01

## 2019-08-01 RX ORDER — LANOLIN ALCOHOL/MO/W.PET/CERES
400 CREAM (GRAM) TOPICAL
Qty: 90 TAB | Refills: 1 | Status: SHIPPED | OUTPATIENT
Start: 2019-08-01 | End: 2019-12-09

## 2019-08-01 NOTE — PROGRESS NOTES
Chief Complaint   Patient presents with    Headache       HPI    Mrs. Ирина Dunn is a 40-year-old woman here to follow-up on chronic migraine. When I saw her last year she was doing very well with 1 or 2 severe headaches per month. Since then the headaches have increased and into frequency particularly with the increase in stress at her work. She works for the school system. She is having daily migrainous symptoms now sometimes escalating to severe throbbing pounding headaches requiring some a triptan or hydrocodone. Nausea sometimes. Sleep has been affected. Review of Systems   Eyes: Positive for photophobia. Negative for double vision. Gastrointestinal: Positive for nausea. Neurological: Positive for headaches. Psychiatric/Behavioral: The patient has insomnia. All other systems reviewed and are negative. Past Medical History:   Diagnosis Date    Dense breasts     GERD (gastroesophageal reflux disease)     Headache     Hypertension     Ill-defined condition     heart murmer    Nausea & vomiting     Sinus congestion      Family History   Problem Relation Age of Onset    Breast Cancer Mother 79    Diabetes Mother     Hypertension Mother     Stroke Mother     Cancer Father         Prostate    Hypertension Father     Hypertension Sister     Hypertension Brother      Social History     Socioeconomic History    Marital status: SINGLE     Spouse name: Not on file    Number of children: Not on file    Years of education: Not on file    Highest education level: Not on file   Occupational History    Not on file   Social Needs    Financial resource strain: Not on file    Food insecurity:     Worry: Not on file     Inability: Not on file    Transportation needs:     Medical: Not on file     Non-medical: Not on file   Tobacco Use    Smoking status: Never Smoker    Smokeless tobacco: Never Used   Substance and Sexual Activity    Alcohol use:  Yes     Alcohol/week: 1.0 standard drinks     Types: 1 Glasses of wine per week    Drug use: No    Sexual activity: Yes     Birth control/protection: Condom   Lifestyle    Physical activity:     Days per week: Not on file     Minutes per session: Not on file    Stress: Not on file   Relationships    Social connections:     Talks on phone: Not on file     Gets together: Not on file     Attends Judaism service: Not on file     Active member of club or organization: Not on file     Attends meetings of clubs or organizations: Not on file     Relationship status: Not on file    Intimate partner violence:     Fear of current or ex partner: Not on file     Emotionally abused: Not on file     Physically abused: Not on file     Forced sexual activity: Not on file   Other Topics Concern    Not on file   Social History Narrative    HABITS:  No alcohol abuse,  Occasional alcohol use. No cigarette or drug abuse.           SOCIAL HISTORY:  Patient is single. The mother of a daughter 16and 15years old. 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 Master's from [employer].          FAMILY HISTORY:  Father is 80 with a history of prostate cancer. Mother with diabetes and breast cancer, dyslipidemia and hypertension. Eight brothers and sisters. Patient is a , of twelve years duration. Her  was a principal and . Allergies   Allergen Reactions    Metronidazole Rash    Stadol [Butorphanol Tartrate] Palpitations    Butorphanol Palpitations         Current Outpatient Medications   Medication Sig    amitriptyline (ELAVIL) 10 mg tablet 1 tablet at bedtime. Increase to 2 tablets after 2 weeks if no improvement.  magnesium oxide (MAG-OX) 400 mg tablet Take 1 Tab by mouth nightly.     hydroCHLOROthiazide (MICROZIDE) 12.5 mg capsule TAKE ONE CAPSULE BY MOUTH EVERY DAY    ondansetron hcl (ZOFRAN) 4 mg tablet Take 1 Tab by mouth every eight (8) hours as needed for Nausea.  hydrOXYzine HCl (ATARAX) 25 mg tablet Take 1 Tab by mouth nightly. (2 hours before bedtime)    ZOLMitriptan (ZOMIG) 2.5 mg spry 1 Spray by Nasal route once as needed for up to 1 dose.  omeprazole (PRILOSEC) 20 mg capsule Take 20 mg by mouth daily. Indications: GASTROESOPHAGEAL REFLUX    multivitamin (ONE A DAY) tablet Take 1 Tab by mouth daily. Indications: VITAMIN DEFICIENCY PREVENTION     No current facility-administered medications for this visit. Neurologic Exam     Mental Status   WD/WN adult in NAD, normal grooming  VSS  A&O x 3    PERRL, nonicteric  Face is symmetric, tongue midline  Speech is fluent and clear  No limb ataxia. No abnl movements. Moving all extemities spontaneously and symmetric  Normal gait    CVS RRR  Lungs nonlabored  Skin is warm and dry         Visit Vitals  /80 (BP 1 Location: Left arm, BP Patient Position: Sitting)   Pulse 73   Resp 16   Ht 5' 3\" (1.6 m)   Wt 72.6 kg (160 lb)   SpO2 100%   BMI 28.34 kg/m²       Assessment and Plan   Diagnoses and all orders for this visit:    1. Chronic migraine w/o aura w/o status migrainosus, not intractable    Other orders  -     amitriptyline (ELAVIL) 10 mg tablet; 1 tablet at bedtime. Increase to 2 tablets after 2 weeks if no improvement.  -     magnesium oxide (MAG-OX) 400 mg tablet; Take 1 Tab by mouth nightly. 49-year-old woman who has chronic mild headaches now increasing in frequency since last year probably due to increasing stress. She also has a lot of concomitant sinus issues. At this juncture I would like a rechallenge with amitriptyline and add magnesium nightly. Side effects discussed. Sleep optimization. Keep a headache log. I will see her at her next visit.         [address] [facility], [address]  Diplomate ALYSHA

## 2019-08-21 DIAGNOSIS — G47.09 OTHER INSOMNIA: ICD-10-CM

## 2019-08-21 DIAGNOSIS — F43.9 STRESS: ICD-10-CM

## 2019-08-21 RX ORDER — HYDROXYZINE 25 MG/1
25 TABLET, FILM COATED ORAL
Qty: 30 TAB | Refills: 1 | OUTPATIENT
Start: 2019-08-21

## 2019-11-01 ENCOUNTER — HOSPITAL ENCOUNTER (INPATIENT)
Age: 44
LOS: 2 days | Discharge: HOME | DRG: 472 | End: 2019-11-05
Attending: EMERGENCY MEDICINE | Admitting: INTERNAL MEDICINE
Payer: COMMERCIAL

## 2019-11-01 ENCOUNTER — APPOINTMENT (OUTPATIENT)
Dept: CT IMAGING | Age: 44
DRG: 472 | End: 2019-11-01
Attending: STUDENT IN AN ORGANIZED HEALTH CARE EDUCATION/TRAINING PROGRAM
Payer: COMMERCIAL

## 2019-11-01 ENCOUNTER — APPOINTMENT (OUTPATIENT)
Dept: MRI IMAGING | Age: 44
DRG: 472 | End: 2019-11-01
Attending: EMERGENCY MEDICINE
Payer: COMMERCIAL

## 2019-11-01 DIAGNOSIS — G95.9 MYELOPATHY (HCC): ICD-10-CM

## 2019-11-01 DIAGNOSIS — M50.20 CERVICAL DISC HERNIATION: ICD-10-CM

## 2019-11-01 DIAGNOSIS — R53.1 WEAKNESS: Primary | ICD-10-CM

## 2019-11-01 LAB
ALBUMIN SERPL-MCNC: 3.8 G/DL (ref 3.5–5)
ALBUMIN/GLOB SERPL: 0.9 {RATIO} (ref 1.1–2.2)
ALP SERPL-CCNC: 48 U/L (ref 45–117)
ALT SERPL-CCNC: 15 U/L (ref 12–78)
ANION GAP SERPL CALC-SCNC: 6 MMOL/L (ref 5–15)
AST SERPL-CCNC: 10 U/L (ref 15–37)
BASOPHILS # BLD: 0 K/UL (ref 0–0.1)
BASOPHILS NFR BLD: 0 % (ref 0–1)
BILIRUB SERPL-MCNC: 0.2 MG/DL (ref 0.2–1)
BUN SERPL-MCNC: 14 MG/DL (ref 6–20)
BUN/CREAT SERPL: 13 (ref 12–20)
CALCIUM SERPL-MCNC: 9.3 MG/DL (ref 8.5–10.1)
CHLORIDE SERPL-SCNC: 106 MMOL/L (ref 97–108)
CO2 SERPL-SCNC: 26 MMOL/L (ref 21–32)
COMMENT, HOLDF: NORMAL
CREAT SERPL-MCNC: 1.08 MG/DL (ref 0.55–1.02)
DIFFERENTIAL METHOD BLD: ABNORMAL
EOSINOPHIL # BLD: 0 K/UL (ref 0–0.4)
EOSINOPHIL NFR BLD: 0 % (ref 0–7)
ERYTHROCYTE [DISTWIDTH] IN BLOOD BY AUTOMATED COUNT: 14.4 % (ref 11.5–14.5)
GLOBULIN SER CALC-MCNC: 4.1 G/DL (ref 2–4)
GLUCOSE BLD STRIP.AUTO-MCNC: 99 MG/DL (ref 65–100)
GLUCOSE SERPL-MCNC: 116 MG/DL (ref 65–100)
HCT VFR BLD AUTO: 39.7 % (ref 35–47)
HGB BLD-MCNC: 12.7 G/DL (ref 11.5–16)
IMM GRANULOCYTES # BLD AUTO: 0.1 K/UL (ref 0–0.04)
IMM GRANULOCYTES NFR BLD AUTO: 1 % (ref 0–0.5)
LYMPHOCYTES # BLD: 1.1 K/UL (ref 0.8–3.5)
LYMPHOCYTES NFR BLD: 8 % (ref 12–49)
MCH RBC QN AUTO: 28 PG (ref 26–34)
MCHC RBC AUTO-ENTMCNC: 32 G/DL (ref 30–36.5)
MCV RBC AUTO: 87.4 FL (ref 80–99)
MONOCYTES # BLD: 0.2 K/UL (ref 0–1)
MONOCYTES NFR BLD: 2 % (ref 5–13)
NEUTS SEG # BLD: 12.6 K/UL (ref 1.8–8)
NEUTS SEG NFR BLD: 89 % (ref 32–75)
NRBC # BLD: 0 K/UL (ref 0–0.01)
NRBC BLD-RTO: 0 PER 100 WBC
PLATELET # BLD AUTO: 280 K/UL (ref 150–400)
PMV BLD AUTO: 11 FL (ref 8.9–12.9)
POTASSIUM SERPL-SCNC: 4.1 MMOL/L (ref 3.5–5.1)
PROT SERPL-MCNC: 7.9 G/DL (ref 6.4–8.2)
RBC # BLD AUTO: 4.54 M/UL (ref 3.8–5.2)
SAMPLES BEING HELD,HOLD: NORMAL
SERVICE CMNT-IMP: NORMAL
SODIUM SERPL-SCNC: 138 MMOL/L (ref 136–145)
WBC # BLD AUTO: 14 K/UL (ref 3.6–11)

## 2019-11-01 PROCEDURE — 70553 MRI BRAIN STEM W/O & W/DYE: CPT

## 2019-11-01 PROCEDURE — 93005 ELECTROCARDIOGRAM TRACING: CPT

## 2019-11-01 PROCEDURE — 74011250636 HC RX REV CODE- 250/636: Performed by: EMERGENCY MEDICINE

## 2019-11-01 PROCEDURE — 36415 COLL VENOUS BLD VENIPUNCTURE: CPT

## 2019-11-01 PROCEDURE — 85025 COMPLETE CBC W/AUTO DIFF WBC: CPT

## 2019-11-01 PROCEDURE — 72156 MRI NECK SPINE W/O & W/DYE: CPT

## 2019-11-01 PROCEDURE — A9575 INJ GADOTERATE MEGLUMI 0.1ML: HCPCS | Performed by: EMERGENCY MEDICINE

## 2019-11-01 PROCEDURE — 70450 CT HEAD/BRAIN W/O DYE: CPT

## 2019-11-01 PROCEDURE — 74011250636 HC RX REV CODE- 250/636: Performed by: FAMILY MEDICINE

## 2019-11-01 PROCEDURE — 99218 HC RM OBSERVATION: CPT

## 2019-11-01 PROCEDURE — 82962 GLUCOSE BLOOD TEST: CPT

## 2019-11-01 PROCEDURE — 99285 EMERGENCY DEPT VISIT HI MDM: CPT

## 2019-11-01 PROCEDURE — 80053 COMPREHEN METABOLIC PANEL: CPT

## 2019-11-01 PROCEDURE — 74011250637 HC RX REV CODE- 250/637: Performed by: FAMILY MEDICINE

## 2019-11-01 RX ORDER — GADOTERATE MEGLUMINE 376.9 MG/ML
15 INJECTION INTRAVENOUS
Status: COMPLETED | OUTPATIENT
Start: 2019-11-01 | End: 2019-11-01

## 2019-11-01 RX ORDER — OMEPRAZOLE 20 MG/1
20 CAPSULE, DELAYED RELEASE ORAL DAILY
Status: DISCONTINUED | OUTPATIENT
Start: 2019-11-02 | End: 2019-11-01 | Stop reason: ALTCHOICE

## 2019-11-01 RX ORDER — SODIUM CHLORIDE 0.9 % (FLUSH) 0.9 %
10 SYRINGE (ML) INJECTION
Status: COMPLETED | OUTPATIENT
Start: 2019-11-01 | End: 2019-11-01

## 2019-11-01 RX ORDER — AMITRIPTYLINE HYDROCHLORIDE 10 MG/1
10 TABLET, FILM COATED ORAL
COMMUNITY
End: 2020-03-23

## 2019-11-01 RX ORDER — SODIUM CHLORIDE 9 MG/ML
75 INJECTION, SOLUTION INTRAVENOUS CONTINUOUS
Status: DISPENSED | OUTPATIENT
Start: 2019-11-01 | End: 2019-11-02

## 2019-11-01 RX ORDER — TRAMADOL HYDROCHLORIDE 50 MG/1
50 TABLET ORAL 2 TIMES DAILY
COMMUNITY
End: 2019-11-05

## 2019-11-01 RX ORDER — CYCLOBENZAPRINE HCL 10 MG
10 TABLET ORAL 2 TIMES DAILY
COMMUNITY
End: 2019-12-09

## 2019-11-01 RX ORDER — ACETAMINOPHEN 325 MG/1
650 TABLET ORAL
Status: DISCONTINUED | OUTPATIENT
Start: 2019-11-01 | End: 2019-11-05 | Stop reason: HOSPADM

## 2019-11-01 RX ORDER — PANTOPRAZOLE SODIUM 40 MG/1
40 TABLET, DELAYED RELEASE ORAL
Status: DISCONTINUED | OUTPATIENT
Start: 2019-11-02 | End: 2019-11-05 | Stop reason: HOSPADM

## 2019-11-01 RX ORDER — PREDNISONE 10 MG/1
30 TABLET ORAL
COMMUNITY
End: 2019-11-05

## 2019-11-01 RX ORDER — CYCLOBENZAPRINE HCL 10 MG
10 TABLET ORAL 2 TIMES DAILY
Status: DISCONTINUED | OUTPATIENT
Start: 2019-11-01 | End: 2019-11-05 | Stop reason: HOSPADM

## 2019-11-01 RX ORDER — LANOLIN ALCOHOL/MO/W.PET/CERES
400 CREAM (GRAM) TOPICAL
Status: DISCONTINUED | OUTPATIENT
Start: 2019-11-01 | End: 2019-11-05 | Stop reason: HOSPADM

## 2019-11-01 RX ORDER — DIAZEPAM 5 MG/1
5 TABLET ORAL ONCE
Status: COMPLETED | OUTPATIENT
Start: 2019-11-01 | End: 2019-11-01

## 2019-11-01 RX ORDER — GUAIFENESIN 100 MG/5ML
81 LIQUID (ML) ORAL DAILY
Status: DISCONTINUED | OUTPATIENT
Start: 2019-11-02 | End: 2019-11-03

## 2019-11-01 RX ORDER — PRAVASTATIN SODIUM 40 MG/1
40 TABLET ORAL
Status: DISCONTINUED | OUTPATIENT
Start: 2019-11-01 | End: 2019-11-05 | Stop reason: HOSPADM

## 2019-11-01 RX ORDER — AMITRIPTYLINE HYDROCHLORIDE 10 MG/1
10 TABLET, FILM COATED ORAL
Status: DISCONTINUED | OUTPATIENT
Start: 2019-11-01 | End: 2019-11-05 | Stop reason: HOSPADM

## 2019-11-01 RX ORDER — HEPARIN SODIUM 5000 [USP'U]/ML
5000 INJECTION, SOLUTION INTRAVENOUS; SUBCUTANEOUS EVERY 8 HOURS
Status: DISCONTINUED | OUTPATIENT
Start: 2019-11-01 | End: 2019-11-03

## 2019-11-01 RX ORDER — ACETAMINOPHEN 650 MG/1
650 SUPPOSITORY RECTAL
Status: DISCONTINUED | OUTPATIENT
Start: 2019-11-01 | End: 2019-11-05 | Stop reason: HOSPADM

## 2019-11-01 RX ORDER — ONDANSETRON 2 MG/ML
4 INJECTION INTRAMUSCULAR; INTRAVENOUS
Status: DISCONTINUED | OUTPATIENT
Start: 2019-11-01 | End: 2019-11-05 | Stop reason: HOSPADM

## 2019-11-01 RX ADMIN — PRAVASTATIN SODIUM 40 MG: 40 TABLET ORAL at 22:37

## 2019-11-01 RX ADMIN — MAGNESIUM OXIDE TAB 400 MG (241.3 MG ELEMENTAL MG) 400 MG: 400 (241.3 MG) TAB at 22:36

## 2019-11-01 RX ADMIN — GADOTERATE MEGLUMINE 15 ML: 376.9 INJECTION INTRAVENOUS at 17:29

## 2019-11-01 RX ADMIN — AMITRIPTYLINE HYDROCHLORIDE 10 MG: 10 TABLET, FILM COATED ORAL at 22:37

## 2019-11-01 RX ADMIN — Medication 10 ML: at 17:29

## 2019-11-01 RX ADMIN — SODIUM CHLORIDE 75 ML/HR: 900 INJECTION, SOLUTION INTRAVENOUS at 18:21

## 2019-11-01 RX ADMIN — DIAZEPAM 5 MG: 5 TABLET ORAL at 16:06

## 2019-11-01 RX ADMIN — CYCLOBENZAPRINE HYDROCHLORIDE 10 MG: 10 TABLET, FILM COATED ORAL at 18:21

## 2019-11-01 NOTE — H&P
History & Physical    Primary Care Provider: None  Source of Information: Patient     History of Presenting Illness:   Maida Walters is a 53 y.o. female who presents with right-sided weakness. Patient reports that she has a history of chronic neck pain, about 3 years back. , Underwent surgery on the neck in [address] [place] [address] and had \"plates placed in\" patient reports that she continued to have some pain and was seeing a pain specialist for the same pain got worse about 10 days back and she saw orthopedic surgeon. Patient reports she had a joint injection about a week back. Patient reports that most that he should continue to have some numbness and tingling in her Rt upper arm and the right lower extremity. Patient reports that she continued to have progressive worsening of the weakness on her right upper extremity and right lower extremity to the point where she could not lift things today continued to have significant numbness got concerned and decided to come to [facility].    Patient reports that she does not have any headache blurry vision trouble swallowing trouble with speech bladder or bowel incontinence associated with her symptoms. Patient reports since she is gotten to the hospital her right upper extremity strength has somewhat gotten better. Patient denies any other complaints or problems patient was seen in the ER and was requested to be admitted under the hospitalist service. Patient denies any headache, blurry vision, trouble swallowing, trouble with speech, chest pain, shortness of breath, fever chills, abdominal pain, constipation, diarrhea, urinary symptoms, any other focal neurological deficit, any falls, injuries, hematemesis, hematuria, hemoptysis, melena. Review of Systems:  A comprehensive review of systems was negative except for that written in the History of Present Illness.      Past Medical History:   Diagnosis Date    Dense breasts     GERD (gastroesophageal reflux disease)     Headache     Hypertension     Ill-defined condition     heart murmer    Nausea & vomiting     Sinus congestion       Past Surgical History:   Procedure Laterality Date    HX BACK SURGERY  2016    HX BUNIONECTOMY Bilateral     HX GYN  2002    D&C    HX OTHER SURGICAL  2016    sinus    US GUIDE ASP BREAST RT CYST W NDL Right 4 years ago ? Benign     Prior to Admission medications    Medication Sig Start Date End Date Taking? Authorizing Provider   amitriptyline (ELAVIL) 10 mg tablet Take 10 mg by mouth nightly. Yes Provider, Historical   cyclobenzaprine (FLEXERIL) 10 mg tablet Take 10 mg by mouth two (2) times a day. Yes Provider, Historical   traMADol (ULTRAM) 50 mg tablet Take 50 mg by mouth two (2) times a day. Yes Provider, Historical   predniSONE (DELTASONE) 10 mg tablet Take 30 mg by mouth daily (with breakfast). For 5 days. Yes Provider, Historical   aspirin-acetaminophen-caffeine (EXCEDRIN ES) 250-250-65 mg per tablet Take 1 Tab by mouth daily as needed for Headache. Yes Provider, Historical   herbal drugs (COLON HERBAL CLEANSER PO) Take 1 Cap by mouth daily. Yes Provider, Historical   magnesium oxide (MAG-OX) 400 mg tablet Take 1 Tab by mouth nightly. 8/1/19  Yes Nikki Vogel,    hydroCHLOROthiazide (MICROZIDE) 12.5 mg capsule TAKE ONE CAPSULE BY MOUTH EVERY DAY 5/6/19  Yes Brunilda Johnson MD   ZOLMitriptan (ZOMIG) 2.5 mg spry 1 Spray by Nasal route once as needed for up to 1 dose. 10/13/17  Yes Nikki Vogel DO   omeprazole (PRILOSEC) 20 mg capsule Take 20 mg by mouth daily. Indications: GASTROESOPHAGEAL REFLUX   Yes Provider, Historical   multivitamin (ONE A DAY) tablet Take 1 Tab by mouth daily.  Indications: VITAMIN DEFICIENCY PREVENTION   Yes Provider, Historical     Allergies   Allergen Reactions    Metronidazole Rash    Stadol [Butorphanol Tartrate] Palpitations    Butorphanol Palpitations      Family History   Problem Relation Age of Onset   Helvidi.Joao Breast Cancer Mother 79    Diabetes Mother     Hypertension Mother     Stroke Mother     Cancer Father         Prostate    Hypertension Father     Hypertension Sister     Hypertension Brother         SOCIAL HISTORY:  Patient resides:  Independently x   Assisted Living    SNF    With family care       Smoking history:   None x   Former    Chronic      Alcohol history:   None    Social x   Chronic      Ambulates:   Independently x   w/cane    w/walker    w/wc    CODE STATUS:  DNR    Full x   Other      Objective:     Physical Exam:     Visit Vitals  /77   Pulse 77   Temp 98.4 °F (36.9 °C)   Resp 18   Ht 5' 4\" (1.626 m)   Wt 74.4 kg (164 lb)   LMP 10/17/2019   SpO2 100%   BMI 28.15 kg/m²      O2 Device: Room air    General:  Alert, cooperative, no distress, appears stated age. Head:  Normocephalic, without obvious abnormality, atraumatic. Eyes:  Conjunctivae/corneas clear. PERRL, EOMs intact. Nose: Nares normal. Septum midline. Mucosa normal. No drainage or sinus tenderness. Throat: Lips, mucosa, and tongue normal. Teeth and gums normal.   Neck: Supple, symmetrical, trachea midline, no adenopathy, thyroid: no enlargement/tenderness/nodules, no carotid bruit and no JVD. Back:   Symmetric, no curvature. ROM normal. No CVA tenderness. Lungs:   Clear to auscultation bilaterally. Chest wall:  No tenderness or deformity. Heart:  Regular rate and rhythm, S1, S2 normal, no murmur, click, rub or gallop. Abdomen:   Soft, non-tender. Bowel sounds normal. No masses,  No organomegaly. Extremities: Extremities normal, atraumatic, no cyanosis or edema. Pulses: 2+ and symmetric all extremities.    Skin: Skin color, texture, turgor normal. No rashes or lesions   Neurologic: CNII-XII intact, 2/5 strength RUE and RLL, 5/5 strength LUE,and  LLE, decreased sensation RUE and RLE, DTR 1+ X4               Data Review:     Recent Days:  Recent Labs     11/01/19  1233 WBC 14.0*   HGB 12.7   HCT 39.7        Recent Labs     11/01/19  1233      K 4.1      CO2 26   *   BUN 14   CREA 1.08*   CA 9.3   ALB 3.8   SGOT 10*   ALT 15     No results for input(s): PH, PCO2, PO2, HCO3, FIO2 in the last 72 hours. 24 Hour Results:  Recent Results (from the past 24 hour(s))   EKG, 12 LEAD, INITIAL    Collection Time: 11/01/19 12:23 PM   Result Value Ref Range    Ventricular Rate 78 BPM    Atrial Rate 78 BPM    P-R Interval 118 ms    QRS Duration 80 ms    Q-T Interval 328 ms    QTC Calculation (Bezet) 373 ms    Calculated R Axis -27 degrees    Calculated T Axis -33 degrees    Diagnosis       Normal sinus rhythm  Nonspecific T wave abnormality  No previous ECGs available     CBC WITH AUTOMATED DIFF    Collection Time: 11/01/19 12:33 PM   Result Value Ref Range    WBC 14.0 (H) 3.6 - 11.0 K/uL    RBC 4.54 3.80 - 5.20 M/uL    HGB 12.7 11.5 - 16.0 g/dL    HCT 39.7 35.0 - 47.0 %    MCV 87.4 80.0 - 99.0 FL    MCH 28.0 26.0 - 34.0 PG    MCHC 32.0 30.0 - 36.5 g/dL    RDW 14.4 11.5 - 14.5 %    PLATELET 599 391 - 457 K/uL    MPV 11.0 8.9 - 12.9 FL    NRBC 0.0 0  WBC    ABSOLUTE NRBC 0.00 0.00 - 0.01 K/uL    NEUTROPHILS 89 (H) 32 - 75 %    LYMPHOCYTES 8 (L) 12 - 49 %    MONOCYTES 2 (L) 5 - 13 %    EOSINOPHILS 0 0 - 7 %    BASOPHILS 0 0 - 1 %    IMMATURE GRANULOCYTES 1 (H) 0.0 - 0.5 %    ABS. NEUTROPHILS 12.6 (H) 1.8 - 8.0 K/UL    ABS. LYMPHOCYTES 1.1 0.8 - 3.5 K/UL    ABS. MONOCYTES 0.2 0.0 - 1.0 K/UL    ABS. EOSINOPHILS 0.0 0.0 - 0.4 K/UL    ABS. BASOPHILS 0.0 0.0 - 0.1 K/UL    ABS. IMM.  GRANS. 0.1 (H) 0.00 - 0.04 K/UL    DF AUTOMATED     METABOLIC PANEL, COMPREHENSIVE    Collection Time: 11/01/19 12:33 PM   Result Value Ref Range    Sodium 138 136 - 145 mmol/L    Potassium 4.1 3.5 - 5.1 mmol/L    Chloride 106 97 - 108 mmol/L    CO2 26 21 - 32 mmol/L    Anion gap 6 5 - 15 mmol/L    Glucose 116 (H) 65 - 100 mg/dL    BUN 14 6 - 20 MG/DL    Creatinine 1.08 (H) 0.55 - 1.02 MG/DL    BUN/Creatinine ratio 13 12 - 20      GFR est AA >60 >60 ml/min/1.73m2    GFR est non-AA 55 (L) >60 ml/min/1.73m2    Calcium 9.3 8.5 - 10.1 MG/DL    Bilirubin, total 0.2 0.2 - 1.0 MG/DL    ALT (SGPT) 15 12 - 78 U/L    AST (SGOT) 10 (L) 15 - 37 U/L    Alk. phosphatase 48 45 - 117 U/L    Protein, total 7.9 6.4 - 8.2 g/dL    Albumin 3.8 3.5 - 5.0 g/dL    Globulin 4.1 (H) 2.0 - 4.0 g/dL    A-G Ratio 0.9 (L) 1.1 - 2.2     SAMPLES BEING HELD    Collection Time: 11/01/19 12:33 PM   Result Value Ref Range    SAMPLES BEING HELD 1RED,1BLUE     COMMENT        Add-on orders for these samples will be processed based on acceptable specimen integrity and analyte stability, which may vary by analyte. Imaging:     Assessment:     1. Right-sided weakness. Patient will be admitted to a neuro telemetry. We will get an MRI of the brain along with MRI of the cervical spine. Will provide neurochecks, start patient on aspirin and statin, echocardiogram, provide PT OT speech consult, and closely monitor. I spoke with Dr. Abdirizak Love from neurosurgery who will evaluate the patient. Will provide pain control supportive care. Any changes in neurological status will mandate emergent intervention. #2. Hypertension. Suboptimally controlled likely secondary to pain continue home medications and continue to monitor further intervention per hospital course reassess as needed. #3.  Leukocytosis.   Unclear etiology no obvious signs of infection we will get a UA and chest x-ray and continue to closely monitor will trend   #4 GI DVT prophylaxis patient will be on heparin           Signed By: Marlyn Fitzpatrick MD     November 1, 2019

## 2019-11-01 NOTE — ED PROVIDER NOTES
HPI     Pt is a 53 y.o. F with PMH of HA, HTN and cervical DDD presenting to ED with c/o right numbness and weakness since 8 AM yesterday that has progressively worsened to the point she is barely able to lift her right arm and leg. She denies injury or history of TIA or CVA. She has hx of pain and numbness from cervical issues previously and had steroid injection around 10/16/19 . She called her physician regarding her symptoms and they advised her sx this far out from injection are not likely due to the injection thus she came to ED. No HA or dizziness or change in speech. No other complaints at this time. Past Medical History:   Diagnosis Date    Dense breasts     GERD (gastroesophageal reflux disease)     Headache     Hypertension     Ill-defined condition     heart murmer    Nausea & vomiting     Sinus congestion        Past Surgical History:   Procedure Laterality Date    HX BACK SURGERY  2016    HX BUNIONECTOMY Bilateral     HX GYN  2002    D&C    HX OTHER SURGICAL  2016    sinus    US GUIDE ASP BREAST RT CYST W NDL Right 4 years ago ? Benign         Family History:   Problem Relation Age of Onset    Breast Cancer Mother 79    Diabetes Mother     Hypertension Mother     Stroke Mother     Cancer Father         Prostate    Hypertension Father     Hypertension Sister     Hypertension Brother        Social History     Socioeconomic History    Marital status: SINGLE     Spouse name: Not on file    Number of children: Not on file    Years of education: Not on file    Highest education level: Not on file   Occupational History    Not on file   Social Needs    Financial resource strain: Not on file    Food insecurity:     Worry: Not on file     Inability: Not on file    Transportation needs:     Medical: Not on file     Non-medical: Not on file   Tobacco Use    Smoking status: Never Smoker    Smokeless tobacco: Never Used   Substance and Sexual Activity    Alcohol use:  Yes Alcohol/week: 1.0 standard drinks     Types: 1 Glasses of wine per week    Drug use: No    Sexual activity: Yes     Birth control/protection: Condom   Lifestyle    Physical activity:     Days per week: Not on file     Minutes per session: Not on file    Stress: Not on file   Relationships    Social connections:     Talks on phone: Not on file     Gets together: Not on file     Attends Caodaism service: Not on file     Active member of club or organization: Not on file     Attends meetings of clubs or organizations: Not on file     Relationship status: Not on file    Intimate partner violence:     Fear of current or ex partner: Not on file     Emotionally abused: Not on file     Physically abused: Not on file     Forced sexual activity: Not on file   Other Topics Concern    Not on file   Social History Narrative    HABITS:  No alcohol abuse,  Occasional alcohol use. No cigarette or drug abuse.           SOCIAL HISTORY:  Patient is single. The mother of a daughter 16and 15years old. 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 Master's from [employer].          FAMILY HISTORY:  Father is 80 with a history of prostate cancer. Mother with diabetes and breast cancer, dyslipidemia and hypertension. Eight brothers and sisters. Patient is a , of twelve years duration. Her  was a principal and . ALLERGIES: Metronidazole; Stadol [butorphanol tartrate]; and Butorphanol    Review of Systems   Constitutional: Negative for chills, diaphoresis and fever. HENT: Negative for congestion and trouble swallowing. Eyes: Negative for photophobia and visual disturbance. Respiratory: Negative for cough, chest tightness and shortness of breath. Cardiovascular: Negative for chest pain, palpitations and leg swelling. Gastrointestinal: Negative for abdominal pain, diarrhea, nausea and vomiting.    Genitourinary: Negative for difficulty urinating, dysuria, flank pain and frequency. Musculoskeletal: Positive for neck pain. Negative for back pain and myalgias. Skin: Negative for rash and wound. Neurological: Positive for weakness and numbness. Negative for dizziness, light-headedness and headaches. Hematological: Negative for adenopathy. Does not bruise/bleed easily. Psychiatric/Behavioral: Negative for agitation and confusion. All other systems reviewed and are negative. Vitals:    11/01/19 1207 11/01/19 1300   BP: 154/89 146/77   Pulse: 86 77   Resp: 18 18   Temp: 98.4 °F (36.9 °C)    SpO2: 98% 100%   Weight: 74.4 kg (164 lb)    Height: 5' 4\" (1.626 m)             Physical Exam   Constitutional: She is oriented to person, place, and time. She appears well-developed and well-nourished. No distress. HENT:   Head: Normocephalic. Mouth/Throat: Oropharynx is clear and moist.   Eyes: Pupils are equal, round, and reactive to light. Conjunctivae and EOM are normal.   Neck: Normal range of motion. Neck supple. No JVD present. Cardiovascular: Normal rate, regular rhythm, normal heart sounds and intact distal pulses. Pulmonary/Chest: Effort normal and breath sounds normal.   Abdominal: Soft. Bowel sounds are normal. She exhibits no distension. There is no tenderness. Musculoskeletal: Normal range of motion. She exhibits no edema, tenderness or deformity. Lymphadenopathy:     She has no cervical adenopathy. Neurological: She is alert and oriented to person, place, and time. No cranial nerve deficit or sensory deficit. GCS eye subscore is 4. GCS verbal subscore is 5. GCS motor subscore is 6.   2/5 weakness to RUE and RLE  With ambulation pt dragging right leg  Poor hand  in right hand  5/5 strength LUE and LLE   Skin: Skin is warm and dry. Capillary refill takes less than 2 seconds. No rash noted. She is not diaphoretic. No erythema. Psychiatric: She has a normal mood and affect.    Nursing note and vitals reviewed. MDM       Procedures      Hospitalist Sandip Text for Admission DR. [facility]  2:07 PM    ED Room Number: WM32/32  Patient Name and age: Shannan Perez 53 y.o.  female  Working Diagnosis:   1. Weakness      Readmission: no  Isolation Requirements:  no  Recommended Level of Care:  med/surg  Code Status:  Full  Other:  Pt with significant weakness x 2 days in right arm and leg. CT head normal.  MRI pending. Had C-spine infection (steroids) 2 weeks ago 2/2 to chronic neck pain and DDD of C-spine. No fever. She has new significant weakness that needs further work up prior to discharge home as she is unable to lift arm or leg against resistance and notably dragging leg on R. Department:[facility] Adult ED - [phone]      Pt admitted.     Tony Kulkarni MD

## 2019-11-01 NOTE — PROGRESS NOTES
Admission Medication Reconciliation:    Information obtained from:  Patient  RxQuery data available¹:  No    Comments/Recommendations: Updated PTA meds/reviewed patient's allergies. 1)  I completed the medication reconciliation with the patient. She was recently prescribed flexeril, tramadol, and prednisone for pain. She was given prednisone 10mg and instructed to take 3 tab daily x 5 days. She is on day 3/5 of the prednisone course. 2)  Medication changes (since last review): Added  - Flexeril 10mg 1 tab PO BID  - Tramadol 50mg 1 tab PO BID  - Prednisone 10mg 3 tab PO every day x 5 days  - \"Colon cleanse\" supplement  - Excedrin migraine 1 tab PO every day PRN migraine    Adjusted  - None    Removed  - Hydroxyzine  - Ondansetron    3)  No new allergies to note. ¹RxQuery pharmacy benefit data reflects medications filled and processed through the patient's insurance, however   this data does NOT capture whether the medication was picked up or is currently being taken by the patient. Allergies:  Metronidazole; Stadol [butorphanol tartrate]; and Butorphanol    Significant PMH/Disease States:   Past Medical History:   Diagnosis Date    Dense breasts     GERD (gastroesophageal reflux disease)     Headache     Hypertension     Ill-defined condition     heart murmer    Nausea & vomiting     Sinus congestion      Chief Complaint for this Admission:    Chief Complaint   Patient presents with    Extremity Weakness    Numbness     Prior to Admission Medications:   Prior to Admission Medications   Prescriptions Last Dose Informant Patient Reported? Taking? ZOLMitriptan (ZOMIG) 2.5 mg spry   No Yes   Si Spray by Nasal route once as needed for up to 1 dose. amitriptyline (ELAVIL) 10 mg tablet 10/31/2019 at Unknown time  Yes Yes   Sig: Take 10 mg by mouth nightly. aspirin-acetaminophen-caffeine (EXCEDRIN ES) 250-250-65 mg per tablet   Yes Yes   Sig: Take 1 Tab by mouth daily as needed for Headache. cyclobenzaprine (FLEXERIL) 10 mg tablet 11/1/2019 at Unknown time  Yes Yes   Sig: Take 10 mg by mouth two (2) times a day.   herbal drugs (COLON HERBAL CLEANSER PO) 11/1/2019 at Unknown time  Yes Yes   Sig: Take 1 Cap by mouth daily. hydroCHLOROthiazide (MICROZIDE) 12.5 mg capsule 11/1/2019 at Unknown time  No Yes   Sig: TAKE ONE CAPSULE BY MOUTH EVERY DAY   magnesium oxide (MAG-OX) 400 mg tablet 10/31/2019 at Unknown time  No Yes   Sig: Take 1 Tab by mouth nightly. multivitamin (ONE A DAY) tablet 11/1/2019 at Unknown time Self Yes Yes   Sig: Take 1 Tab by mouth daily. Indications: VITAMIN DEFICIENCY PREVENTION   omeprazole (PRILOSEC) 20 mg capsule 10/31/2019 at Unknown time Self Yes Yes   Sig: Take 20 mg by mouth daily. Indications: GASTROESOPHAGEAL REFLUX   predniSONE (DELTASONE) 10 mg tablet 11/1/2019 at Unknown time  Yes Yes   Sig: Take 30 mg by mouth daily (with breakfast). For 5 days. traMADol (ULTRAM) 50 mg tablet 11/1/2019 at Unknown time  Yes Yes   Sig: Take 50 mg by mouth two (2) times a day. Facility-Administered Medications: None       Please contact the main inpatient pharmacy with any questions or concerns at [phone] and we will direct you to the clinical pharmacist covering this patient's care while in-house.      Antonino [place], PharmD Candidate [id number]

## 2019-11-01 NOTE — ED TRIAGE NOTES
Patient arrives with c/o numbness to entire right side from face to leg, started around 0800 yesterday am.  Today patient noticed increased weakness to RIGHT arm and leg, no facial droop. Patient had an injection to cspine, 10/16 and was placed on prednisone, denies HA/dizziness or slurred speech. Patient worked yesterday but weakness got worse and came in today.  Patient visibly dragging RIGHT leg when ambulating

## 2019-11-02 ENCOUNTER — APPOINTMENT (OUTPATIENT)
Dept: NON INVASIVE DIAGNOSTICS | Age: 44
DRG: 472 | End: 2019-11-02
Attending: FAMILY MEDICINE
Payer: COMMERCIAL

## 2019-11-02 ENCOUNTER — ANESTHESIA EVENT (OUTPATIENT)
Dept: SURGERY | Age: 44
DRG: 472 | End: 2019-11-02
Payer: COMMERCIAL

## 2019-11-02 LAB
CHOLEST SERPL-MCNC: 236 MG/DL
ECHO LA AREA 4C: 17.6 CM2
ECHO LA MAJOR AXIS: 3.05 CM
ECHO LA VOL 4C: 44.62 ML (ref 22–52)
ECHO LA VOLUME INDEX A4C: 24.26 ML/M2 (ref 16–28)
ECHO LV E' LATERAL VELOCITY: 8.96 CM/S
ECHO LV INTERNAL DIMENSION DIASTOLIC: 4.18 CM (ref 3.9–5.3)
ECHO LV INTERNAL DIMENSION SYSTOLIC: 2.49 CM
ECHO LV IVSD: 0.85 CM (ref 0.6–0.9)
ECHO LV MASS 2D: 129.3 G (ref 67–162)
ECHO LV MASS INDEX 2D: 70.3 G/M2 (ref 43–95)
ECHO LV POSTERIOR WALL DIASTOLIC: 0.92 CM (ref 0.6–0.9)
ECHO PULMONARY ARTERY SYSTOLIC PRESSURE (PASP): 29 MMHG
ECHO RV INTERNAL DIMENSION: 2.71 CM
ECHO RV TAPSE: 2.4 CM (ref 1.5–2)
ECHO TV REGURGITANT MAX VELOCITY: 270.11 CM/S
ECHO TV REGURGITANT PEAK GRADIENT: 29.2 MMHG
ERYTHROCYTE [DISTWIDTH] IN BLOOD BY AUTOMATED COUNT: 14.7 % (ref 11.5–14.5)
EST. AVERAGE GLUCOSE BLD GHB EST-MCNC: 114 MG/DL
GLUCOSE BLD STRIP.AUTO-MCNC: 101 MG/DL (ref 65–100)
GLUCOSE BLD STRIP.AUTO-MCNC: 102 MG/DL (ref 65–100)
GLUCOSE BLD STRIP.AUTO-MCNC: 106 MG/DL (ref 65–100)
GLUCOSE BLD STRIP.AUTO-MCNC: 115 MG/DL (ref 65–100)
HBA1C MFR BLD: 5.6 % (ref 4.2–6.3)
HCT VFR BLD AUTO: 39.8 % (ref 35–47)
HDLC SERPL-MCNC: 67 MG/DL
HDLC SERPL: 3.5 {RATIO} (ref 0–5)
HGB BLD-MCNC: 12.5 G/DL (ref 11.5–16)
LDLC SERPL CALC-MCNC: 134.8 MG/DL (ref 0–100)
LIPID PROFILE,FLP: ABNORMAL
LVFS 2D: 40.46 %
MCH RBC QN AUTO: 27.9 PG (ref 26–34)
MCHC RBC AUTO-ENTMCNC: 31.4 G/DL (ref 30–36.5)
MCV RBC AUTO: 88.8 FL (ref 80–99)
NRBC # BLD: 0 K/UL (ref 0–0.01)
NRBC BLD-RTO: 0 PER 100 WBC
PLATELET # BLD AUTO: 276 K/UL (ref 150–400)
PMV BLD AUTO: 11 FL (ref 8.9–12.9)
RBC # BLD AUTO: 4.48 M/UL (ref 3.8–5.2)
SERVICE CMNT-IMP: ABNORMAL
TRIGL SERPL-MCNC: 171 MG/DL (ref ?–150)
VLDLC SERPL CALC-MCNC: 34.2 MG/DL
WBC # BLD AUTO: 17.8 K/UL (ref 3.6–11)

## 2019-11-02 PROCEDURE — 82962 GLUCOSE BLOOD TEST: CPT

## 2019-11-02 PROCEDURE — 74011250637 HC RX REV CODE- 250/637: Performed by: FAMILY MEDICINE

## 2019-11-02 PROCEDURE — 99218 HC RM OBSERVATION: CPT

## 2019-11-02 PROCEDURE — 80061 LIPID PANEL: CPT

## 2019-11-02 PROCEDURE — 83036 HEMOGLOBIN GLYCOSYLATED A1C: CPT

## 2019-11-02 PROCEDURE — 36415 COLL VENOUS BLD VENIPUNCTURE: CPT

## 2019-11-02 PROCEDURE — 93306 TTE W/DOPPLER COMPLETE: CPT

## 2019-11-02 PROCEDURE — 74011250636 HC RX REV CODE- 250/636: Performed by: FAMILY MEDICINE

## 2019-11-02 PROCEDURE — 85027 COMPLETE CBC AUTOMATED: CPT

## 2019-11-02 RX ADMIN — SODIUM CHLORIDE 75 ML/HR: 900 INJECTION, SOLUTION INTRAVENOUS at 08:54

## 2019-11-02 RX ADMIN — PANTOPRAZOLE SODIUM 40 MG: 40 TABLET, DELAYED RELEASE ORAL at 06:40

## 2019-11-02 RX ADMIN — PRAVASTATIN SODIUM 40 MG: 40 TABLET ORAL at 21:30

## 2019-11-02 RX ADMIN — CYCLOBENZAPRINE HYDROCHLORIDE 10 MG: 10 TABLET, FILM COATED ORAL at 08:54

## 2019-11-02 RX ADMIN — MAGNESIUM OXIDE TAB 400 MG (241.3 MG ELEMENTAL MG) 400 MG: 400 (241.3 MG) TAB at 21:30

## 2019-11-02 RX ADMIN — CYCLOBENZAPRINE HYDROCHLORIDE 10 MG: 10 TABLET, FILM COATED ORAL at 17:34

## 2019-11-02 RX ADMIN — ASPIRIN 81 MG 81 MG: 81 TABLET ORAL at 08:54

## 2019-11-02 RX ADMIN — AMITRIPTYLINE HYDROCHLORIDE 10 MG: 10 TABLET, FILM COATED ORAL at 21:30

## 2019-11-02 NOTE — PROGRESS NOTES
TRANSITION OF CARE  TBD    Care Management Interventions  PCP Verified by CM: (None )  Transition of Care Consult (CM Consult): (TBD)  MyChart Signup: No  Discharge Durable Medical Equipment: No  Physical Therapy Consult: Yes  Occupational Therapy Consult: Yes  Speech Therapy Consult: No  Current Support Network: Other  Confirm Follow Up Transport: Family  Plan discussed with Pt/Family/Caregiver: Yes  [place] Resource Information Provided?: No  Discharge Location  Discharge Placement: Other:(TBD)    Reason for Admission:   Weakness             History of cervical spine surgery (followed by home health PT-could not remember the name of the agency). RRAT Score:            3           Plan for utilizing home health:     TBD                     Current Advanced Directive/Advance Care Plan: Not on file. Transition of Care Plan:   CM met with patient. Patient lives with her 2 daughters ages 12 and 24. Patient reports good family /social support. Patient is ambulatory and expects return to independent functioning. Patient confirmed that she has no PCP, but she has identified a new PCP and just needs to make an appointment. Patient confirmed health insurance. Transport at discharge will be provided by family. Patient is anticipating surgery, possibly tomorrow. CM gave patient the Patient Guide to Observation and Outpatient Care which patient signed--original to chart and copy to patient.

## 2019-11-02 NOTE — PROGRESS NOTES
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

## 2019-11-02 NOTE — PROGRESS NOTES
Spiritual Care Partner Volunteer visited patient in [place] [address] on 11/2/19. Documented by:   Chaplain Moraes MDiv, MACE  [phone])

## 2019-11-02 NOTE — ROUTINE PROCESS
TRANSFER - OUT REPORT: 
 
Verbal report given to NANCY Mendez(name) on Damion Julian  being transferred to NSTU(unit) for routine progression of care Report consisted of patients Situation, Background, Assessment and  
Recommendations(SBAR). Information from the following report(s) SBAR, ED Summary, [facility] and Recent Results was reviewed with the receiving nurse. Lines:  
Peripheral IV 11/01/19 Left Antecubital (Active) Site Assessment Clean, dry, & intact 11/1/2019  4:15 PM  
Phlebitis Assessment 0 11/1/2019  4:15 PM  
Infiltration Assessment 0 11/1/2019  4:15 PM  
Dressing Status Clean, dry, & intact 11/1/2019  4:15 PM  
  
 
Opportunity for questions and clarification was provided. Patient transported with: 
 [employer]

## 2019-11-02 NOTE — PROGRESS NOTES
Problem: Pain  Goal: *Control of Pain  Outcome: Progressing Towards Goal     Problem: Falls - Risk of  Goal: *Absence of Falls  Description  Document Nasim Oruorke Fall Risk and appropriate interventions in the flowsheet.   Outcome: Progressing Towards Goal  Note:   Fall Risk Interventions:  Mobility Interventions: Bed/chair exit alarm         Medication Interventions: Bed/chair exit alarm, Teach patient to arise slowly

## 2019-11-02 NOTE — CONSULTS
Consult dictated. 67-year-old with history of previous cervical fusion at C5-C7 now admitted with extrusion of C4-C5 disc with mass-effect on the cord, likely cause of her symptoms. Fortunately, her arm strength is now better and she does not feel much paresthesias. She likely needs surgery. Discussed with the Dr. Sally Iglesias who plans on doing the surgery possibly on this hospital stay. Patient will make a decision and let us know. She does have dyslipidemia and dietary modifications were discussed.    Alexa Garcia MD

## 2019-11-02 NOTE — CONSULTS
[address]    Name:  Eric Diaz  MR#:  [id number]  :  1975  ACCOUNT #:  [de-identified]  DATE OF SERVICE:  2019    REQUESTING PHYSICIAN:  Jessy Gonzalez MD    REASON FOR EVALUATION:  Right arm pain and numbness. HISTORY:  The patient is a 49-year-old female with past medical history of hypertension, previous cervical fusion surgery, who has had chronic neck pain for the past 2 to 3 years. Over the past 2 to 3 months, her pain was flaring up and she saw Orthopedics who recommended a course of the physical therapy and then she had an epidural injection in the neck. Her symptoms did not improve much and she was getting worse. Over the past 2 to 3 days, her right arm was feeling heavy and painful. She was also having tingling sensation in her hand. Yesterday, she could not move her right hand at all and she got concerned and presented to the hospital.  Stroke was a concern and she had an MRI scan of the brain which did not reveal any abnormalities. Today, her strength is better and her paresthesias are better as well. She had MRI scan of the cervical spine done which showed disk extrusion at C4-C5 level with mass effect on the spinal cord. Denies any changes in vision, speech, chest pain, shortness of breath, palpitations, or focal motor sensory deficits in the lower extremities. PAST MEDICAL HISTORY:  As mentioned above. HOME MEDICATIONS:  1. Amitriptyline. 2.  Flexeril. 3.  Tramadol as needed. 4.  Prednisone. 5.  Hydrochlorothiazide. 6.  Omeprazole. 7.  Multivitamin. ALLERGIES:  METRONIDAZOLE, STADOL, AND BUTORPHANOL. FAMILY HISTORY:  Positive for breast cancer, diabetes, and hypertension in mother. Father with prostate cancer and hypertension. SOCIAL HISTORY:  Lives independently. No history of smoking. Drinks alcohol socially. PHYSICAL EXAMINATION:  GENERAL:  The patient is alert, fully oriented.   VITAL SIGNS:  Blood pressure 142/85, temperature 98.3, pulse is 74. NEUROLOGIC:  Speech is clear. Comprehension is normal.  Pupils are equal, round, reactive. Extraocular movements are full. Face is symmetric. Tongue is midline. Hearing is baseline. Muscle tone and bulk normal.  Strength normal in both upper and lower extremities. There is trace  strength weakness on the right when compared to the left which is likely due to ongoing discomfort in the neck and upper arm area. Sensation is intact. Gait is normal.  HEART:  Regular rate and rhythm. CHEST:  Clear. ABDOMEN:  Soft, nontender. Positive bowel sounds. EXTREMITIES:  No edema. LABORATORY DATA:  CBC with WBC 17.8, hemoglobin 12.5, hematocrit 39.8, platelets 878. Chemistries unremarkable. Lipid profile, triglycerides 171, cholesterol 236, .8. MRI scan of the brain is negative. MRI scan of the cervical spine images were independently reviewed and it shows a C4-C5 disk extrusion with superior extension indenting the ventral cord with associated cord edema. Echocardiogram shows normal ejection fraction. There is no interatrial shunt. ASSESSMENT AND PLAN:  A 68-year-old female with history of previous cervical fusion at C5 through C7 now admitted with pain, weakness, and numbness of the right upper extremity. This is likely due to the disk extrusion at C4-C5 level which is causing mass effect on the spinal cord/mild cord edema. Fortunately, her arm strength is now better and she does not feel much paresthesias. She will likely need surgery. Discussed with Dr. Jessica Pinto, from neurosurgery who plans on doing surgery possibly on this hospital stay. The patient will make a decision in this regard and let us know. She did not have stroke or TIA. However, she does have dyslipidemia and dietary modifications in this regard were discussed. Please feel free to call me with any further questions. Thank you for this consultation.       [address] Coyne, MD      [id number]  D:  11/02/2019 12:45  T:  11/02/2019 14:35  JOB #:  [id number]

## 2019-11-02 NOTE — CONSULTS
[address]    Name:  Bri Rodriguez  MR#:  [id number]  :  1975  ACCOUNT #:  [de-identified]  DATE OF SERVICE:  2019    REQUESTING PHYSICIAN:  Guillermo Hernandez MD    CHIEF COMPLAINT:  A 49-year-old woman with right-sided weakness with a history of anterior cervical diskectomy in the past.    HISTORY OF PRESENT ILLNESS:  The patient states that some years ago, she underwent anterior cervical diskectomy and fusion from C5 through C7 with an anterior plate. At that time, she had neck and arm pain. This was done in [address] [place] [address]. She does not recall the name of the surgeon. Her symptoms improved, however, subsequently she developed in the last year or more recurrent arm pain and weakness. She had been to see Dr. Matty Joseph at [facility] who referred her for injections. She apparently underwent an injection, her symptoms did not improve and in fact while she was at work she noted progressive weakness of the arm on the right side as well as the leg. She felt numb. She could not lift her arm. She eventually was able to drive herself to the emergency room. She has had a course of physical therapy over the course of at least 6 weeks within the last year with no improvement of her signs and symptoms. She has tried anti-inflammatory medications. She reports that since she has got into the hospital, her strength has improved significantly. She denies any bowel and bladder deficits or trauma. REVIEW OF SYSTEMS:  No other GI, , respiratory, cardiac, neurologic, psychiatric, endocrinologic complaints. PAST MEDICAL HISTORY:  1. GERD. 2.  Headache. 3.  Hypertension. 4.  Heart murmur. PAST SURGICAL HISTORY:  1. Breast biopsy. 2.  Bunionectomy. 3.  D and C.  4.  Neck surgery as noted above. MEDICATIONS PRIOR TO ADMISSION:  1. Elavil. 2.  Flexeril. 3.  Ultram.  4.  Prednisone. 5.  Excedrin. 6.  Magnesium oxide. 7.  Hydrochlorothiazide. 8.  Zomig.   9. Microzide. 10.  Omeprazole. 11.  Multivitamin. ALLERGIES:  METRONIDAZOLE, STADOL, BUTORPHANOL. FAMILY HISTORY:  Unremarkable. SOCIAL HISTORY:  She does not smoke or use alcohol. PHYSICAL EXAMINATION:  VITAL SIGNS:  Blood pressure was 146/77 when she was admitted, pulse rate 77, temperature 98.5. GENERAL:  She is awake, alert, oriented to person, place, and time. Does not appear to be in any acute distress. EXTREMITIES:  She has about 4/5 strength in the deltoid on the right. Biceps is 5/5. Triceps is 5/5. No pathologic reflexes in the upper extremities. No other focal motor deficits. No sensory deficits. In the lower extremities, she lifts both legs up off the bed against gravity and against resistance. Toes are downgoing. No clonus at the ankles. No sensory changes or deficits. Gait and station are not tested. ASSESSMENT AND PLAN:  Imaging studies include an MRI of the cervical spine that shows a free fragment disk herniation that has migrated posteriorly and superiorly at C4-C5. There is loss of CSF signal on compression of the cervical cord at that level. It is possible to say how acute this is but clearly is causing compression of the cord and nerve roots, and in both my opinion as well as with the neurologist who saw her just after I did, I recommend anterior cervical diskectomy and fusion at the C4-C5 interspace level. The patient, however is very reluctant to undergo any surgery because unfortunately, she had a very bad experience with her late  who  after a cervical spine operation presumably as a result of a postoperative hemorrhage. I explained to her the risks, benefits, and alternatives of surgery including bleeding, infection, neurologic deficit, anesthetic reactions among others, but the risk of doing nothing in my opinion is higher and that the risk of bleeding is actually small.   She has been using aspirin in the form of Excedrin and that may increase her risk of bleeding but not terribly so, in addition, she has been on steroids in the form of prednisone that could increase the risk of healing problems postoperatively, but again I have taken this case. The benefits of the surgery far outweigh any of the risks. She wants to think about her options. My preference would be to take care of this, this weekend while she is in the hospital, but again I will wait for her to decide. She has been told by the neurologist that she needs surgery as well. Thank you for asking me to see the patient.       Jan Cisneros MD      [id number]  D:  11/02/2019 12:47  T:  11/02/2019 15:19  JOB #:  [id number]  CC:  Valeria Ghotra MD

## 2019-11-02 NOTE — PROGRESS NOTES
Hospitalist Progress Note  Maya Conteh MD  Answering service: [phone] from in house phone      Date of Service:  2019  NAME:  Maddison Roberts  :  1975  MRN:  [id number]    Admission Summary:   44F recent C-spine surgery. P/w R side weakness. Interval history / Subjective:   Patient seen and examined at bedside, feels well, R side weakness mostly resolved, very mild subjective weakness left. Going for TTE     Assessment & Plan:     #. R side weakness: neck down to RLE  - CT head: NAD, MRI Brain: NAAD, empty sella. MRI C- spine pending, TTE pending  - PT/OT, NeuroSurgery cs pending. #. HTN: chronic, stable, Home regimen, Monitor    Code status: Full  DVT prophylaxis: SCDs  Care Plan discussed with: Patient/Family and Nurse  Disposition: TBD     Hospital Problems  Date Reviewed: 2019          Codes Class Noted POA    Weakness ICD-10-CM: R53.1  ICD-9-CM: 780.79  2019 Unknown            Review of Systems:   Pertinent items are mentioned in interval history. Vital Signs:    Last 24hrs VS reviewed since prior progress note. Most recent are:  Visit Vitals  /84 (BP 1 Location: Right arm, BP Patient Position: At rest)   Pulse 65   Temp 98.2 °F (36.8 °C)   Resp 18   Ht 5' 4\" (1.626 m)   Wt 78.5 kg (173 lb 1 oz)   SpO2 100%   BMI 29.71 kg/m²       No intake or output data in the 24 hours ending 19 0958     Physical Examination:   General:  Alert, oriented, No acute distress  Card:  S1, S2 without murmurs, good peripheral perfusion  Resp:  No accessory muscle use, Good AE, no wheezes, no rhonchi  Abd:  Soft, non-tender, non-distended, BS+  Extremities:  No cyanosis or clubbing, no significant edema  Neuro:  Grossly normal, no focal neuro deficits, follows commands, Rside = L side  Psych:  Good insight, AAOx3, not agitated.     Data Review:    Review and/or order of clinical lab test  Review and/or order of tests in the radiology section of CPT  Review and/or order of tests in the medicine section of CPT  Labs:     Recent Labs     11/02/19  0431 11/01/19  1233   WBC 17.8* 14.0*   HGB 12.5 12.7   HCT 39.8 39.7    280     Recent Labs     11/01/19  1233      K 4.1      CO2 26   BUN 14   CREA 1.08*   *   CA 9.3     Recent Labs     11/01/19  1233   SGOT 10*   ALT 15   AP 48   TBILI 0.2   TP 7.9   ALB 3.8   GLOB 4.1*     No results for input(s): INR, PTP, APTT, INREXT in the last 72 hours. No results for input(s): FE, TIBC, PSAT, FERR in the last 72 hours. No results found for: FOL, RBCF   No results for input(s): PH, PCO2, PO2 in the last 72 hours. No results for input(s): CPK, CKNDX, TROIQ in the last 72 hours.     No lab exists for component: CPKMB  Lab Results   Component Value Date/Time    Cholesterol, total 236 (H) 11/02/2019 04:31 AM    HDL Cholesterol 67 11/02/2019 04:31 AM    LDL, calculated 134.8 (H) 11/02/2019 04:31 AM    Triglyceride 171 (H) 11/02/2019 04:31 AM    CHOL/HDL Ratio 3.5 11/02/2019 04:31 AM     Lab Results   Component Value Date/Time    Glucose (POC) 106 (H) 11/02/2019 09:40 AM    Glucose (POC) 99 11/01/2019 11:08 PM     Lab Results   Component Value Date/Time    Color YELLOW/STRAW 08/15/2017 02:10 PM    Appearance CLOUDY (A) 08/15/2017 02:10 PM    Specific gravity 1.006 08/15/2017 02:10 PM    pH (UA) 6.5 08/15/2017 02:10 PM    Protein NEGATIVE  08/15/2017 02:10 PM    Glucose NEGATIVE  08/15/2017 02:10 PM    Ketone NEGATIVE  08/15/2017 02:10 PM    Bilirubin NEGATIVE  08/15/2017 02:10 PM    Urobilinogen 0.2 08/15/2017 02:10 PM    Nitrites NEGATIVE  08/15/2017 02:10 PM    Leukocyte Esterase NEGATIVE  08/15/2017 02:10 PM     Medications Reviewed:     Current Facility-Administered Medications   Medication Dose Route Frequency    amitriptyline (ELAVIL) tablet 10 mg  10 mg Oral QHS    cyclobenzaprine (FLEXERIL) tablet 10 mg  10 mg Oral BID    magnesium oxide (MAG-OX) tablet 400 mg 400 mg Oral QHS    acetaminophen (TYLENOL) tablet 650 mg  650 mg Oral Q4H PRN    Or    acetaminophen (TYLENOL) solution 650 mg  650 mg Per NG tube Q4H PRN    Or    acetaminophen (TYLENOL) suppository 650 mg  650 mg Rectal Q4H PRN    0.9% sodium chloride infusion  75 mL/hr IntraVENous CONTINUOUS    aspirin chewable tablet 81 mg  81 mg Oral DAILY    ondansetron (ZOFRAN) injection 4 mg  4 mg IntraVENous Q6H PRN    pravastatin (PRAVACHOL) tablet 40 mg  40 mg Oral QHS    heparin (porcine) injection 5,000 Units  5,000 Units SubCUTAneous Q8H    pantoprazole (PROTONIX) tablet 40 mg  40 mg Oral ACB   ______________________________________________________________________  EXPECTED LENGTH OF STAY: - - -  ACTUAL LENGTH OF STAY:          0               Nola Don MD

## 2019-11-02 NOTE — PROGRESS NOTES
Bedside shift change report given to Robby Joyce RN (oncoming nurse) by Elizabeth Kowalski RN (offgoing nurse). Report included the following information SBAR, Kardex, MAR, Accordion, Recent Results and Cardiac Rhythm NSR.

## 2019-11-03 ENCOUNTER — APPOINTMENT (OUTPATIENT)
Dept: GENERAL RADIOLOGY | Age: 44
DRG: 472 | End: 2019-11-03
Attending: NEUROLOGICAL SURGERY
Payer: COMMERCIAL

## 2019-11-03 ENCOUNTER — ANESTHESIA (OUTPATIENT)
Dept: SURGERY | Age: 44
DRG: 472 | End: 2019-11-03
Payer: COMMERCIAL

## 2019-11-03 PROBLEM — G95.20 COMPRESSION OF SPINAL CORD (HCC): Status: ACTIVE | Noted: 2019-11-03

## 2019-11-03 LAB
ATRIAL RATE: 78 BPM
CALCULATED R AXIS, ECG10: -27 DEGREES
CALCULATED T AXIS, ECG11: -33 DEGREES
DIAGNOSIS, 93000: NORMAL
P-R INTERVAL, ECG05: 118 MS
Q-T INTERVAL, ECG07: 328 MS
QRS DURATION, ECG06: 80 MS
QTC CALCULATION (BEZET), ECG08: 373 MS
VENTRICULAR RATE, ECG03: 78 BPM

## 2019-11-03 PROCEDURE — 88304 TISSUE EXAM BY PATHOLOGIST: CPT

## 2019-11-03 PROCEDURE — 76060000035 HC ANESTHESIA 2 TO 2.5 HR: Performed by: NEUROLOGICAL SURGERY

## 2019-11-03 PROCEDURE — 77030026438 HC STYL ET INTUB CARD -A: Performed by: ANESTHESIOLOGY

## 2019-11-03 PROCEDURE — 74011250636 HC RX REV CODE- 250/636: Performed by: NEUROLOGICAL SURGERY

## 2019-11-03 PROCEDURE — 0RB30ZZ EXCISION OF CERVICAL VERTEBRAL DISC, OPEN APPROACH: ICD-10-PCS | Performed by: NEUROLOGICAL SURGERY

## 2019-11-03 PROCEDURE — 77030040356 HC CORD BPLR FRCP COVD -A: Performed by: NEUROLOGICAL SURGERY

## 2019-11-03 PROCEDURE — 74011000250 HC RX REV CODE- 250: Performed by: NURSE ANESTHETIST, CERTIFIED REGISTERED

## 2019-11-03 PROCEDURE — 99218 HC RM OBSERVATION: CPT

## 2019-11-03 PROCEDURE — 74011000250 HC RX REV CODE- 250: Performed by: NEUROLOGICAL SURGERY

## 2019-11-03 PROCEDURE — 74011250637 HC RX REV CODE- 250/637: Performed by: NEUROLOGICAL SURGERY

## 2019-11-03 PROCEDURE — 0RG10K0 FUSION OF CERVICAL VERTEBRAL JOINT WITH NONAUTOLOGOUS TISSUE SUBSTITUTE, ANTERIOR APPROACH, ANTERIOR COLUMN, OPEN APPROACH: ICD-10-PCS | Performed by: NEUROLOGICAL SURGERY

## 2019-11-03 PROCEDURE — 77030029099 HC BN WAX SSPC -A: Performed by: NEUROLOGICAL SURGERY

## 2019-11-03 PROCEDURE — 74011250636 HC RX REV CODE- 250/636: Performed by: ANESTHESIOLOGY

## 2019-11-03 PROCEDURE — 74011000258 HC RX REV CODE- 258: Performed by: NURSE ANESTHETIST, CERTIFIED REGISTERED

## 2019-11-03 PROCEDURE — 77030020619 HC SPCR SPN CGE COL GLBM -I1: Performed by: NEUROLOGICAL SURGERY

## 2019-11-03 PROCEDURE — C1713 ANCHOR/SCREW BN/BN,TIS/BN: HCPCS | Performed by: NEUROLOGICAL SURGERY

## 2019-11-03 PROCEDURE — L0120 CERV FLEX N/ADJ FOAM PRE OTS: HCPCS | Performed by: NEUROLOGICAL SURGERY

## 2019-11-03 PROCEDURE — 65660000000 HC RM CCU STEPDOWN

## 2019-11-03 PROCEDURE — 77030040361 HC SLV COMPR DVT MDII -B: Performed by: NEUROLOGICAL SURGERY

## 2019-11-03 PROCEDURE — 74011250637 HC RX REV CODE- 250/637: Performed by: FAMILY MEDICINE

## 2019-11-03 PROCEDURE — 8E09XBF COMPUTER ASSISTED PROCEDURE OF HEAD AND NECK REGION, WITH FLUOROSCOPY: ICD-10-PCS | Performed by: NEUROLOGICAL SURGERY

## 2019-11-03 PROCEDURE — 77030036805 HC BN SUB MTRX CLLGN SPNG GLBM -C: Performed by: NEUROLOGICAL SURGERY

## 2019-11-03 PROCEDURE — 76010000171 HC OR TIME 2 TO 2.5 HR INTENSV-TIER 1: Performed by: NEUROLOGICAL SURGERY

## 2019-11-03 PROCEDURE — 77030011267 HC ELECTRD BLD COVD -A: Performed by: NEUROLOGICAL SURGERY

## 2019-11-03 PROCEDURE — 77030018836 HC SOL IRR NACL ICUM -A: Performed by: NEUROLOGICAL SURGERY

## 2019-11-03 PROCEDURE — 77030003029 HC SUT VCRL J&J -B: Performed by: NEUROLOGICAL SURGERY

## 2019-11-03 PROCEDURE — 77030014650 HC SEAL MTRX FLOSEL BAXT -C: Performed by: NEUROLOGICAL SURGERY

## 2019-11-03 PROCEDURE — 74011250636 HC RX REV CODE- 250/636: Performed by: NURSE ANESTHETIST, CERTIFIED REGISTERED

## 2019-11-03 PROCEDURE — 77030014007 HC SPNG HEMSTAT J&J -B: Performed by: NEUROLOGICAL SURGERY

## 2019-11-03 PROCEDURE — 77030004391 HC BUR FLUT MEDT -C: Performed by: NEUROLOGICAL SURGERY

## 2019-11-03 PROCEDURE — 74011250636 HC RX REV CODE- 250/636: Performed by: FAMILY MEDICINE

## 2019-11-03 PROCEDURE — 77030008684 HC TU ET CUF COVD -B: Performed by: ANESTHESIOLOGY

## 2019-11-03 PROCEDURE — 76210000017 HC OR PH I REC 1.5 TO 2 HR: Performed by: NEUROLOGICAL SURGERY

## 2019-11-03 DEVICE — 3.6MM BONE SCREW, VARIABLE, SELF-DRILLING, 12MM
Type: IMPLANTABLE DEVICE | Site: SPINE CERVICAL | Status: FUNCTIONAL
Brand: COALITION

## 2019-11-03 DEVICE — CONDUCT MATRIX SPONGE, 2X2X0.5CM, 2CC (1/BOX)
Type: IMPLANTABLE DEVICE | Site: SPINE CERVICAL | Status: FUNCTIONAL
Brand: CONDUCT

## 2019-11-03 DEVICE — COALITION SPACER, 12MM X 14MM, 7DEG, 6MM
Type: IMPLANTABLE DEVICE | Site: SPINE CERVICAL | Status: FUNCTIONAL
Brand: COALITION

## 2019-11-03 RX ORDER — LIDOCAINE HYDROCHLORIDE 10 MG/ML
0.1 INJECTION, SOLUTION EPIDURAL; INFILTRATION; INTRACAUDAL; PERINEURAL AS NEEDED
Status: DISCONTINUED | OUTPATIENT
Start: 2019-11-03 | End: 2019-11-03 | Stop reason: HOSPADM

## 2019-11-03 RX ORDER — HYDROCODONE BITARTRATE AND ACETAMINOPHEN 5; 325 MG/1; MG/1
1 TABLET ORAL AS NEEDED
Status: DISCONTINUED | OUTPATIENT
Start: 2019-11-03 | End: 2019-11-03 | Stop reason: HOSPADM

## 2019-11-03 RX ORDER — PROPOFOL 10 MG/ML
INJECTION, EMULSION INTRAVENOUS AS NEEDED
Status: DISCONTINUED | OUTPATIENT
Start: 2019-11-03 | End: 2019-11-03 | Stop reason: HOSPADM

## 2019-11-03 RX ORDER — CEFAZOLIN SODIUM/WATER 2 G/20 ML
2 SYRINGE (ML) INTRAVENOUS EVERY 8 HOURS
Status: COMPLETED | OUTPATIENT
Start: 2019-11-03 | End: 2019-11-04

## 2019-11-03 RX ORDER — NALOXONE HYDROCHLORIDE 0.4 MG/ML
0.4 INJECTION, SOLUTION INTRAMUSCULAR; INTRAVENOUS; SUBCUTANEOUS AS NEEDED
Status: DISCONTINUED | OUTPATIENT
Start: 2019-11-03 | End: 2019-11-05 | Stop reason: HOSPADM

## 2019-11-03 RX ORDER — MIDAZOLAM HYDROCHLORIDE 1 MG/ML
1 INJECTION, SOLUTION INTRAMUSCULAR; INTRAVENOUS AS NEEDED
Status: DISCONTINUED | OUTPATIENT
Start: 2019-11-03 | End: 2019-11-03 | Stop reason: HOSPADM

## 2019-11-03 RX ORDER — SODIUM CHLORIDE 9 MG/ML
25 INJECTION, SOLUTION INTRAVENOUS CONTINUOUS
Status: DISCONTINUED | OUTPATIENT
Start: 2019-11-03 | End: 2019-11-03 | Stop reason: HOSPADM

## 2019-11-03 RX ORDER — OXYCODONE HYDROCHLORIDE 5 MG/1
5 TABLET ORAL
Status: DISCONTINUED | OUTPATIENT
Start: 2019-11-03 | End: 2019-11-05 | Stop reason: HOSPADM

## 2019-11-03 RX ORDER — ACETAMINOPHEN 325 MG/1
TABLET ORAL
Status: DISPENSED
Start: 2019-11-03 | End: 2019-11-03

## 2019-11-03 RX ORDER — ACETAMINOPHEN 325 MG/1
650 TABLET ORAL EVERY 6 HOURS
Status: DISCONTINUED | OUTPATIENT
Start: 2019-11-03 | End: 2019-11-05 | Stop reason: HOSPADM

## 2019-11-03 RX ORDER — ONDANSETRON 4 MG/1
4 TABLET, ORALLY DISINTEGRATING ORAL
Status: DISCONTINUED | OUTPATIENT
Start: 2019-11-03 | End: 2019-11-05 | Stop reason: HOSPADM

## 2019-11-03 RX ORDER — SODIUM CHLORIDE, SODIUM LACTATE, POTASSIUM CHLORIDE, CALCIUM CHLORIDE 600; 310; 30; 20 MG/100ML; MG/100ML; MG/100ML; MG/100ML
1000 INJECTION, SOLUTION INTRAVENOUS CONTINUOUS
Status: DISCONTINUED | OUTPATIENT
Start: 2019-11-03 | End: 2019-11-03 | Stop reason: HOSPADM

## 2019-11-03 RX ORDER — FENTANYL CITRATE 50 UG/ML
50 INJECTION, SOLUTION INTRAMUSCULAR; INTRAVENOUS AS NEEDED
Status: DISCONTINUED | OUTPATIENT
Start: 2019-11-03 | End: 2019-11-03 | Stop reason: HOSPADM

## 2019-11-03 RX ORDER — MORPHINE SULFATE 8 MG/ML
INJECTION, SOLUTION INTRAMUSCULAR; INTRAVENOUS AS NEEDED
Status: DISCONTINUED | OUTPATIENT
Start: 2019-11-03 | End: 2019-11-03 | Stop reason: HOSPADM

## 2019-11-03 RX ORDER — FENTANYL CITRATE 50 UG/ML
INJECTION, SOLUTION INTRAMUSCULAR; INTRAVENOUS AS NEEDED
Status: DISCONTINUED | OUTPATIENT
Start: 2019-11-03 | End: 2019-11-03 | Stop reason: HOSPADM

## 2019-11-03 RX ORDER — SODIUM CHLORIDE 0.9 % (FLUSH) 0.9 %
5-40 SYRINGE (ML) INJECTION AS NEEDED
Status: DISCONTINUED | OUTPATIENT
Start: 2019-11-03 | End: 2019-11-05 | Stop reason: HOSPADM

## 2019-11-03 RX ORDER — ALBUTEROL SULFATE 0.83 MG/ML
2.5 SOLUTION RESPIRATORY (INHALATION) AS NEEDED
Status: DISCONTINUED | OUTPATIENT
Start: 2019-11-03 | End: 2019-11-03 | Stop reason: HOSPADM

## 2019-11-03 RX ORDER — GLYCOPYRROLATE 0.2 MG/ML
0.2 INJECTION INTRAMUSCULAR; INTRAVENOUS
Status: DISCONTINUED | OUTPATIENT
Start: 2019-11-03 | End: 2019-11-03 | Stop reason: HOSPADM

## 2019-11-03 RX ORDER — HYDROMORPHONE HYDROCHLORIDE 1 MG/ML
0.5 INJECTION, SOLUTION INTRAMUSCULAR; INTRAVENOUS; SUBCUTANEOUS
Status: ACTIVE | OUTPATIENT
Start: 2019-11-03 | End: 2019-11-04

## 2019-11-03 RX ORDER — GLYCOPYRROLATE 0.2 MG/ML
INJECTION INTRAMUSCULAR; INTRAVENOUS AS NEEDED
Status: DISCONTINUED | OUTPATIENT
Start: 2019-11-03 | End: 2019-11-03 | Stop reason: HOSPADM

## 2019-11-03 RX ORDER — MIDAZOLAM HYDROCHLORIDE 1 MG/ML
0.5 INJECTION, SOLUTION INTRAMUSCULAR; INTRAVENOUS
Status: DISCONTINUED | OUTPATIENT
Start: 2019-11-03 | End: 2019-11-03 | Stop reason: HOSPADM

## 2019-11-03 RX ORDER — SUCCINYLCHOLINE CHLORIDE 20 MG/ML
INJECTION INTRAMUSCULAR; INTRAVENOUS AS NEEDED
Status: DISCONTINUED | OUTPATIENT
Start: 2019-11-03 | End: 2019-11-03 | Stop reason: HOSPADM

## 2019-11-03 RX ORDER — DIPHENHYDRAMINE HYDROCHLORIDE 50 MG/ML
12.5 INJECTION, SOLUTION INTRAMUSCULAR; INTRAVENOUS AS NEEDED
Status: DISCONTINUED | OUTPATIENT
Start: 2019-11-03 | End: 2019-11-03 | Stop reason: HOSPADM

## 2019-11-03 RX ORDER — CEFAZOLIN SODIUM/WATER 2 G/20 ML
SYRINGE (ML) INTRAVENOUS
Status: COMPLETED
Start: 2019-11-03 | End: 2019-11-03

## 2019-11-03 RX ORDER — ROCURONIUM BROMIDE 10 MG/ML
INJECTION, SOLUTION INTRAVENOUS AS NEEDED
Status: DISCONTINUED | OUTPATIENT
Start: 2019-11-03 | End: 2019-11-03 | Stop reason: HOSPADM

## 2019-11-03 RX ORDER — DEXAMETHASONE SODIUM PHOSPHATE 4 MG/ML
INJECTION, SOLUTION INTRA-ARTICULAR; INTRALESIONAL; INTRAMUSCULAR; INTRAVENOUS; SOFT TISSUE AS NEEDED
Status: DISCONTINUED | OUTPATIENT
Start: 2019-11-03 | End: 2019-11-03 | Stop reason: HOSPADM

## 2019-11-03 RX ORDER — CEFAZOLIN SODIUM/WATER 2 G/20 ML
2 SYRINGE (ML) INTRAVENOUS
Status: COMPLETED | OUTPATIENT
Start: 2019-11-03 | End: 2019-11-03

## 2019-11-03 RX ORDER — NEOSTIGMINE METHYLSULFATE 1 MG/ML
INJECTION INTRAVENOUS AS NEEDED
Status: DISCONTINUED | OUTPATIENT
Start: 2019-11-03 | End: 2019-11-03 | Stop reason: HOSPADM

## 2019-11-03 RX ORDER — ONDANSETRON 2 MG/ML
4 INJECTION INTRAMUSCULAR; INTRAVENOUS AS NEEDED
Status: DISCONTINUED | OUTPATIENT
Start: 2019-11-03 | End: 2019-11-03 | Stop reason: HOSPADM

## 2019-11-03 RX ORDER — KETAMINE HYDROCHLORIDE 10 MG/ML
INJECTION, SOLUTION INTRAMUSCULAR; INTRAVENOUS AS NEEDED
Status: DISCONTINUED | OUTPATIENT
Start: 2019-11-03 | End: 2019-11-03 | Stop reason: HOSPADM

## 2019-11-03 RX ORDER — OXYCODONE HYDROCHLORIDE 5 MG/1
10 TABLET ORAL
Status: DISCONTINUED | OUTPATIENT
Start: 2019-11-03 | End: 2019-11-05 | Stop reason: HOSPADM

## 2019-11-03 RX ORDER — ROPIVACAINE HYDROCHLORIDE 5 MG/ML
30 INJECTION, SOLUTION EPIDURAL; INFILTRATION; PERINEURAL AS NEEDED
Status: DISCONTINUED | OUTPATIENT
Start: 2019-11-03 | End: 2019-11-03 | Stop reason: HOSPADM

## 2019-11-03 RX ORDER — LIDOCAINE HYDROCHLORIDE 20 MG/ML
INJECTION, SOLUTION EPIDURAL; INFILTRATION; INTRACAUDAL; PERINEURAL AS NEEDED
Status: DISCONTINUED | OUTPATIENT
Start: 2019-11-03 | End: 2019-11-03 | Stop reason: HOSPADM

## 2019-11-03 RX ORDER — FENTANYL CITRATE 50 UG/ML
25 INJECTION, SOLUTION INTRAMUSCULAR; INTRAVENOUS
Status: DISCONTINUED | OUTPATIENT
Start: 2019-11-03 | End: 2019-11-03 | Stop reason: HOSPADM

## 2019-11-03 RX ORDER — ACETAMINOPHEN 325 MG/1
650 TABLET ORAL ONCE
Status: DISCONTINUED | OUTPATIENT
Start: 2019-11-03 | End: 2019-11-03

## 2019-11-03 RX ORDER — HYDROMORPHONE HYDROCHLORIDE 1 MG/ML
0.2 INJECTION, SOLUTION INTRAMUSCULAR; INTRAVENOUS; SUBCUTANEOUS
Status: DISCONTINUED | OUTPATIENT
Start: 2019-11-03 | End: 2019-11-03 | Stop reason: HOSPADM

## 2019-11-03 RX ORDER — AMOXICILLIN 250 MG
1 CAPSULE ORAL 2 TIMES DAILY
Status: DISCONTINUED | OUTPATIENT
Start: 2019-11-03 | End: 2019-11-05 | Stop reason: HOSPADM

## 2019-11-03 RX ORDER — MORPHINE SULFATE 10 MG/ML
2 INJECTION, SOLUTION INTRAMUSCULAR; INTRAVENOUS
Status: DISCONTINUED | OUTPATIENT
Start: 2019-11-03 | End: 2019-11-03 | Stop reason: HOSPADM

## 2019-11-03 RX ORDER — ONDANSETRON 2 MG/ML
INJECTION INTRAMUSCULAR; INTRAVENOUS AS NEEDED
Status: DISCONTINUED | OUTPATIENT
Start: 2019-11-03 | End: 2019-11-03 | Stop reason: HOSPADM

## 2019-11-03 RX ORDER — PHENYLEPHRINE HCL IN 0.9% NACL 0.4MG/10ML
SYRINGE (ML) INTRAVENOUS AS NEEDED
Status: DISCONTINUED | OUTPATIENT
Start: 2019-11-03 | End: 2019-11-03 | Stop reason: HOSPADM

## 2019-11-03 RX ORDER — SODIUM CHLORIDE 9 MG/ML
125 INJECTION, SOLUTION INTRAVENOUS CONTINUOUS
Status: DISPENSED | OUTPATIENT
Start: 2019-11-03 | End: 2019-11-04

## 2019-11-03 RX ORDER — SODIUM CHLORIDE 0.9 % (FLUSH) 0.9 %
5-40 SYRINGE (ML) INJECTION EVERY 8 HOURS
Status: DISCONTINUED | OUTPATIENT
Start: 2019-11-03 | End: 2019-11-05 | Stop reason: HOSPADM

## 2019-11-03 RX ADMIN — GLYCOPYRROLATE 0.4 MG: 0.2 INJECTION, SOLUTION INTRAMUSCULAR; INTRAVENOUS at 09:54

## 2019-11-03 RX ADMIN — FENTANYL CITRATE 50 MCG: 50 INJECTION, SOLUTION INTRAMUSCULAR; INTRAVENOUS at 09:59

## 2019-11-03 RX ADMIN — SODIUM CHLORIDE: 900 INJECTION, SOLUTION INTRAVENOUS at 08:20

## 2019-11-03 RX ADMIN — Medication 80 MCG: at 08:21

## 2019-11-03 RX ADMIN — KETAMINE HYDROCHLORIDE 50 MG: 10 INJECTION, SOLUTION INTRAMUSCULAR; INTRAVENOUS at 08:07

## 2019-11-03 RX ADMIN — OXYCODONE HYDROCHLORIDE 10 MG: 5 TABLET ORAL at 21:27

## 2019-11-03 RX ADMIN — ACETAMINOPHEN 650 MG: 325 TABLET, FILM COATED ORAL at 07:54

## 2019-11-03 RX ADMIN — FENTANYL CITRATE 50 MCG: 50 INJECTION, SOLUTION INTRAMUSCULAR; INTRAVENOUS at 09:36

## 2019-11-03 RX ADMIN — LIDOCAINE HYDROCHLORIDE 100 MG: 20 INJECTION, SOLUTION EPIDURAL; INFILTRATION; INTRACAUDAL; PERINEURAL at 08:07

## 2019-11-03 RX ADMIN — SUCCINYLCHOLINE CHLORIDE 140 MG: 20 INJECTION, SOLUTION INTRAMUSCULAR; INTRAVENOUS at 08:07

## 2019-11-03 RX ADMIN — Medication 10 ML: at 21:28

## 2019-11-03 RX ADMIN — GLYCOPYRROLATE 0.2 MG: 0.2 INJECTION, SOLUTION INTRAMUSCULAR; INTRAVENOUS at 08:01

## 2019-11-03 RX ADMIN — FENTANYL CITRATE 50 MCG: 50 INJECTION, SOLUTION INTRAMUSCULAR; INTRAVENOUS at 08:37

## 2019-11-03 RX ADMIN — SODIUM CHLORIDE, POTASSIUM CHLORIDE, SODIUM LACTATE AND CALCIUM CHLORIDE: 600; 310; 30; 20 INJECTION, SOLUTION INTRAVENOUS at 08:01

## 2019-11-03 RX ADMIN — PRAVASTATIN SODIUM 40 MG: 40 TABLET ORAL at 21:27

## 2019-11-03 RX ADMIN — Medication 2 G: at 16:10

## 2019-11-03 RX ADMIN — ACETAMINOPHEN 650 MG: 325 TABLET, FILM COATED ORAL at 21:27

## 2019-11-03 RX ADMIN — HEPARIN SODIUM 5000 UNITS: 5000 INJECTION INTRAVENOUS; SUBCUTANEOUS at 16:10

## 2019-11-03 RX ADMIN — Medication 2 G: at 08:11

## 2019-11-03 RX ADMIN — FENTANYL CITRATE 100 MCG: 50 INJECTION, SOLUTION INTRAMUSCULAR; INTRAVENOUS at 08:07

## 2019-11-03 RX ADMIN — ONDANSETRON HYDROCHLORIDE 4 MG: 2 INJECTION, SOLUTION INTRAMUSCULAR; INTRAVENOUS at 09:32

## 2019-11-03 RX ADMIN — Medication 10 ML: at 14:16

## 2019-11-03 RX ADMIN — MAGNESIUM OXIDE TAB 400 MG (241.3 MG ELEMENTAL MG) 400 MG: 400 (241.3 MG) TAB at 21:27

## 2019-11-03 RX ADMIN — ROCURONIUM BROMIDE 10 MG: 10 SOLUTION INTRAVENOUS at 08:07

## 2019-11-03 RX ADMIN — NEOSTIGMINE METHYLSULFATE 3 MG: 1 INJECTION, SOLUTION INTRAMUSCULAR; INTRAVENOUS; SUBCUTANEOUS at 09:54

## 2019-11-03 RX ADMIN — ACETAMINOPHEN 650 MG: 325 TABLET, FILM COATED ORAL at 14:16

## 2019-11-03 RX ADMIN — PHENOL 1 SPRAY: 1.5 LIQUID ORAL at 16:09

## 2019-11-03 RX ADMIN — DEXAMETHASONE SODIUM PHOSPHATE 8 MG: 4 INJECTION, SOLUTION INTRAMUSCULAR; INTRAVENOUS at 08:38

## 2019-11-03 RX ADMIN — PHENYLEPHRINE HYDROCHLORIDE 20 MCG/MIN: 10 INJECTION INTRAVENOUS at 08:30

## 2019-11-03 RX ADMIN — DEXMEDETOMIDINE HYDROCHLORIDE 20 MCG: 100 INJECTION, SOLUTION, CONCENTRATE INTRAVENOUS at 08:01

## 2019-11-03 RX ADMIN — ONDANSETRON 4 MG: 2 INJECTION INTRAMUSCULAR; INTRAVENOUS at 12:43

## 2019-11-03 RX ADMIN — AMITRIPTYLINE HYDROCHLORIDE 10 MG: 10 TABLET, FILM COATED ORAL at 21:27

## 2019-11-03 RX ADMIN — PROPOFOL 100 MG: 10 INJECTION, EMULSION INTRAVENOUS at 08:07

## 2019-11-03 RX ADMIN — ROCURONIUM BROMIDE 20 MG: 10 SOLUTION INTRAVENOUS at 08:12

## 2019-11-03 RX ADMIN — Medication 4 MG: at 09:52

## 2019-11-03 RX ADMIN — DOCUSATE SODIUM AND SENNOSIDES 1 TABLET: 50; 8.6 TABLET, FILM COATED ORAL at 17:47

## 2019-11-03 NOTE — PROGRESS NOTES
Hospitalist Progress Note  Agustina Garcia MD  Answering service: [phone] from in house phone      Date of Service:  11/3/2019  NAME:  Obey Flynn  :  1975  MRN:  [id number]    Admission Summary:   44F recent C-spine surgery. P/w R side weakness. Interval history / Subjective:   Patient seen and examined at bedside, feels well, just back from C- spine surgery, pain reasonably controlled. Already noticed recovery in RUE movement. Assessment & Plan:     #. R side weakness: neck down to RLE  - CT head: NAD, MRI Brain: NAAD, empty sella. MRI C- spine pending, TTE preserved ef.  - PT/OT, NeuroSurgery following, s/p C-spine surgery for mild compression on chord 11/3    #. HTN: chronic, stable, Home regimen, Monitor    Code status: Full  DVT prophylaxis: SCDs  Care Plan discussed with: Patient/Family and Nurse  Disposition: TBD     Hospital Problems  Date Reviewed: 2019          Codes Class Noted POA    Weakness ICD-10-CM: R53.1  ICD-9-CM: 780.79  2019 Unknown            Review of Systems:   Pertinent items are mentioned in interval history. Vital Signs:    Last 24hrs VS reviewed since prior progress note. Most recent are:  Visit Vitals  /83   Pulse 83   Temp 97.7 °F (36.5 °C)   Resp 14   Ht 5' 4\" (1.626 m)   Wt 83 kg (182 lb 15.7 oz)   SpO2 97%   BMI 31.41 kg/m²         Intake/Output Summary (Last 24 hours) at 11/3/2019 1228  Last data filed at 11/3/2019 1200  Gross per 24 hour   Intake 893 ml   Output 585 ml   Net 308 ml        Physical Examination:   General:  Alert, oriented, No acute distress  Resp:  No accessory muscle use, Good AE, no wheezes, no rhonchi  Abd:  Soft, non-tender, non-distended  Extremities:  No cyanosis or clubbing, no significant edema  Neuro:  Grossly normal, no focal neuro deficits, follows commands, Rside = L side  Psych:  Good insight, AAOx3, not agitated.     Data Review:    Review and/or order of clinical lab test  Review and/or order of tests in the radiology section of CPT  Review and/or order of tests in the medicine section of CPT  Labs:     Recent Labs     11/02/19  0431 11/01/19  1233   WBC 17.8* 14.0*   HGB 12.5 12.7   HCT 39.8 39.7    280     Recent Labs     11/01/19  1233      K 4.1      CO2 26   BUN 14   CREA 1.08*   *   CA 9.3     Recent Labs     11/01/19  1233   SGOT 10*   ALT 15   AP 48   TBILI 0.2   TP 7.9   ALB 3.8   GLOB 4.1*     No results for input(s): INR, PTP, APTT, INREXT, INREXT in the last 72 hours. No results for input(s): FE, TIBC, PSAT, FERR in the last 72 hours. No results found for: FOL, RBCF   No results for input(s): PH, PCO2, PO2 in the last 72 hours. No results for input(s): CPK, CKNDX, TROIQ in the last 72 hours.     No lab exists for component: CPKMB  Lab Results   Component Value Date/Time    Cholesterol, total 236 (H) 11/02/2019 04:31 AM    HDL Cholesterol 67 11/02/2019 04:31 AM    LDL, calculated 134.8 (H) 11/02/2019 04:31 AM    Triglyceride 171 (H) 11/02/2019 04:31 AM    CHOL/HDL Ratio 3.5 11/02/2019 04:31 AM     Lab Results   Component Value Date/Time    Glucose (POC) 102 (H) 11/02/2019 09:49 PM    Glucose (POC) 101 (H) 11/02/2019 04:32 PM    Glucose (POC) 115 (H) 11/02/2019 11:21 AM    Glucose (POC) 106 (H) 11/02/2019 09:40 AM    Glucose (POC) 99 11/01/2019 11:08 PM     Lab Results   Component Value Date/Time    Color YELLOW/STRAW 08/15/2017 02:10 PM    Appearance CLOUDY (A) 08/15/2017 02:10 PM    Specific gravity 1.006 08/15/2017 02:10 PM    pH (UA) 6.5 08/15/2017 02:10 PM    Protein NEGATIVE  08/15/2017 02:10 PM    Glucose NEGATIVE  08/15/2017 02:10 PM    Ketone NEGATIVE  08/15/2017 02:10 PM    Bilirubin NEGATIVE  08/15/2017 02:10 PM    Urobilinogen 0.2 08/15/2017 02:10 PM    Nitrites NEGATIVE  08/15/2017 02:10 PM    Leukocyte Esterase NEGATIVE  08/15/2017 02:10 PM     Medications Reviewed:     Current Facility-Administered Medications   Medication Dose Route Frequency    0.9% sodium chloride infusion  125 mL/hr IntraVENous CONTINUOUS    sodium chloride (NS) flush 5-40 mL  5-40 mL IntraVENous PRN    acetaminophen (TYLENOL) tablet 650 mg  650 mg Oral Q6H    amitriptyline (ELAVIL) tablet 10 mg  10 mg Oral QHS    cyclobenzaprine (FLEXERIL) tablet 10 mg  10 mg Oral BID    magnesium oxide (MAG-OX) tablet 400 mg  400 mg Oral QHS    acetaminophen (TYLENOL) tablet 650 mg  650 mg Oral Q4H PRN    Or    acetaminophen (TYLENOL) solution 650 mg  650 mg Per NG tube Q4H PRN    Or    acetaminophen (TYLENOL) suppository 650 mg  650 mg Rectal Q4H PRN    aspirin chewable tablet 81 mg  81 mg Oral DAILY    ondansetron (ZOFRAN) injection 4 mg  4 mg IntraVENous Q6H PRN    pravastatin (PRAVACHOL) tablet 40 mg  40 mg Oral QHS    heparin (porcine) injection 5,000 Units  5,000 Units SubCUTAneous Q8H    pantoprazole (PROTONIX) tablet 40 mg  40 mg Oral ACB   ______________________________________________________________________  EXPECTED LENGTH OF STAY: - - -  ACTUAL LENGTH OF STAY:          0               Louis Ruvalcaba MD

## 2019-11-03 NOTE — PERIOP NOTES
TRANSFER - OUT REPORT:    Verbal report given to Mere Agata on Damion Julian  being transferred to [facility] for routine post - op       Report consisted of patients Situation, Background, Assessment and   Recommendations(SBAR). Time Pre op antibiotic FPRGA:[id number]  Anesthesia Stop time: 6991  Gutierrez Present on Transfer to floor:no  Order for Gutierrez on Chart:no  Discharge Prescriptions with Chart:no    Information from the following report(s) SBAR, OR Summary, Intake/Output and MAR was reviewed with the receiving nurse. Opportunity for questions and clarification was provided. Is the patient on 02? NO       L/Min        Other     Is the patient on a monitor? NO    Is the nurse transporting with the patient? YES    Surgical Waiting Area notified of patient's transfer from PACU? YES      The following personal items collected during your admission accompanied patient upon transfer:   Dental Appliance: Dental Appliances: None  Vision: Visual Aid: Glasses  Hearing Aid:    Jewelry: Jewelry: None  Clothing: Clothing: None  Other Valuables:  Other Valuables: None  Valuables sent to safe:

## 2019-11-03 NOTE — PROGRESS NOTES
Problem: Pain  Goal: *Control of Pain  Outcome: Progressing Towards Goal     Problem: Falls - Risk of  Goal: *Absence of Falls  Description  Document Genesis Aline Fall Risk and appropriate interventions in the flowsheet.   Outcome: Progressing Towards Goal  Note:   Fall Risk Interventions:  Mobility Interventions: Communicate number of staff needed for ambulation/transfer, Patient to call before getting OOB         Medication Interventions: Evaluate medications/consider consulting pharmacy, Patient to call before getting OOB, Teach patient to arise slowly    Elimination Interventions: Call light in reach, Patient to call for help with toileting needs, Stay With Me (per policy), Toileting schedule/hourly rounds              Problem: Infection - Risk of, Surgical Site Infection  Goal: *Absence of surgical site infection signs and symptoms  Outcome: Progressing Towards Goal     Problem: Pain  Goal: *Control of Pain  Outcome: Progressing Towards Goal

## 2019-11-03 NOTE — PROGRESS NOTES
Occupational Therapy 9636 -   11.03.2019    Orders acknowledged and chart reviewed in prep for OT evaluation. [facility]. Will f/u POD #1 for OT evaluation. PLOF: patient IND and living with 2 daughters (16 years, 21 years). JonathanJohnson IGNACIO Castillo MS, OTR/L

## 2019-11-03 NOTE — ANESTHESIA POSTPROCEDURE EVALUATION
Post-Anesthesia Evaluation and Assessment    Patient: Obey Flynn MRN: [id number]  SSN: [id number]    YOB: 1975  Age: 53 y.o. Sex: female      I have evaluated the patient and they are stable and ready for discharge from the PACU. Cardiovascular Function/Vital Signs  Visit Vitals  /85   Pulse 82   Temp 36.6 °C (97.9 °F)   Resp 12   Ht 5' 4\" (1.626 m)   Wt 83 kg (182 lb 15.7 oz)   SpO2 98%   BMI 31.41 kg/m²       Patient is status post General anesthesia for Procedure(s):  SPINE ANTERIOR CERVICAL  4,5 FUSION WITH INSTRUMENTATION. Nausea/Vomiting: None    Postoperative hydration reviewed and adequate. Pain:  Pain Scale 1: Numeric (0 - 10) (11/03/19 1039)  Pain Intensity 1: 0 (11/03/19 1039)   Managed    Neurological Status:   Neuro (WDL): Exceptions to WDL (11/03/19 1039)  Neuro  Neurologic State: Drowsy (11/03/19 1039)  Orientation Level: Oriented X4 (11/02/19 2000)  Cognition: Follows commands (11/02/19 2000)  Speech: Clear (11/02/19 2000)  Assessment L Pupil: Brisk;Round (11/02/19 2000)  Size L Pupil (mm): 3 (11/02/19 2000)  Assessment R Pupil: Brisk;Round (11/02/19 2000)  Size R Pupil (mm): 3 (11/02/19 2000)  LUE Motor Response: Purposeful (11/02/19 2000)  LLE Motor Response: Purposeful (11/02/19 2000)  RUE Motor Response: Purposeful (11/02/19 2000)  RLE Motor Response: Purposeful (11/02/19 2000)   At baseline    Mental Status, Level of Consciousness: Alert and  oriented to person, place, and time    Pulmonary Status:   O2 Device: Room air (11/03/19 1019)   Adequate oxygenation and airway patent    Complications related to anesthesia: None    Post-anesthesia assessment completed. No concerns    Signed By: Abraham Pritchett MD     November 3, 2019              Procedure(s):  SPINE ANTERIOR CERVICAL  4,5 FUSION WITH INSTRUMENTATION.     general    <BSHSIANPOST>    Vitals Value Taken Time   /85 11/3/2019 11:15 AM   Temp 36.6 °C (97.9 °F) 11/3/2019 10:18 AM   Pulse 80 11/3/2019 11:22 AM   Resp 18 11/3/2019 11:22 AM   SpO2 98 % 11/3/2019 11:22 AM   Vitals shown include unvalidated device data.

## 2019-11-03 NOTE — PROGRESS NOTES
Physical Therapy    New PT order acknowledged, chart reviewed. Will continue to follow and complete PT evaluation POD#1.      Thank you,  Christiane Houston, PT, MPT

## 2019-11-03 NOTE — ANESTHESIA PREPROCEDURE EVALUATION
Relevant Problems   No relevant active problems       Anesthetic History               Review of Systems / Medical History  Patient summary reviewed, nursing notes reviewed and pertinent labs reviewed    Pulmonary  Within defined limits                 Neuro/Psych   Within defined limits           Cardiovascular    Hypertension: well controlled              Exercise tolerance: >4 METS     GI/Hepatic/Renal     GERD: well controlled           Endo/Other        Arthritis     Other Findings              Physical Exam    Airway  Mallampati: II  TM Distance: > 6 cm  Neck ROM: normal range of motion   Mouth opening: Normal     Cardiovascular  Regular rate and rhythm,  S1 and S2 normal,  no murmur, click, rub, or gallop             Dental  No notable dental hx       Pulmonary  Breath sounds clear to auscultation               Abdominal  GI exam deferred       Other Findings            Anesthetic Plan    ASA: 2  Anesthesia type: general          Induction: Intravenous  Anesthetic plan and risks discussed with: Patient

## 2019-11-03 NOTE — PROGRESS NOTES
Awake alert feels very well  No pain  Arm pain resolved on right  No headache    CUNN[place]AM well afeb VSS  Excellent progress  Continue post op care

## 2019-11-03 NOTE — BRIEF OP NOTE
BRIEF OPERATIVE NOTE    Date of Procedure: 11/3/2019   Preoperative Diagnosis: Disc herniation with cord compression at C45  Postoperative Diagnosis: Disc herniation with cord compression at C45    Procedure(s):  SPINE ANTERIOR CERVICAL  4,5 FUSION WITH INSTRUMENTATION  Surgeon(s) and Role:     * Alverto Anaya MD - Primary             Surgical Staff:  Circ-1: Sowmya Lloyd RN  Circ-2: Tyrone Gore RN  Radiology Technician: Nubia Carreon  Scrub Tech-1: Shantel Hitch  Surg Asst-1: Sharon Locket  Event Time In Time Out   Incision Start 11/03/2019 0839    Incision Close 11/03/2019 1001      Anesthesia: General   Estimated Blood Loss: none  Specimens:   ID Type Source Tests Collected by Time Destination   1 : C4-5 Disc herniation Fresh Spine  Alverto Anaya MD 11/3/2019 8645 Pathology      Findings: large free fragment disc herniation behid vertebral bodies at C4,5 on right, that when removed, a leak of spinal fluid behind it was noted laterally over the region of the nerve root. It stopped with a small piece of gelfoam and floseal place over it, not covering the midline dura or left side  Complications: as above  Implants:   Implant Name Type Inv.  Item Serial No.  Lot No. LRB No. Used Action   BONE MTRX SUB CLLGN SPNG 2ML -- CONDUCT 1/EA - SNA  BONE MTRX SUB CLLGN SPNG 2ML -- CONDUCT 1/EA NA [employer] [id number] N/A 1 Implanted   SPACER SPNE CAGE 7D [id number] -- COALITION - SNA  SPACER SPNE CAGE 7D [id number] -- COALITION NA [employer] NA N/A 1 Implanted   SCR SPNE BNE CHAPIS SD 3.6X12MM -- COALITION - SNA  SCR SPNE BNE CHAPIS SD 3.6X12MM -- COALITION NA [employer] NA N/A 2 Implanted

## 2019-11-03 NOTE — PROGRESS NOTES
Physical Therapy    Orders acknowledged, chart reviewed. PT evaluation deferred due to pt in OR for cervical fusion. Will continue to follow.     Jorge Gardiner, PT, MPT

## 2019-11-03 NOTE — PERIOP NOTES
SURGIFOAM WAS GIVEN TO THE STERILE FIELD TO BE USED BY MD DURING PROCEDURE  REF: Beck Vang   LOT: [id number]  EXP: 08-

## 2019-11-04 LAB
GLUCOSE BLD STRIP.AUTO-MCNC: 117 MG/DL (ref 65–100)
SERVICE CMNT-IMP: ABNORMAL

## 2019-11-04 PROCEDURE — 65660000000 HC RM CCU STEPDOWN

## 2019-11-04 PROCEDURE — 74011250636 HC RX REV CODE- 250/636: Performed by: NEUROLOGICAL SURGERY

## 2019-11-04 PROCEDURE — 82962 GLUCOSE BLOOD TEST: CPT

## 2019-11-04 PROCEDURE — 97161 PT EVAL LOW COMPLEX 20 MIN: CPT

## 2019-11-04 PROCEDURE — 97165 OT EVAL LOW COMPLEX 30 MIN: CPT

## 2019-11-04 PROCEDURE — 74011250637 HC RX REV CODE- 250/637: Performed by: NEUROLOGICAL SURGERY

## 2019-11-04 PROCEDURE — 97116 GAIT TRAINING THERAPY: CPT

## 2019-11-04 PROCEDURE — 74011250637 HC RX REV CODE- 250/637: Performed by: FAMILY MEDICINE

## 2019-11-04 PROCEDURE — 97535 SELF CARE MNGMENT TRAINING: CPT

## 2019-11-04 PROCEDURE — 92610 EVALUATE SWALLOWING FUNCTION: CPT

## 2019-11-04 RX ADMIN — Medication 2 G: at 01:48

## 2019-11-04 RX ADMIN — ACETAMINOPHEN 650 MG: 325 TABLET, FILM COATED ORAL at 01:39

## 2019-11-04 RX ADMIN — Medication 10 ML: at 21:23

## 2019-11-04 RX ADMIN — CYCLOBENZAPRINE HYDROCHLORIDE 10 MG: 10 TABLET, FILM COATED ORAL at 17:41

## 2019-11-04 RX ADMIN — DOCUSATE SODIUM AND SENNOSIDES 1 TABLET: 50; 8.6 TABLET, FILM COATED ORAL at 08:40

## 2019-11-04 RX ADMIN — ACETAMINOPHEN 650 MG: 325 TABLET, FILM COATED ORAL at 14:07

## 2019-11-04 RX ADMIN — ACETAMINOPHEN 650 MG: 325 TABLET, FILM COATED ORAL at 21:20

## 2019-11-04 RX ADMIN — OXYCODONE HYDROCHLORIDE 10 MG: 5 TABLET ORAL at 06:50

## 2019-11-04 RX ADMIN — AMITRIPTYLINE HYDROCHLORIDE 10 MG: 10 TABLET, FILM COATED ORAL at 21:21

## 2019-11-04 RX ADMIN — CYCLOBENZAPRINE HYDROCHLORIDE 10 MG: 10 TABLET, FILM COATED ORAL at 08:40

## 2019-11-04 RX ADMIN — PANTOPRAZOLE SODIUM 40 MG: 40 TABLET, DELAYED RELEASE ORAL at 06:50

## 2019-11-04 RX ADMIN — Medication 10 ML: at 06:50

## 2019-11-04 RX ADMIN — ACETAMINOPHEN 650 MG: 325 TABLET, FILM COATED ORAL at 08:40

## 2019-11-04 RX ADMIN — DOCUSATE SODIUM AND SENNOSIDES 1 TABLET: 50; 8.6 TABLET, FILM COATED ORAL at 17:41

## 2019-11-04 RX ADMIN — MAGNESIUM OXIDE TAB 400 MG (241.3 MG ELEMENTAL MG) 400 MG: 400 (241.3 MG) TAB at 21:22

## 2019-11-04 RX ADMIN — OXYCODONE HYDROCHLORIDE 10 MG: 5 TABLET ORAL at 21:23

## 2019-11-04 RX ADMIN — Medication 10 ML: at 14:08

## 2019-11-04 RX ADMIN — PRAVASTATIN SODIUM 40 MG: 40 TABLET ORAL at 21:22

## 2019-11-04 NOTE — OP NOTES
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 PROCEDURE PERFORMED:  Anterior cervical diskectomy and fusion, C4-5 with biomechanical zero profile implant graft Globus spine system and Conduct matrix sponge. SURGEON:  Lenore Hooper MD    ASSISTANT:  [facility].    ANESTHESIA:  General.    COMPLICATIONS:  None. SPECIMENS REMOVED:  Disk fragment. IMPLANTS:  Zero profile graft and conduct matrix sponge    ESTIMATED BLOOD LOSS:  Minimal.    OPERATIVE INDICATIONS:  This 41-year-old woman had undergone an anterior cervical diskectomy and fusion at C5-6, C6-7 some years ago and presented to the hospital with significant weakness in the right arm and right leg. This improved significantly in the course of the first day or so of her hospitalization, but imaging studies on MRI revealed a large fragment disk herniation at the level above the fusion at C4-5. She had persistent and intractable pain in the neck and right arm. After discussing risks, benefits and alternatives, it was recommended that she undergo anterior cervical diskectomy and fusion and she agreed to proceed. She was taken to the operating room where she was induced under general endotracheal anesthesia, placed on the operating table in the supine position. A small shoulder roll placed beneath both shoulders and the anterior cervical region scrubbed, prepped and draped in usual sterile fashion. Previous incision had been made on the patient's left side below the level of the cricoid cartilage, so I chose to put the incision on the right side so as to avoid any of the scar tissue from the previous operation in the superficial tissue.   It also was placed higher at the level of the thyroid cartilage to access the C4-5 interspace level. After sterile scrub, prep and drape and appropriate antibiotics administered, first appropriate antibiotics administered and sequential stockings applied for DVT prophylaxis and a time-out performed to identify the correct patient and procedure. A small incision on the right side was made and avascular plane was developed down to the anterior cervical spine without difficulty. There was some scar tissue over the C4-5 interspace that was able to be removed with a Kittner and the superior portion of the plate came into view and using bipolar electrocautery to remove some of the scar tissue over it and I was able to visualize the superior portion of the plate, which was just at the level of the C4-5 disk space inferiorly and the 2 screws, securing in place superiorly. There was a hole in the plate through which I could put a [place] post into C5 and this was done without difficulty after placing self-retaining retractors into the edges of the longus colli muscles. A smooth blade was placed medially and another laterally from  the  system was placed in the longus colli muscle laterally. Another Clearence Audrey post was placed in the C4 vertebral body above an anterior osteophyte and the interspace was distracted. The interspace under loupe magnification was then incised. An x-ray was obtained to confirm position after a blunted needle was placed in the disk space at C4-5. The interspace was distracted under loupe magnification, incised with a #15 blade scalpel. Anterior osteophytes removed with a 2-mm Kerrison rongeur. Disk material was removed from within the disk space with curettes and pituitary rongeurs.   Posterior osteophytes were removed with a 1-mm Kerrison rongeur, working first from the patient's left side and sequentially to the right side and multiple fragments of disk material were retrieved from behind the vertebral body. Then, using a micro pituitary rongeur, there was a large fragment of disk material that had migrated laterally over the nerve root corresponding to the imaging studies. I grasped it with a micro pituitary and immediately as I pulled it out. There was an egress of spinal fluid, although there was no obvious rent in the dura, but clearly there was a clear spinal fluid coming from that area. I was able to cover with a small piece of Gelfoam and some FloSeal and with a little gentle pressure with a small 0.5 x 0.5 cottonoid. The flow of spinal fluid stopped. I watched it for several minutes, no longer saw any flow of spinal fluid. The edges of the vertebral bodies were scraped with a curette in the usual fashion and then, a 6 mm high zero profile graft from the DoubleUpus spine system with a small Conduct matrix sponge placed from the center of  it, was placed in the interspace and countersunk 1-2 mm. 12-mm screws were placed in the graft and secured to the vertebral body above and below without any complication. There were locked in place. An x-ray was obtained to confirm position. The incision was inspected again and after irrigating copiously with antibiotic irrigation, again I did not see any further leak of spinal fluid coming from the interspace, so the self-retaining retractors were removed. The incision was inspected and the platysma was reapproximated with 3-0 interrupted inverted Vicryl sutures as was the subcutaneous tissue and a running 4-0 Monocryl was used to close the subcuticular layer and Dermabond applied and a soft collar applied. The needle, sponge, and instrument count were correct at the end of the procedure.         Cherie Spivey MD      [id number]  D:  11/03/2019 10:28  T:  11/03/2019 21:49  JOB #:  [id number]  CC:  Jacey Hutton MD

## 2019-11-04 NOTE — PROGRESS NOTES
Neurosurgery Progress Note  [facility] [place]  [phone]        Admit Date: 2019   LOS: 1 day        Daily Progress Note: 2019    POD:1 Day Post-Op    S/P: Procedure(s):  SPINE ANTERIOR CERVICAL  4,5 FUSION WITH INSTRUMENTATION    Subjective: The patient presented to the ER on Friday with right right sided hemiparesis and numbness that progressed over a 24 hour period. She has a history of an ACDF at C5-6. She has been having some neck pain over the past few months and underwent a steroid injection on 10/16/19. She called her pain specialist who recommended that she come to the ER for evaluation as they thought it was too far out from the injection for that to be the cause. She underwent work-up for possible stroke and also for possible herniated disc. Her MRI of her brain was negative. On MRI of her cervical spine, she was found to have a large disc extrusion at the C4-5 level and indentation of the cervical cord. She underwent a C4-5 ACDF with Dr. Liana Aaron on  after discussing risks, benefits, and alternatives. Her weakness on the right side has definitely improved post-op. Denies numbness, tingling, chest pain, leg pain, nausea, vomiting, difficulty swallowing, headache, and dyspnea.        Objective:     Vital signs  Temp (24hrs), Av.2 °F (36.8 °C), Min:98 °F (36.7 °C), Max:98.4 °F (36.9 °C)   701 -  190  In: 240 [P.O.:240]  Out: -   1901 -  0700  In: 257 [I.V.:893]  Out: 585 [Urine:550]    Visit Vitals  /87 (BP 1 Location: Right arm, BP Patient Position: At rest)   Pulse 95   Temp 98 °F (36.7 °C)   Resp 20   Ht 5' 4\" (1.626 m)   Wt 85.5 kg (188 lb 7.9 oz)   LMP 10/17/2019   SpO2 98%   BMI 32.35 kg/m²      O2 Device: Room air     Pain control  Pain Assessment  Pain Scale 1: Numeric (0 - 10)  Pain Intensity 1: 0  Pain Onset 1: wednesday  Pain Location 1: Neck  Pain Orientation 1: Lateral  Pain Description 1: Aching  Pain Intervention(s) 1: MD notified (comment)    PT/OT  Gait     Gait  Base of Support: Widened  Speed/Radha: Pace decreased (<100 feet/min)  Step Length: Right shortened, Left shortened  Gait Abnormalities: Decreased step clearance  Ambulation - Level of Assistance: Stand-by assistance, Contact guard assistance  Distance (ft): 300 Feet (ft)  Assistive Device: Gait belt(soft neck collar)           Physical Exam:  Gen:NAD. Neuro: A&Ox3. Follows commands. Speech clear. Affect normal.. [place] spontanouesly. Strength 4+/5 RUE/RLE, 5/5 LUE/LLE  Gait deferred. Skin: Incision C/D/I with dermabond in place    24 hour results:    No results found for this or any previous visit (from the past 24 hour(s)). Assessment:     Active Problems:    Weakness (11/1/2019)      Compression of spinal cord ([address] (11/3/2019)        Plan:   1. Cervical disc herniation with cord compression   - s/p ACDF C4/5 11/03   - PT/OT/Speech   - ADAT   - Soft collar for comfort   - d/c home likely tomorrow  2.  Right hemiparesis   - due to #1   - improving   - PT/OT    Activity: up ad sandra  DVT ppx: SCDs  Dispo: tbd    Plan d/w Dr. Clare Aquino, NP

## 2019-11-04 NOTE — PROGRESS NOTES
Hospitalist Progress Note  Lucas Goncalves MD  Answering service: [phone] from in house phone      Date of Service:  2019  NAME:  Elizabeth Villagran  :  1975  MRN:  [id number]    Admission Summary:   44F recent C-spine surgery. P/w R side weakness. Interval history / Subjective:   Patient seen and examined at bedside, feels well, no acute events, daughter visiting, likely dc tomorrow     Assessment & Plan:     #. R side weakness: neck down to RLE  - CT head: NAD, MRI Brain: NAAD, empty sella. MRI C- spine pending, TTE preserved ef.  - NeuroSurgery following, s/p C-spine surgery for mild compression on chord 11/3  - SLP/PT/OT, SLP want to re-evaluate swallowing tomorrow to fully clear for dc. #. HTN: chronic, stable, Home regimen, Monitor    Code status: Full  DVT prophylaxis: SCDs  Care Plan discussed with: Patient/Family and Nurse  Disposition: TBD     Hospital Problems  Date Reviewed: 2019          Codes Class Noted POA    Compression of spinal cord ([facility] [address] ICD-10-CM: G95.20  ICD-9-CM: 336.9  11/3/2019 Unknown        Weakness ICD-10-CM: R53.1  ICD-9-CM: 780.79  2019 Unknown            Review of Systems:   Pertinent items are mentioned in interval history. Vital Signs:    Last 24hrs VS reviewed since prior progress note.  Most recent are:  Visit Vitals  /87 (BP 1 Location: Right arm, BP Patient Position: At rest)   Pulse 95   Temp 98 °F (36.7 °C)   Resp 20   Ht 5' 4\" (1.626 m)   Wt 85.5 kg (188 lb 7.9 oz)   SpO2 98%   BMI 32.35 kg/m²         Intake/Output Summary (Last 24 hours) at 2019 1059  Last data filed at 2019 0800  Gross per 24 hour   Intake 333 ml   Output 550 ml   Net -217 ml        Physical Examination:   General:  Alert, oriented, No acute distress, Collar on Neck  Resp:  No accessory muscle use, Good AE, no wheezes, no rhonchi  Abd:  Soft, non-tender, non-distended  Extremities:  No cyanosis or clubbing, no significant edema  Neuro:  Grossly normal, no focal neuro deficits, follows commands, Rside = L side  Psych:  Good insight, AAOx3, not agitated. Data Review:    Review and/or order of clinical lab test  Review and/or order of tests in the radiology section of CPT  Review and/or order of tests in the medicine section of [facility]  Labs:     Recent Labs     11/02/19  0431 11/01/19  1233   WBC 17.8* 14.0*   HGB 12.5 12.7   HCT 39.8 39.7    280     Recent Labs     11/01/19  1233      K 4.1      CO2 26   BUN 14   CREA 1.08*   *   CA 9.3     Recent Labs     11/01/19  1233   SGOT 10*   ALT 15   AP 48   TBILI 0.2   TP 7.9   ALB 3.8   GLOB 4.1*     No results for input(s): INR, PTP, APTT, INREXT, INREXT in the last 72 hours. No results for input(s): FE, TIBC, PSAT, FERR in the last 72 hours. No results found for: FOL, RBCF   No results for input(s): PH, PCO2, PO2 in the last 72 hours. No results for input(s): CPK, CKNDX, TROIQ in the last 72 hours.     No lab exists for component: CPKMB  Lab Results   Component Value Date/Time    Cholesterol, total 236 (H) 11/02/2019 04:31 AM    HDL Cholesterol 67 11/02/2019 04:31 AM    LDL, calculated 134.8 (H) 11/02/2019 04:31 AM    Triglyceride 171 (H) 11/02/2019 04:31 AM    CHOL/HDL Ratio 3.5 11/02/2019 04:31 AM     Lab Results   Component Value Date/Time    Glucose (POC) 102 (H) 11/02/2019 09:49 PM    Glucose (POC) 101 (H) 11/02/2019 04:32 PM    Glucose (POC) 115 (H) 11/02/2019 11:21 AM    Glucose (POC) 106 (H) 11/02/2019 09:40 AM    Glucose (POC) 99 11/01/2019 11:08 PM     Lab Results   Component Value Date/Time    Color YELLOW/STRAW 08/15/2017 02:10 PM    Appearance CLOUDY (A) 08/15/2017 02:10 PM    Specific gravity 1.006 08/15/2017 02:10 PM    pH (UA) 6.5 08/15/2017 02:10 PM    Protein NEGATIVE  08/15/2017 02:10 PM    Glucose NEGATIVE  08/15/2017 02:10 PM    Ketone NEGATIVE  08/15/2017 02:10 PM    Bilirubin NEGATIVE  08/15/2017 02:10 PM Urobilinogen 0.2 08/15/2017 02:10 PM    Nitrites NEGATIVE  08/15/2017 02:10 PM    Leukocyte Esterase NEGATIVE  08/15/2017 02:10 PM     Medications Reviewed:     Current Facility-Administered Medications   Medication Dose Route Frequency    sodium chloride (NS) flush 5-40 mL  5-40 mL IntraVENous Q8H    sodium chloride (NS) flush 5-40 mL  5-40 mL IntraVENous PRN    acetaminophen (TYLENOL) tablet 650 mg  650 mg Oral Q6H    oxyCODONE IR (ROXICODONE) tablet 5 mg  5 mg Oral Q3H PRN    oxyCODONE IR (ROXICODONE) tablet 10 mg  10 mg Oral Q3H PRN    naloxone (NARCAN) injection 0.4 mg  0.4 mg IntraVENous PRN    ondansetron (ZOFRAN ODT) tablet 4 mg  4 mg Oral Q4H PRN    senna-docusate (PERICOLACE) 8.6-50 mg per tablet 1 Tab  1 Tab Oral BID    phenol throat spray (CHLORASEPTIC) 1 Spray  1 Spray Oral PRN    HYDROmorphone (PF) (DILAUDID) injection 0.5 mg  0.5 mg IntraVENous Q3H PRN    amitriptyline (ELAVIL) tablet 10 mg  10 mg Oral QHS    cyclobenzaprine (FLEXERIL) tablet 10 mg  10 mg Oral BID    magnesium oxide (MAG-OX) tablet 400 mg  400 mg Oral QHS    acetaminophen (TYLENOL) tablet 650 mg  650 mg Oral Q4H PRN    Or    acetaminophen (TYLENOL) solution 650 mg  650 mg Per NG tube Q4H PRN    Or    acetaminophen (TYLENOL) suppository 650 mg  650 mg Rectal Q4H PRN    ondansetron (ZOFRAN) injection 4 mg  4 mg IntraVENous Q6H PRN    pravastatin (PRAVACHOL) tablet 40 mg  40 mg Oral QHS    pantoprazole (PROTONIX) tablet 40 mg  40 mg Oral ACB   ______________________________________________________________________  EXPECTED LENGTH OF STAY: - - -  ACTUAL LENGTH OF STAY:          1               Zurdo Schroeder MD

## 2019-11-04 NOTE — PROGRESS NOTES
Awake alert  Feels well  She was virtually hemiplegic on the right at admission according to the pt  It was improving when I first saw her  At this time POD 1 she has no focal deficits [place] well  Incision clean and dry  Minimal pain  Voice strong  She should have PT see her, if they clear her she may go home and f/u in office

## 2019-11-04 NOTE — PROGRESS NOTES
Problem: Dysphagia (Adult)  Goal: *Acute Goals and Plan of Care (Insert Text)  Description  Speech Therapy Goals  Initiated 10/4/2019    1. Patient will tolerate GI soft diet and thin liquids without adverse effects within 7 days. 2.  Patient will tolerate a diet upgrade to regular diet without adverse effects within 7 days. Outcome: Progressing Towards Goal     SPEECH LANGUAGE PATHOLOGY BEDSIDE SWALLOW EVALUATION  Patient: Cintia Oliver (26 y.o. female)  Date: 11/4/2019  Primary Diagnosis: Weakness [R53.1]  Compression of spinal cord (HCC) [G95.20]  Procedure(s) (LRB):  SPINE ANTERIOR CERVICAL  4,5 FUSION WITH INSTRUMENTATION (N/A) 1 Day Post-Op   Precautions: n/a       ASSESSMENT :  Based on the objective data described below, the patient presents with mostly functional oropharyngeal phases of swallow on this date. Pt does report some difficulty with more solid food 2/2 globus sensation, but did not have difficulty with solid on this date. However, pt is s/p ACDF. POD 1. Research shows that peak preveterbral edema for ACDF typically occurs POD 2-3. Therefore, SLP will continue to follow to ensure pt continues to tolerate diet. Patient will benefit from skilled intervention to address the above impairments. Patients rehabilitation potential is considered to be Good  Factors which may influence rehabilitation potential include:   ? None noted  ? Mental ability/status  ? Medical condition  ? Home/family situation and support systems  ? Safety awareness  ? Pain tolerance/management  ? Other:      PLAN :  Recommendations and Planned Interventions:  --Soft solids/thin liquids  --will reassess tomorrow to ensure pt continues to tolerate diet POD 2-3    Frequency/Duration: Patient will be followed by speech-language pathology 4 times a week to address goals. Discharge Recommendations:  To Be Determined     SUBJECTIVE:   Patient stated,  \"Just seems to be a little more challenging to swallow, but I'm doing okay. OBJECTIVE:     Past Medical History:   Diagnosis Date    Dense breasts     GERD (gastroesophageal reflux disease)     Headache     Hypertension     Ill-defined condition     heart murmer    Nausea & vomiting     Sinus congestion      Past Surgical History:   Procedure Laterality Date    HX BACK SURGERY  2016    HX BUNIONECTOMY Bilateral     HX GYN  2002    D&C    HX OTHER SURGICAL  2016    sinus    US GUIDE ASP BREAST RT CYST W NDL Right 4 years ago ? Benign     Prior Level of Function/Home Situation:  Home Situation  Home Environment: Private residence  One/Two Story Residence: Two story  Living Alone: No  Support Systems: Child(smitha), Family member(s)  Patient Expects to be Discharged to[de-identified] Private residence  Current DME Used/Available at Home: None  Diet prior to admission: Regular diet/thin liquids  Current Diet:  Post-op GI soft/thin liquids  Cognitive and Communication Status:  Neurologic State: Alert, Eyes open spontaneously  Orientation Level: Oriented X4  Cognition: Appropriate decision making, Appropriate for age attention/concentration, Appropriate safety awareness  Perception: Appears intact  Perseveration: No perseveration noted  Safety/Judgement: Awareness of environment  Oral Assessment:  Oral Assessment  Labial: No impairment  Dentition: Intact  Oral Hygiene: oral mucosa moist and clear of secretions  Lingual: No impairment  Velum: No impairment  Mandible: No impairment  P.O. Trials:  Patient Position: upright in bed  Vocal quality prior to P.O.: No impairment  Consistency Presented: Thin liquid; Solid  How Presented: Self-fed/presented;Cup/sip;Straw     Bolus Acceptance: No impairment  Bolus Formation/Control: No impairment     Propulsion: No impairment  Oral Residue: None  Initiation of Swallow: No impairment  Laryngeal Elevation: Functional  Aspiration Signs/Symptoms: None  Pharyngeal Phase Characteristics: No impairment, issues, or problems              Oral Phase Severity: No impairment  Pharyngeal Phase Severity : No impairment    NOMS:   The NOMS functional outcome measure was used to quantify this patient's level of swallowing impairment. Based on the NOMS, the patient was determined to be at level 6 for swallow function       NOMS Swallowing Levels:  Level 1 (CN): NPO  Level 2 (CM): NPO but takes consistency in therapy  Level 3 (CL): Takes less than 50% of nutrition p.o. and continues with nonoral feedings; and/or safe with mod cues; and/or max diet restriction  Level 4 (CK): Safe swallow but needs mod cues; and/or mod diet restriction; and/or still requires some nonoral feeding/supplements  Level 5 (CJ): Safe swallow with min diet restriction; and/or needs min cues  Level 6 (CI): Independent with p.o.; rare cues; usually self cues; may need to avoid some foods or needs extra time  Level 7 ([address] [place] [address]): Independent for all p.o.  CARLENE. (2003). National Outcomes Measurement System (NOMS): Adult Speech-Language Pathology User's Guide. Pain:  Pain Scale 1: Numeric (0 - 10)  Pain Intensity 1: 0     After treatment:   ?            Patient left in no apparent distress sitting up in chair  ? Patient left in no apparent distress in bed  ? Call bell left within reach  ? Nursing notified  ? Caregiver present  ? Bed alarm activated    COMMUNICATION/EDUCATION:   The patients plan of care including recommendations, planned interventions, and recommended diet changes were discussed with: Registered Nurse. Patient was educated regarding Her deficit(s) of dysphagia as this relates to Her diagnosis of ACDF. She demonstrated Excellent understanding as evidenced by teach back. ? Posted safety precautions in patient's room. ? Patient/family have participated as able in goal setting and plan of care.   ?            Patient/family agree to work toward stated goals and plan of care. ?            Patient understands intent and goals of therapy, but is neutral about his/her participation. ? Patient is unable to participate in goal setting and plan of care.     Thank you for this referral.  Bam Perera SLP  Time Calculation: 10 mins

## 2019-11-04 NOTE — PROGRESS NOTES
Problem: Mobility Impaired (Adult and Pediatric)  Goal: *Acute Goals and Plan of Care (Insert Text)  Description  FUNCTIONAL STATUS PRIOR TO ADMISSION: Patient was independent and active without use of DME.    HOME SUPPORT PRIOR TO ADMISSION: The patient lived with 2 daughters (12 y.o and 24 y. o) but did not require assist.    Physical Therapy Goals  Initiated 11/4/2019  1. Patient will move from supine to sit and sit to supine  in bed with modified independence within 7 day(s). 2.  Patient will transfer from bed to chair and chair to bed with modified independence using the least restrictive device within 7 day(s). 3.  Patient will perform sit to stand with modified independence within 7 day(s). 4.  Patient will ambulate with modified independence for 350 feet with the least restrictive device within 7 day(s). 5.  Patient will ascend/descend 14 stairs with single handrail(s) with supervision/set-up within 7 day(s). Outcome: Progressing Towards Goal   PHYSICAL THERAPY EVALUATION  Patient: Elizabeth Villagran (34 y.o. female)  Date: 11/4/2019  Primary Diagnosis: Weakness [R53.1]  Compression of spinal cord (HCC) [G95.20]  Procedure(s) (LRB):  SPINE ANTERIOR CERVICAL  4,5 FUSION WITH INSTRUMENTATION (N/A) 1 Day Post-Op   Precautions:   Fall, Back      ASSESSMENT  Based on the objective data described below, the patient presents with impaired balance and gait, c/o R calf pain and impaired sensation RLE s/p anterior C4-5 fusion, POD #1. Pt received supine in bed with soft collar in place. Pt tolerated evaluation well. Pt with reports of feeling somewhat dizzy during mobility, but does have a history of vertigo, migraines, elevated BP, and on multiple pain medications; BP stable. Gait training completed with standby A and occasional CGA due to minor instability. Pt educated on activity recommendations and post-op spinal precautions. Will continue to follow to ensure safety on stair negotiation prior to d/c home. Anticipate pt will be able to return home with outpatient PT once cleared by MD.      Recommend OOB to chair for all meals, ambulate with RN staff at least 2x/day around the unit. Current Level of Function Impacting Discharge (mobility/balance): standby assist-CGA gait training x 300 feet    Functional Outcome Measure: The patient scored Total Score: 26/28 on the Tinetti outcome measure which is indicative of low fall risk. Patient will benefit from skilled therapy intervention to address the above noted impairments. PLAN :  Recommendations and Planned Interventions: bed mobility training, transfer training, gait training, therapeutic exercises, neuromuscular re-education, patient and family training/education and therapeutic activities      Frequency/Duration: Patient will be followed by physical therapy:  daily to address goals. Recommendation for discharge: (in order for the patient to meet his/her long term goals)  Outpatient physical therapy follow up recommended for balance/gait once cleared by MD     This discharge recommendation:  Has been made in collaboration with the attending provider and/or case management    IF patient discharges home will need the following DME: none         SUBJECTIVE:   Patient stated I've been getting up to and from the bathroom, but haven't walked much further than that.     OBJECTIVE DATA SUMMARY:   HISTORY:    Past Medical History:   Diagnosis Date    Dense breasts     GERD (gastroesophageal reflux disease)     Headache     Hypertension     Ill-defined condition     heart murmer    Nausea & vomiting     Sinus congestion      Past Surgical History:   Procedure Laterality Date    HX BACK SURGERY  2016    HX BUNIONECTOMY Bilateral     HX GYN  2002    D&C    HX OTHER SURGICAL  2016    sinus    US GUIDE ASP BREAST RT CYST W NDL Right 4 years ago ?     Benign     Home Situation  Home Environment: Private residence  # Steps to Enter: 0  One/Two Story Residence: [facility] story  # of Interior Steps: 15  Interior Rails: Right  Living Alone: No  Support Systems: Child(smitha), Family member(s)  Patient Expects to be Discharged to[de-identified] Private residence  Current DME Used/Available at Home: None    EXAMINATION/PRESENTATION/DECISION MAKING:   Critical Behavior:  Neurologic State: Alert, Eyes open spontaneously  Orientation Level: Oriented X4  Cognition: Appropriate decision making, Appropriate for age attention/concentration, Appropriate safety awareness  Safety/Judgement: Awareness of environment  Hearing: Auditory  Auditory Impairment: None    Range Of Motion:  AROM: Within functional limits                       Strength:    Strength:  Within functional limits                    Tone & Sensation:                  Sensation: Impaired               Coordination:     Vision:      Functional Mobility:  Bed Mobility:     Supine to Sit: Stand-by assistance        Transfers:  Sit to Stand: Stand-by assistance  Stand to Sit: Stand-by assistance                       Balance:   Sitting: Intact  Standing: Intact  Ambulation/Gait Training:  Distance (ft): 300 Feet (ft)  Assistive Device: Gait belt(soft neck collar)  Ambulation - Level of Assistance: Stand-by assistance;Contact guard assistance        Gait Abnormalities: Decreased step clearance        Base of Support: Widened     Speed/Radha: Pace decreased (<100 feet/min)  Step Length: Right shortened;Left shortened         Functional Measure:  Tinetti test:    Sitting Balance: 1  Arises: 1  Attempts to Rise: 2  Immediate Standing Balance: 2  Standing Balance: 2  Nudged: 2  Eyes Closed: 1  Turn 360 Degrees - Continuous/Discontinuous: 1  Turn 360 Degrees - Steady/Unsteady: 1  Sitting Down: 1  Balance Score: 14 Balance total score  Indication of Gait: 1  R Step Length/Height: 1  L Step Length/Height: 1  R Foot Clearance: 1  L Foot Clearance: 1  Step Symmetry: 1  Step Continuity: 1  Path: 2  Trunk: 2  Walking Time: 1  Gait Score: 12 Gait total score  Total Score: 26/28 Overall total score         Tinetti Tool Score Risk of Falls  <19 = High Fall Risk  19-24 = Moderate Fall Risk  25-28 = Low Fall Risk  Tinetti ME. Performance-Oriented Assessment of Mobility Problems in Elderly Patients. Kong 66; [id number]. (Scoring Description: PT Bulletin Feb. 10, 1993)    Older adults: Alice Shantell et al, 2009; n = 1000 [facility] elderly evaluated with ABC, HERVE, ADL, and IADL)  · Mean HERVE score for males aged 69-68 years = 26.21(3.40)  · Mean HERVE score for females age 69-68 years = 25.16(4.30)  · Mean HERVE score for males over 80 years = 23.29(6.02)  · Mean HERVE score for females over 80 years = 17.20(8.32)        Based on the above components, the patient evaluation is determined to be of the following complexity level: LOW       Activity Tolerance:   Good  Please refer to the flowsheet for vital signs taken during this treatment. After treatment patient left in no apparent distress:   Sitting in chair, Call bell within reach and Caregiver / family present    COMMUNICATION/EDUCATION:   The patients plan of care was discussed with: Occupational Therapist and Registered Nurse. Fall prevention education was provided and the patient/caregiver indicated understanding., Patient/family have participated as able in goal setting and plan of care. and Patient/family agree to work toward stated goals and plan of care.     Thank you for this referral.  Barbra Leyden, PT, DPT   Time Calculation: 23 mins

## 2019-11-04 NOTE — PROGRESS NOTES
Problem: Pain  Goal: *Control of Pain  Outcome: Progressing Towards Goal     Problem: Falls - Risk of  Goal: *Absence of Falls  Description  Document Derek Lewis Fall Risk and appropriate interventions in the flowsheet.   Outcome: Progressing Towards Goal  Note:   Fall Risk Interventions:  Mobility Interventions: Assess mobility with egress test, Patient to call before getting OOB, PT Consult for mobility concerns, PT Consult for assist device competence, Strengthening exercises (ROM-active/passive)         Medication Interventions: Assess postural VS orthostatic hypotension, Patient to call before getting OOB, Teach patient to arise slowly    Elimination Interventions: Call light in reach, Patient to call for help with toileting needs, Stay With Me (per policy)              Problem: Patient Education: Go to Patient Education Activity  Goal: Patient/Family Education  Outcome: Progressing Towards Goal     Problem: Infection - Risk of, Surgical Site Infection  Goal: *Absence of surgical site infection signs and symptoms  Outcome: Progressing Towards Goal     Problem: Pain  Goal: *Control of Pain  Outcome: Progressing Towards Goal

## 2019-11-04 NOTE — PROGRESS NOTES
SLP Contact Note    Patient seen by SLP for dysphagia evaluation. Patient currently without significant deficits, however, research shows that peak prevertebral edema following ACDF is typically POD 2-3. Patient is currently POD 1. Therefore, will continue to follow while patient is in-house to ensure that patient without significant difficulty continuing diet. Full evaluation note to follow.       Thank you,  DEMETRIUS HamptonEd, [id number] [facility]  Speech-Language Pathologist

## 2019-11-04 NOTE — ROUTINE PROCESS
Bedside and Verbal shift change report given to Robby Joyce RN (oncoming nurse) by Katherine Mcmillan (offgoing nurse). Report included the following information SBAR, Kardex, Intake/Output, MAR, Recent Results and Cardiac Rhythm NSR.

## 2019-11-04 NOTE — PROGRESS NOTES
Transition of [address] with medical follow up    Had spine anterior  cervical fusion     Lives with her two daughters (12 and 24)  Good family support    CM will arrange home health if ordered. Therapy recommends  outpatient therapy    Patient family will transport home. CM met with patient and she is in agreement with returning home -- She will have family support. .   Patient works for the [employer] in [employer] and will return to work when medically able

## 2019-11-05 VITALS
HEIGHT: 64 IN | TEMPERATURE: 97.9 F | BODY MASS INDEX: 32.07 KG/M2 | HEART RATE: 82 BPM | DIASTOLIC BLOOD PRESSURE: 81 MMHG | OXYGEN SATURATION: 97 % | RESPIRATION RATE: 19 BRPM | SYSTOLIC BLOOD PRESSURE: 134 MMHG | WEIGHT: 187.83 LBS

## 2019-11-05 LAB
ANION GAP SERPL CALC-SCNC: 2 MMOL/L (ref 5–15)
BASOPHILS # BLD: 0.1 K/UL (ref 0–0.1)
BASOPHILS NFR BLD: 1 % (ref 0–1)
BUN SERPL-MCNC: 12 MG/DL (ref 6–20)
BUN/CREAT SERPL: 12 (ref 12–20)
CALCIUM SERPL-MCNC: 8.5 MG/DL (ref 8.5–10.1)
CHLORIDE SERPL-SCNC: 105 MMOL/L (ref 97–108)
CO2 SERPL-SCNC: 29 MMOL/L (ref 21–32)
CREAT SERPL-MCNC: 0.98 MG/DL (ref 0.55–1.02)
DIFFERENTIAL METHOD BLD: ABNORMAL
EOSINOPHIL # BLD: 0.3 K/UL (ref 0–0.4)
EOSINOPHIL NFR BLD: 2 % (ref 0–7)
ERYTHROCYTE [DISTWIDTH] IN BLOOD BY AUTOMATED COUNT: 14.4 % (ref 11.5–14.5)
GLUCOSE SERPL-MCNC: 104 MG/DL (ref 65–100)
HCT VFR BLD AUTO: 40.2 % (ref 35–47)
HGB BLD-MCNC: 12.6 G/DL (ref 11.5–16)
IMM GRANULOCYTES # BLD AUTO: 0.1 K/UL (ref 0–0.04)
IMM GRANULOCYTES NFR BLD AUTO: 0 % (ref 0–0.5)
LYMPHOCYTES # BLD: 3.7 K/UL (ref 0.8–3.5)
LYMPHOCYTES NFR BLD: 28 % (ref 12–49)
MCH RBC QN AUTO: 27.8 PG (ref 26–34)
MCHC RBC AUTO-ENTMCNC: 31.3 G/DL (ref 30–36.5)
MCV RBC AUTO: 88.7 FL (ref 80–99)
MONOCYTES # BLD: 0.7 K/UL (ref 0–1)
MONOCYTES NFR BLD: 6 % (ref 5–13)
NEUTS SEG # BLD: 8.4 K/UL (ref 1.8–8)
NEUTS SEG NFR BLD: 63 % (ref 32–75)
NRBC # BLD: 0 K/UL (ref 0–0.01)
NRBC BLD-RTO: 0 PER 100 WBC
PLATELET # BLD AUTO: 283 K/UL (ref 150–400)
PMV BLD AUTO: 10.2 FL (ref 8.9–12.9)
POTASSIUM SERPL-SCNC: 3.4 MMOL/L (ref 3.5–5.1)
RBC # BLD AUTO: 4.53 M/UL (ref 3.8–5.2)
SODIUM SERPL-SCNC: 136 MMOL/L (ref 136–145)
WBC # BLD AUTO: 13.3 K/UL (ref 3.6–11)

## 2019-11-05 PROCEDURE — 97116 GAIT TRAINING THERAPY: CPT

## 2019-11-05 PROCEDURE — 74011250637 HC RX REV CODE- 250/637: Performed by: NEUROLOGICAL SURGERY

## 2019-11-05 PROCEDURE — 36415 COLL VENOUS BLD VENIPUNCTURE: CPT

## 2019-11-05 PROCEDURE — 85025 COMPLETE CBC W/AUTO DIFF WBC: CPT

## 2019-11-05 PROCEDURE — 80048 BASIC METABOLIC PNL TOTAL CA: CPT

## 2019-11-05 PROCEDURE — 74011250637 HC RX REV CODE- 250/637: Performed by: FAMILY MEDICINE

## 2019-11-05 PROCEDURE — 92526 ORAL FUNCTION THERAPY: CPT

## 2019-11-05 RX ORDER — OXYCODONE HYDROCHLORIDE 5 MG/1
5 TABLET ORAL
Qty: 10 TAB | Refills: 0 | Status: SHIPPED | OUTPATIENT
Start: 2019-11-05 | End: 2019-11-08

## 2019-11-05 RX ORDER — AMOXICILLIN 250 MG
1 CAPSULE ORAL
Qty: 10 TAB | Refills: 0 | Status: SHIPPED | OUTPATIENT
Start: 2019-11-05 | End: 2019-12-09

## 2019-11-05 RX ADMIN — Medication 10 ML: at 06:06

## 2019-11-05 RX ADMIN — CYCLOBENZAPRINE HYDROCHLORIDE 10 MG: 10 TABLET, FILM COATED ORAL at 08:07

## 2019-11-05 RX ADMIN — ACETAMINOPHEN 650 MG: 325 TABLET, FILM COATED ORAL at 08:08

## 2019-11-05 RX ADMIN — ACETAMINOPHEN 650 MG: 325 TABLET, FILM COATED ORAL at 02:03

## 2019-11-05 RX ADMIN — PANTOPRAZOLE SODIUM 40 MG: 40 TABLET, DELAYED RELEASE ORAL at 06:52

## 2019-11-05 RX ADMIN — DOCUSATE SODIUM AND SENNOSIDES 1 TABLET: 50; 8.6 TABLET, FILM COATED ORAL at 08:07

## 2019-11-05 RX ADMIN — OXYCODONE HYDROCHLORIDE 10 MG: 5 TABLET ORAL at 06:03

## 2019-11-05 NOTE — PROGRESS NOTES
Bedside and Verbal shift change report given to [address]) by Ileana Brown RN (offgoing nurse). Report included the following information SBAR, Kardex and Recent Results.

## 2019-11-05 NOTE — PROGRESS NOTES
Problem: Mobility Impaired (Adult and Pediatric)  Goal: *Ability to perform ADLs and demonstrates progressive mobility and function  Outcome: Progressing Towards Goal  Goal: *Ambulates and performs ADL's  Outcome: Progressing Towards Goal     Problem: Pain  Goal: *Control of Pain  Outcome: Progressing Towards Goal  Goal: *Verbalizes understanding of type and use of pain medication  Outcome: Progressing Towards Goal     Problem: Falls - Risk of  Goal: *Absence of Falls  Description  Document Clara Fall Risk and appropriate interventions in the flowsheet.   Outcome: Progressing Towards Goal  Note:   Fall Risk Interventions:  Mobility Interventions: Assess mobility with egress test, Patient to call before getting OOB, PT Consult for mobility concerns         Medication Interventions: Patient to call before getting OOB, Teach patient to arise slowly    Elimination Interventions: Call light in reach, Toileting schedule/hourly rounds, Toilet paper/wipes in reach              Problem: Patient Education: Go to Patient Education Activity  Goal: Patient/Family Education  Outcome: Progressing Towards Goal     Problem: Infection - Risk of, Surgical Site Infection  Goal: *Absence of surgical site infection signs and symptoms  Outcome: Progressing Towards Goal     Problem: Pain  Goal: *Control of Pain  Outcome: Progressing Towards Goal     Problem: Patient Education: Go to Patient Education Activity  Goal: Patient/Family Education  Outcome: Progressing Towards Goal     Problem: Patient Education: Go to Patient Education Activity  Goal: Patient/Family Education  Outcome: Progressing Towards Goal     Problem: Patient Education: Go to Patient Education Activity  Goal: Patient/Family Education  Outcome: Progressing Towards Goal     Problem: Discharge Planning  Goal: *Discharge to safe environment  Outcome: Progressing Towards Goal

## 2019-11-05 NOTE — PROGRESS NOTES
Bedside RN performed patient education and medication education. Discharge concerns initiated and discussed with patient, including clarification on \"who\" assists the patient at their home and instructions for when the home going patient should call their provider after discharge. Opportunity for questions and clarification was provided. Patient receptive to education: YES  Patient stated: \"I'm following up with Dr. Julia Real. \"  Barriers to Education: None. Diagnosis Education given:  YES    Length of stay: 2  Expected Day of Discharge: 11/5/19  Ask if they have \"Help at Home\" & add to white board? YES    Education Day #: 3    Medication Education Given:  YES  M in the box Medication name: Oxycodone IR 5MG    Pt aware of HCAHPS survey: YES    If you experience chest pain call 911. Do not drive yourself. Discharge medications reviewed with patient and appropriate educational materials and side effects teaching were provided. I have reviewed discharge instructions with the patient. The patient verbalized understanding.

## 2019-11-05 NOTE — PROGRESS NOTES
PHYSICAL THERAPY TREATMENT/DISCHARGE  Patient: Candis Ring (60 y.o. female)  Date: 11/5/2019  Diagnosis: Weakness [R53.1]  Compression of spinal cord (HCC) [G95.20] <principal problem not specified>  Procedure(s) (LRB):  SPINE ANTERIOR CERVICAL  4,5 FUSION WITH INSTRUMENTATION (N/A) 2 Days Post-Op  Precautions: Fall, Back  Chart, physical therapy assessment, plan of care and goals were reviewed. ASSESSMENT  Patient continues with skilled PT services and has progressed towards goals. Pt received supine in bed and agreeable to therapy. Pt tolerated session well and has met all goals. Pt completed gait training with improvement in gait pattern and denied dizziness. Pt also completed stair training x 14 steps via step over step pattern without difficulty or LOB. Pt continues to report R calf tightness and encouraged pt to perform gentle stretching and ROM for pain relief. Pt is cleared from a PT standpoint to discharge home with outpatient PT services once cleared by MD    Other factors to consider for discharge: none         PLAN :  Patient will be discharged from acute skilled physical therapy at this time. Rationale for discharge:  Goals achieved    Recommendation for discharge: (in order for the patient to meet his/her long term goals)  Outpatient physical therapy follow up recommended for gait and balance    This discharge recommendation:  Has been made in collaboration with the attending provider and/or case management    IF patient discharges home will need the following DME: none       SUBJECTIVE:   Patient stated I feel much better today.     OBJECTIVE DATA SUMMARY:   Critical Behavior:  Neurologic State: Alert  Orientation Level: Oriented X4  Cognition: Follows commands, Appropriate safety awareness  Safety/Judgement: Awareness of environment  Functional Mobility Training:  Bed Mobility:     Supine to Sit: Modified independent              Transfers:  Sit to Stand: Independent  Stand to Sit: Independent                             Balance:  Sitting: Intact  Standing: Intact  Ambulation/Gait Training:  Distance (ft): 300 Feet (ft)  Assistive Device: Gait belt(soft neck collar)  Ambulation - Level of Assistance: Independent        Gait Abnormalities: Decreased step clearance        Base of Support: Widened     Speed/Radha: Pace decreased (<100 feet/min)  Step Length: Right shortened;Left shortened    Stairs:  Number of Stairs Trained: 13  Stairs - Level of Assistance: Stand-by assistance   Rail Use: Right     Activity Tolerance:   Good  Please refer to the flowsheet for vital signs taken during this treatment.     After treatment patient left in no apparent distress:   Supine in bed, Call bell within reach, Caregiver / family present and Side rails x 3    COMMUNICATION/COLLABORATION:   The patients plan of care was discussed with: Occupational Therapist and Registered Nurse    Oleksandr Pena PT, DPT   Time Calculation: 11 mins

## 2019-11-05 NOTE — DISCHARGE SUMMARY
Discharge Summary     Patient:  Rosalinda Pinto       MRN: [id number]       YOB: 1975       Age: 53 y.o. Date of admission:  11/1/2019    Date of discharge:  11/5/2019    Primary care provider: Dr. Camilla Calderon provider:  Don Joseph MD    Discharging provider:  Junior Cartagena [address] [phone]. If unavailable, call [phone] and ask the  to page the triage hospitalist.    Consultations  · IP CONSULT TO HOSPITALIST  · IP CONSULT TO NEUROLOGY  · IP CONSULT TO NEUROSURGERY    Procedures  · Procedure(s):  · SPINE ANTERIOR CERVICAL  4,5 FUSION WITH INSTRUMENTATION    Discharge destination: home with outpt PT. The patient is stable for discharge. Admission diagnosis  · Weakness [R53.1]  · Compression of spinal cord (HCC) [G95.20]    Current Discharge Medication List      START taking these medications    Details   oxyCODONE IR (ROXICODONE) 5 mg immediate release tablet Take 1 Tab by mouth every six (6) hours as needed for Pain for up to 3 days. Max Daily Amount: 20 mg.  Qty: 10 Tab, Refills: 0    Associated Diagnoses: Cervical disc herniation      senna-docusate (PERICOLACE) 8.6-50 mg per tablet Take 1 Tab by mouth two (2) times daily as needed for Constipation. Qty: 10 Tab, Refills: 0         CONTINUE these medications which have NOT CHANGED    Details   amitriptyline (ELAVIL) 10 mg tablet Take 10 mg by mouth nightly. cyclobenzaprine (FLEXERIL) 10 mg tablet Take 10 mg by mouth two (2) times a day. aspirin-acetaminophen-caffeine (EXCEDRIN ES) 250-250-65 mg per tablet Take 1 Tab by mouth daily as needed for Headache.      herbal drugs (COLON HERBAL CLEANSER PO) Take 1 Cap by mouth daily. magnesium oxide (MAG-OX) 400 mg tablet Take 1 Tab by mouth nightly.   Qty: 90 Tab, Refills: 1      hydroCHLOROthiazide (MICROZIDE) 12.5 mg capsule TAKE ONE CAPSULE BY MOUTH EVERY DAY  Qty: 90 Cap, Refills: 1    Associated Diagnoses: Essential hypertension      ZOLMitriptan (ZOMIG) 2.5 mg spry 1 Spray by Nasal route once as needed for up to 1 dose. Qty: 4 Each, Refills: 3      omeprazole (PRILOSEC) 20 mg capsule Take 20 mg by mouth daily. Indications: GASTROESOPHAGEAL REFLUX      multivitamin (ONE A DAY) tablet Take 1 Tab by mouth daily. Indications: VITAMIN DEFICIENCY PREVENTION         STOP taking these medications       traMADol (ULTRAM) 50 mg tablet Comments:   Reason for Stopping:         predniSONE (DELTASONE) 10 mg tablet Comments:   Reason for Stopping: Follow-up Information     Follow up With Specialties Details Why Contact Info    Yulissa Gutierrez MD Neurosurgery Schedule an appointment as soon as possible for a visit in 2 weeks For wound re-check [address]  [phone]            Final discharge diagnoses and brief hospital course  44F recent C-spine surgery. P/w R side weakness     #. Cervical disc herniation with cord compression s/p ACDF C4/5 11/03  - presented with R side weakness: neck down to RLE  - CT head: NAD, MRI Brain: NAD, empty sella. - MRI C- spine: C4-5 right paracentral or posterior lateral disc extrusion with cephalad extension with moderate cord displacement and compression  - TTE preserved ef.  - NeuroSurgery following  - s/p C-spine surgery for mild compression on chord 11/3  - SLP/PT/OT  - Soft collar for comfort  - dced on po Oxy IR 5mg prn for 3 days   - outpt NS follow up    #. Hypokalemia-  Replaced  #. Leucocytosis 17.8 on poa - trending down to 13.3 possible due to steroids . No signs of infection       #. HTN: chronic, stable, Home regimen HCTZ  #.  GERD: prilosec       Physical examination at discharge  Visit Vitals  /81 (BP 1 Location: Right arm, BP Patient Position: At rest)   Pulse 82   Temp 97.9 °F (36.6 °C)   Resp 19   Ht 5' 4\" (1.626 m)   Wt 85.2 kg (187 lb 13.3 oz)   SpO2 97%   BMI 32.24 kg/m² General:  Alert, oriented, No acute distress, Collar on Neck  Resp:  No accessory muscle use, Good AE, no wheezes, no rhonchi  Abd:  Soft, non-tender, non-distended  Extremities:  No cyanosis or clubbing, no significant edema  Neuro:  Grossly normal, no focal neuro deficits, follows commands, Rside = L side  Psych:  Good insight, AAOx3, not agitated. Pertinent imaging studies:    MRI :  1. C4-5 right paracentral or posterior lateral disc extrusion with cephalad  extension with moderate cord displacement and compression. Minimal if any cord  edema. 2. Chronic disc protrusion/osteophytes with mild stenosis C2-3 and C3-4.  3. No unusual postoperative findings C5-6 and C6-7    Recent Labs     11/05/19  0928   WBC 13.3*   HGB 12.6   HCT 40.2        Recent Labs     11/05/19  0928      K 3.4*      CO2 29   BUN 12   CREA 0.98   *   CA 8.5     No results for input(s): SGOT, GPT, AP, TBIL, TP, ALB, GLOB, GGT, AML, LPSE in the last 72 hours. No lab exists for component: AMYP, HLPSE  No results for input(s): INR, PTP, APTT, INREXT in the last 72 hours. No results for input(s): FE, TIBC, PSAT, FERR in the last 72 hours. No results for input(s): PH, PCO2, PO2 in the last 72 hours. No results for input(s): CPK, CKMB in the last 72 hours.     No lab exists for component: TROPONINI  No components found for: [place]    Chronic Diagnoses:    Problem List as of 11/5/2019 Date Reviewed: 8/1/2019          Codes Class Noted - Resolved    Compression of spinal cord ([facility] [address] ICD-10-CM: G95.20  ICD-9-CM: 336.9  11/3/2019 - Present        Weakness ICD-10-CM: R53.1  ICD-9-CM: 780.79  11/1/2019 - Present        DDD (degenerative disc disease), cervical ICD-10-CM: M50.30  ICD-9-CM: 722.4  10/18/2016 - Present        Hypertension ICD-10-CM: I10  ICD-9-CM: 401.9  Unknown - Present        Headache ICD-10-CM: R51  ICD-9-CM: 784.0  Unknown - Present        Arrhythmia ICD-10-CM: I49.9  ICD-9-CM: 427.9  Unknown - Present        Dense breasts ICD-10-CM: R92.2  ICD-9-CM: 793.82  Unknown - Present        Sinus congestion ICD-10-CM: R09.81  ICD-9-CM: 478.19  Unknown - Present        RESOLVED: Cervical spinal stenosis ICD-10-CM: M48.02  ICD-9-CM: 723.0  10/18/2016 - 10/26/2016        RESOLVED: HNP (herniated nucleus pulposus), cervical ICD-10-CM: M50.20  ICD-9-CM: 722.0  10/18/2016 - 10/26/2016              Time spent on discharge related activities today greater than 30 minutes.       Signed:  Iraj Baum MD                 Hospitalist, Internal Medicine      Cc: None

## 2019-11-05 NOTE — PROGRESS NOTES
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 HOME SUPPORT: The patient lives in home with 12and 24year old daughters. Occupational Therapy Goals  Initiated 11/4/2019  1. Patient will perform bathing with modified independence within 7 day(s). 2.  Patient will perform grooming with independence within 7 day(s). 3.  Patient will perform toileting with independence within 7 day(s). 4.  Patient will perform toilet transfers with independence within 7 day(s). 5.  Patient will perform all aspects of toileting with independence within 7 day(s). 6.  Patient will utilize energy conservation techniques during functional activities with verbal and visual cues within 7 day(s). 7. Pt independently implements spine precautions during ADL and related mobility within 7 days. Outcome: Progressing Towards Goal   OCCUPATIONAL THERAPY EVALUATION  Patient: Tammie Wilkinson (62 y.o. female)  Date: 11/5/2019  Primary Diagnosis: Weakness [R53.1]  Compression of spinal cord (HCC) [G95.20]  Procedure(s) (LRB):  SPINE ANTERIOR CERVICAL  4,5 FUSION WITH INSTRUMENTATION (N/A) 1 Day Post-Op   Precautions:  Fall, Back    ASSESSMENT  Based on the objective data described below, the patient presents with back precautions, c-collar intact, decreased standing tolerance POD #1 (pt endorses transient \"vertigo\" at baseline that she notes often correlates with elevated BP). Pt was very pleasant and motivated with OT. Pt is knowledgeable regarding surgical recovery as she is s/p prior spinal surgery for which pt states she underwent outpatient therapies and plans to speak with surgeon about repeating this course at her surgical follow up in community. Reviewed Tailor sit technique for safe ADL with back precautions.  Pt endorses she will have adequate support at home for recovery (one teen and one adult daughter in the home and planning to set up first level living during pt recovery). Pt presents with equal BUE strength and pt endorses full resolution of BUE pre-operative symptoms. 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 OT recommended initial assist/ supervision for bathing at home for safety and pt agreed. Current Level of Function Impacting Discharge (ADLs/self-care): Up to SBA    Functional Outcome Measure: The patient scored 70/100 on the Barthel Index outcome measure which is indicative of mild functional impairment/ caregiver dependence. Other factors to consider for discharge: Pt endorses having adequate support at home from family. Patient will benefit from skilled therapy intervention to address the above noted impairments. PLAN :  Recommendations and Planned Interventions: self care training, functional mobility training, balance training, therapeutic activities, endurance activities, patient education, home safety training and family training/education    Frequency/Duration: Patient will be followed by occupational therapy 5 times a week to address goals. Recommendation for discharge: (in order for the patient to meet his/her long term goals)  To be determined: Pt to speak with surgeon about outpatient therapy at her surgical follow up appointment. This discharge recommendation:  Has not yet been discussed the attending provider and/or case management    IF patient discharges home will need the following DME: Seated showering- ? Bench at home        SUBJECTIVE:   Patient stated I feel good.     OBJECTIVE DATA SUMMARY:   HISTORY:   Past Medical History:   Diagnosis Date    Dense breasts     GERD (gastroesophageal reflux disease)     Headache     Hypertension     Ill-defined condition     heart murmer    Nausea & vomiting     Sinus congestion      Past Surgical History:   Procedure Laterality Date    HX BACK SURGERY  2016    HX BUNIONECTOMY Bilateral     HX GYN  2002    D&C    HX OTHER SURGICAL  2016    sinus    US GUIDE ASP BREAST RT CYST W NDL Right 4 years ago ? Benign       Expanded or extensive additional review of patient history:     Home Situation  Home Environment: Private residence  # Steps to Enter: 0  One/Two Story Residence: Two story, live on 1st floor  # of Interior Steps: 15  Interior Rails: Right  Living Alone: No  Support Systems: Child(smitha)  Patient Expects to be Discharged to[de-identified] Private residence  Current DME Used/Available at Home: (Seat in shower)  Tub or Shower Type: Shower    Hand dominance: Right    EXAMINATION OF PERFORMANCE DEFICITS:  Cognitive/Behavioral Status:  Neurologic State: Alert  Orientation Level: Oriented X4  Cognition: Appropriate decision making; Follows commands             Skin: Appears intact BUE    Edema: None noted BUE    Hearing:   Auditory  Auditory Impairment: None    Vision/Perceptual:       WFL                              Range of Motion:  BUE WFL                            Strength:  BUE WFL                   Coordination:        BUE WFL       Tone & Sensation: BUE WFL                            Balance: Intact       Functional Mobility and Transfers for ADLs:  Bed Mobility:       Transfers:  Sit-stand: SBA  Stand-Sit: SBA  Bed-chair: SBA  Bathroom mobility: SBA         ADL Assessment:        Toileting: SBA  Feeding: Independent  Grooming: Set up  Bathing: Set up (seated, simulated)  UB dressing: Set up  LB dressing: Set up (Tailor sit)                                    ADL Intervention and task modifications:                                          Therapeutic Exercise:  None   Functional Measure:  Barthel Index:     Bathin  Bladder: 5  Bowels: 10  Dressin  Feeding: 10  Groomin  Mobility: 15  Stairs: 0  Toilet Use: 10  Transfer: 10  Total: 70/100  The Barthel ADL Index: Guidelines  1. The index should be used as a record of what a patient does, not as a record of what a patient could do. 2. The main aim is to establish degree of independence from any help, physical or verbal, however minor and for whatever reason. 3. The need for supervision renders the patient not independent. 4. A patient's performance should be established using the best available evidence. Asking the patient, friends/relatives and nurses are the usual sources, but direct observation and common sense are also important. However direct testing is not needed. 5. Usually the patient's performance over the preceding 24-48 hours is important, but occasionally longer periods will be relevant. 6. Middle categories imply that the patient supplies over 50 per cent of the effort. 7. Use of aids to be independent is allowed. Joan Valdez., Barthel, D.W. (9592). Functional evaluation: the Barthel Index. [facility] (14)2. MARYBEL Cowan, Herman Senior., Sonia Floyd., [place], [address] [place] [address] (1999). Measuring the change indisability after inpatient rehabilitation; comparison of the responsiveness of the Barthel Index and Functional [place] Measure. Journal of Neurology, Neurosurgery, and Psychiatry, 66(4), 523-733. Nelda Kenny, N.J.A, LUIDN Montelongo, & Colleen Reinoso M.A. (2004.) Assessment of post-stroke quality of life in cost-effectiveness studies: The usefulness of the Barthel Index and the EuroQoL-5D. Quality of Life Research, 13, 427-36     Pain Rating:  None reported    Activity Tolerance:   Fair to Good  Please refer to the flowsheet for vital signs taken during this treatment. After treatment patient left in no apparent distress:    Sitting in chair, Call bell within reach and Caregiver / family present    COMMUNICATION/EDUCATION:   The patients plan of care was discussed with: Physical Therapist and Registered Nurse.     Home safety education was provided and the patient/caregiver indicated understanding., Patient/family have participated as able in goal setting and plan of care. and Patient/family agree to work toward stated goals and plan of care. This patients plan of care is appropriate for delegation to [facility].     Thank you for this referral.  Frank Pendleton  Time Calculation: 20 mins

## 2019-11-05 NOTE — ROUTINE PROCESS
Bedside shift change report given to Tara Hamilton (oncoming nurse) by Irineo Dueñas (offgoing nurse). Report included the following information SBAR, Kardex, Intake/Output, MAR, Accordion, Recent Results and Cardiac Rhythm NSR.

## 2019-11-05 NOTE — PROGRESS NOTES
Problem: Mobility Impaired (Adult and Pediatric)  Goal: *Ability to perform ADLs and demonstrates progressive mobility and function  Outcome: Progressing Towards Goal  Goal: *Ambulates and performs ADL's  Outcome: Progressing Towards Goal     Problem: Pain  Goal: *Control of Pain  Outcome: Progressing Towards Goal  Goal: *Verbalizes understanding of type and use of pain medication  Outcome: Progressing Towards Goal     Problem: Falls - Risk of  Goal: *Absence of Falls  Description  Document Clara Fall Risk and appropriate interventions in the flowsheet.   Outcome: Progressing Towards Goal  Note:   Fall Risk Interventions:  Mobility Interventions: Assess mobility with egress test, Patient to call before getting OOB, PT Consult for mobility concerns         Medication Interventions: Patient to call before getting OOB, Teach patient to arise slowly    Elimination Interventions: Call light in reach, Toileting schedule/hourly rounds, Toilet paper/wipes in reach              Problem: Patient Education: Go to Patient Education Activity  Goal: Patient/Family Education  Outcome: Progressing Towards Goal     Problem: Infection - Risk of, Surgical Site Infection  Goal: *Absence of surgical site infection signs and symptoms  Outcome: Progressing Towards Goal     Problem: Pain  Goal: *Control of Pain  Outcome: Progressing Towards Goal  Goal: *PALLIATIVE CARE:  Alleviation of Pain  Outcome: Progressing Towards Goal     Problem: Patient Education: Go to Patient Education Activity  Goal: Patient/Family Education  Outcome: Progressing Towards Goal     Problem: Patient Education: Go to Patient Education Activity  Goal: Patient/Family Education  Outcome: Progressing Towards Goal     Problem: Patient Education: Go to Patient Education Activity  Goal: Patient/Family Education  Outcome: Progressing Towards Goal     Problem: Discharge Planning  Goal: *Discharge to safe environment  Outcome: Progressing Towards Goal

## 2019-11-05 NOTE — PROGRESS NOTES
CLAIRE Plan:    * Disposition- home with family support  * Therapy has recommended OP PT  *  SLP d/c recommendations are pending  * D/c F/U with Neurosurgery    Anticipate discharge home today. For Op PT patient would require script. Family to transport home at discharge. Care Management Interventions  PCP Verified by CM: (None )  Transition of Care Consult (CM Consult): (TBD)  MyChart Signup: No  Discharge Durable Medical Equipment: No  Physical Therapy Consult: Yes  Occupational Therapy Consult: Yes  Speech Therapy Consult: No  Current Support Network:  Other  Confirm Follow Up Transport: Family  Plan discussed with Pt/Family/Caregiver: Yes  Freedom of Choice Offered: Yes  [address] Provided?: No  Discharge Location  Discharge Placement: Home with outpatient services     Lani Montez 56

## 2019-11-05 NOTE — PROGRESS NOTES
Patient listed as not having a primary care physician. Spoke with patient indicating she has a PCP in mind and would like to establish/schedule a follow up appointment on her own.   Pending patient discharge, Srinath Arteaga, Care Management Specialist.

## 2019-11-05 NOTE — DISCHARGE INSTRUCTIONS
Trisha Marin M.D. What can I do?  The only exercise you should do is walking. Start by walking twice a day for 5 minutes, then increase by  2-3 minutes every day until you reach 25 minutes twice a day. DO NOT sit for long periods of time (20 minutes at a time for the first couple of weeks, then gradually increase.  No heavy lifting (5 lbs max); no straining, twisting, or bending.  No driving until your physician tells you it is ok. It is, however, ok to ride short distances in a car.  If you are required to wear a back brace, you may remove it when you are sleeping unless your doctor has advised against it.  If you are required to wear a cervical collar, you must sleep in it. You can remove it only for showers. What can I eat?  You may resume your regular home diet as tolerated. If your throat is sore, you may want to eat soft food for the first few days. When can I take a shower?  You may shower 48 hours after surgery, leaving on your dressing. Change the dressing to a large band-aid after showering. Change the bandage daily until your follow-up appt. Leave steri-strips on. These will fall off on their own.  Do not take baths or soak in pools    Medications:   Take prescription medicine as directed. DO NOT take more than prescribed. Call your physician if the prescribed dose is not sufficiently controlling your pain.  It is important to have regular bowel movements. Pain medications may cause constipation. Drink plenty of fluids, get enough fiber in your diet, and use stool softeners and drink prune juice to help prevent constipation. Do not take laxatives if at all possible except in severe situations. It can result in a vicious cycle of constipation and diarrhea.  Do not put any ointments or creams on your incision unless directed to do so by your physician.  Tobacco products should be avoided during the postoperative phase.       When do I see the physician again?  Please call your physicians office as soon as you get home to schedule your 1st post operative appointment. You should be seen approximately two weeks after your surgery.  If you had a fusion, you will need to get an order for xrays to be taken prior to the six week appointment. These should be done on the day of the appointment or 1-2 days before. NOTIFY YOUR PHYSICIAN OF ANY OF THE FOLLOWING:     Fever above 101º for 24 hrs.  Nausea or vomiting.  Inability to urinate   Loss of bowel or bladder function (sudden onset of incontinence)   Changes in sensation (numbness, tingling, color change) in your extremities   Pain not relieved by your medicine.  Redness, swelling or drainage from your incision   Persistent pain in the calf of either leg   Development of severe pain      FOR APPOINTMENTS OR QUESTIONS CALL:    [facility]  [phone]    Please bring this form with you to show your primary care provider at your follow-up appointment. Primary care provider:  Dr. Cooper Trujillo    Discharging provider:  Patricia Burks MD    You have been admitted to the hospital with the following diagnoses:  · Weakness [R53.1]  · Compression of spinal cord ([address] [G95.20]    FOLLOW-UP CARE RECOMMENDATIONS:    APPOINTMENTS:  · Follow-up with primary care provider, Dr. Cooper Trujillo  -  Please call None shortly after discharge to set up an appointment to be seen in  1 week   · Neurosurgery in 1 week    FOLLOW-UP TESTS recommended: none    PENDING TEST RESULTS:  At the time of your discharge the following test results are still pending: none  Please make sure you review these results with your outpatient follow-up provider(s). SYMPTOMS to watch for: chest pain, shortness of breath, fever, chills, nausea, vomiting, diarrhea, change in mentation, falling, weakness, bleeding.     DIET/what to eat:  Regular Diet    ACTIVITY:  Activity as tolerated    What to do if new or unexpected symptoms occur? If you experience any of the above symptoms (or should other concerns or questions arise after discharge) please call your primary care physician. Return to the emergency room if you cannot get hold of your doctor. · It is very important that you keep your follow-up appointment(s). · Please bring discharge papers, medication list (and/or medication bottles) to your follow-up appointments for review by your outpatient provider(s). · Please check the list of medications and be sure it includes every medication (even non-prescription medications) that your provider wants you to take. · It is important that you take the medication exactly as they are prescribed. · Keep your medication in the bottles provided by the pharmacist and keep a list of the medication names, dosages, and times to be taken in your wallet. · Do not take other medications without consulting your doctor. · If you have any questions about your medications or other instructions, please talk to your nurse or care provider before you leave the hospital.    I understand that if any problems occur once I am at home I am to contact my physician. These instructions were explained to me and I had the opportunity to ask questions.

## 2019-11-05 NOTE — PROGRESS NOTES
SPEECH LANGUAGE PATHOLOGY DYSPHAGIA TREATMENT  Patient: Naty Carvajal (46 y.o. female)  Date: 11/5/2019  Diagnosis: Weakness [R53.1]  Compression of spinal cord (HCC) [G95.20] <principal problem not specified>  Procedure(s) (LRB):  SPINE ANTERIOR CERVICAL  4,5 FUSION WITH INSTRUMENTATION (N/A) 2 Days Post-Op  Precautions:  Fall, Back    ASSESSMENT:  Pt continues to present with tolerance of PO at bedside, without any overt difficulties with eating/drinking. Although patient remains at elevated risk for prandial aspiration given s/p ACDF feel pt safe to continue diet as outlined below. Noted possible plans for discharge today. Should patient discharge, educated pt on symptoms to look out for including throat clear/cough after swallowing, lack of appetite, and wetness/gurgling after eating. Also educated on trying smaller bites/sips of food, alternate liquids and solids, and adding extra sauces and gravies if she has difficulty. Also explained that swelling will continue to be peak POD 2-3 and to contact her MD if she notices any difficulty. PLAN:  Recommendations and Planned Interventions:  --continue regular diet/thin liquids  --small sips/bites  --alternate liquids/solids  --extra sauces/gravies to moisten food  --fully upright during meals    Patient continues to benefit from skilled intervention to address the above impairments. Continue treatment per established plan of care. Discharge Recommendations:  None unless she has further swelling     SUBJECTIVE:   Patient stated, \"It still feels stuck every once in a while but nothing else is really challenging about it.     OBJECTIVE:   Cognitive and Communication Status:  Neurologic State: Alert  Orientation Level: Oriented X4  Cognition: Follows commands, Appropriate safety awareness  Perception: Appears intact  Perseveration: No perseveration noted  Safety/Judgement: Awareness of environment  Dysphagia Treatment:  Oral Assessment:  Oral Assessment  Labial: No impairment  Dentition: Intact  Oral Hygiene: oral mucosa moist and clear of secretions  Lingual: No impairment  Velum: No impairment  Mandible: No impairment  P.O. Trials:  Patient Position: upright in bed  Vocal quality prior to P.O.: No impairment  Consistency Presented: Solid; Thin liquid  How Presented: Self-fed/presented;Straw     Bolus Acceptance: No impairment  Bolus Formation/Control: No impairment     Propulsion: No impairment  Oral Residue: None  Initiation of Swallow: No impairment  Laryngeal Elevation: Functional  Aspiration Signs/Symptoms: None  Pharyngeal Phase Characteristics: No impairment, issues, or problems   Effective Modifications: None    Pain:  Pain Scale 1: Numeric (0 - 10)  Pain Intensity 1: 7  Pain Location 1: Neck  After treatment:   []              Patient left in no apparent distress sitting up in chair  []              Patient left in no apparent distress in bed  [x]              Call bell left within reach  [x]              Nursing notified  []              Caregiver present  []              Bed alarm activated    COMMUNICATION/EDUCATION:     The patients plan of care including recommendations, planned interventions, and recommended diet changes were discussed with: Registered Nurse. [x]              Posted safety precautions in patient's room.     KRYSTYNA Arias  Time Calculation: 10 mins

## 2019-11-13 ENCOUNTER — HOSPITAL ENCOUNTER (OUTPATIENT)
Dept: MAMMOGRAPHY | Age: 44
Discharge: HOME | End: 2019-11-13
Attending: OBSTETRICS & GYNECOLOGY
Payer: COMMERCIAL

## 2019-11-13 DIAGNOSIS — Z12.39 SCREENING BREAST EXAMINATION: ICD-10-CM

## 2019-11-13 PROCEDURE — 77063 BREAST TOMOSYNTHESIS BI: CPT

## 2019-11-25 ENCOUNTER — HOSPITAL ENCOUNTER (OUTPATIENT)
Dept: MAMMOGRAPHY | Age: 44
Discharge: HOME | End: 2019-11-25
Attending: OBSTETRICS & GYNECOLOGY
Payer: COMMERCIAL

## 2019-11-25 DIAGNOSIS — R92.8 ABNORMAL MAMMOGRAM OF LEFT BREAST: ICD-10-CM

## 2019-11-25 PROCEDURE — 76642 ULTRASOUND BREAST LIMITED: CPT

## 2019-12-06 ENCOUNTER — OFFICE VISIT (OUTPATIENT)
Dept: INTERNAL MEDICINE CLINIC | Age: 44
End: 2019-12-06

## 2019-12-06 VITALS
TEMPERATURE: 98.8 F | BODY MASS INDEX: 29.02 KG/M2 | HEIGHT: 64 IN | WEIGHT: 170 LBS | RESPIRATION RATE: 16 BRPM | DIASTOLIC BLOOD PRESSURE: 76 MMHG | HEART RATE: 62 BPM | SYSTOLIC BLOOD PRESSURE: 130 MMHG

## 2019-12-06 DIAGNOSIS — I10 ESSENTIAL HYPERTENSION: Primary | ICD-10-CM

## 2019-12-06 DIAGNOSIS — Z98.890 H/O CERVICAL SPINE SURGERY: ICD-10-CM

## 2019-12-06 DIAGNOSIS — R10.13 EPIGASTRIC PAIN: ICD-10-CM

## 2019-12-06 DIAGNOSIS — Z23 ENCOUNTER FOR IMMUNIZATION: ICD-10-CM

## 2019-12-06 NOTE — PROGRESS NOTES
Chief Complaint   Patient presents with   [facility] Care     1. Have you been to the ER, urgent care clinic since your last visit? Hospitalized since your last visit? No    2. Have you seen or consulted any other health care providers outside of the [address] [place] [address] since your last visit? Include any pap smears or colon screening. Kari     Candis Ring  is a 53 y.o.  female  who present for influenza immunizations/injections. He/she denies any symptoms , reactions or allergies that would exclude them from being immunized today. Risks and adverse reactions were discussed and the VIS was given if applicable to them. All questions were addressed. He/She was observed for 5 min post injection. There were no reactions observed.     Link Krish, ANTONN

## 2019-12-06 NOTE — PROGRESS NOTES
Subjective: Kary Walsh is a 53 y.o. female who presents today for   Chief Complaint   Patient presents with    [facility]   hypertension- currently on no medication, she has been trying to wean off bp meds  Migraine history  Prediabetic  DDD  Spinal surgery recently(spine anterior cervical fusion)  Some mild epigastric pain and nausea after eating- she has been taking a lot of ibuprofen for pain, she does take prilosec      Patient Active Problem List    Diagnosis Date Noted    Compression of spinal cord ([facility] [address] 11/03/2019    Weakness 11/01/2019    DDD (degenerative disc disease), cervical 10/18/2016    Hypertension     Headache     Arrhythmia     Dense breasts     Sinus congestion      Current Outpatient Medications   Medication Sig Dispense Refill    amitriptyline (ELAVIL) 10 mg tablet Take 10 mg by mouth nightly.  herbal drugs (COLON HERBAL CLEANSER PO) Take 1 Cap by mouth daily.  hydroCHLOROthiazide (MICROZIDE) 12.5 mg capsule TAKE ONE CAPSULE BY MOUTH EVERY DAY 90 Cap 1    omeprazole (PRILOSEC) 20 mg capsule Take 20 mg by mouth daily. Indications: GASTROESOPHAGEAL REFLUX      multivitamin (ONE A DAY) tablet Take 1 Tab by mouth daily. Indications: VITAMIN DEFICIENCY PREVENTION      senna-docusate (PERICOLACE) 8.6-50 mg per tablet Take 1 Tab by mouth two (2) times daily as needed for Constipation. 10 Tab 0    OTHER Outpatient PT/OT 1 Units 0    cyclobenzaprine (FLEXERIL) 10 mg tablet Take 10 mg by mouth two (2) times a day.  aspirin-acetaminophen-caffeine (EXCEDRIN ES) 250-250-65 mg per tablet Take 1 Tab by mouth daily as needed for Headache.  magnesium oxide (MAG-OX) 400 mg tablet Take 1 Tab by mouth nightly. 90 Tab 1    ZOLMitriptan (ZOMIG) 2.5 mg spry 1 Spray by Nasal route once as needed for up to 1 dose.  4 Each 3     Allergies   Allergen Reactions    Metronidazole Rash    Stadol [Butorphanol Tartrate] Palpitations    Butorphanol Palpitations     Past Medical History:   Diagnosis Date    Dense breasts     GERD (gastroesophageal reflux disease)     Headache     Hypertension     Ill-defined condition     heart murmer    Nausea & vomiting     Sinus congestion      Past Surgical History:   Procedure Laterality Date    HX BACK SURGERY  2016    HX BUNIONECTOMY Bilateral     HX GYN  2002    D&C    HX OTHER SURGICAL  2016    sinus    US GUIDE ASP BREAST RT CYST W NDL Right 4 years ago ? Benign   recent spinal surgery  Family History   Problem Relation Age of Onset    Breast Cancer Mother 79    Diabetes Mother     Hypertension Mother     Stroke Mother     Cancer Father         Prostate    Hypertension Father     Hypertension Sister     Hypertension Brother      Social History     Tobacco Use    Smoking status: Never Smoker    Smokeless tobacco: Never Used   Substance Use Topics    Alcohol use: Yes     Alcohol/week: 1.0 standard drinks     Types: 1 Glasses of wine per week        Review of Systems    A comprehensive review of systems was negative except for that written in the HPI. Objective:     Visit Vitals  /76   Pulse 62   Temp 98.8 °F (37.1 °C) (Oral)   Resp 16   Ht 5' 4\" (1.626 m)   Wt 170 lb (77.1 kg)   LMP 11/06/2019 (Approximate)   BMI 29.18 kg/m²     General:  Alert, cooperative, no distress, appears stated age. Head:  Normocephalic, without obvious abnormality, atraumatic. Eyes:  Conjunctivae/corneas clear. PERRL, EOMs intact. Ears:  Normal TMs and external ear canals both ears. Nose: Nares normal. Septum midline. Mucosa normal. No drainage or sinus tenderness. Throat: Lips, mucosa, and tongue normal. Teeth and gums normal.   Neck: Supple, symmetrical, trachea midline, no adenopathy, thyroid: no enlargement/tenderness/nodules, no carotid bruit and no JVD. Back:   Symmetric, no curvature. ROM normal. No CVA tenderness. Lungs:   Clear to auscultation bilaterally. Chest wall:  No tenderness or deformity.    Heart: Regular rate and rhythm, S1, S2 normal, no murmur, click, rub or gallop. Abdomen:   Soft, noted  epigastric and LUQ tenderness. Bowel sounds normal. No masses,  No organomegaly. Extremities: Extremities normal, atraumatic, no cyanosis or edema. Pulses: 2+ and symmetric all extremities. Skin: Skin color, texture, turgor normal. No rashes or lesions. Lymph nodes: Cervical, supraclavicular, and axillary nodes normal.   Neurologic: CNII-XII intact. Normal strength, sensation and reflexes throughout. Assessment/Plan:       ICD-10-CM ICD-9-CM    1. Essential hypertension I10 401.9 Continue HCTZ   2. H/O cervical spine surgery Z98.890 V45.89 Follow up with surgeon   3. Epigastric pain R10.13 789.06 May be GERD or gastritis, discussed bland diet, continue PPI, avoid NSAIDS  Will need to see GI if symptoms persist   4. Encounter for immunization Z23 V03.89 INFLUENZA VIRUS VAC QUAD,SPLIT,PRESV FREE SYRINGE IM        Will need complete physical     Advised her to call back or return to office if symptoms worsen/change/persist.  Discussed expected course/resolution/complications of diagnosis in detail with patient. Medication risks/benefits/costs/interactions/alternatives discussed with patient. She was given an after visit summary which includes diagnoses, current medications, & vitals. She expressed understanding with the diagnosis and plan.

## 2020-03-23 RX ORDER — AMITRIPTYLINE HYDROCHLORIDE 10 MG/1
TABLET, FILM COATED ORAL
Qty: 60 TAB | Refills: 4 | Status: SHIPPED | OUTPATIENT
Start: 2020-03-23 | End: 2020-08-11

## 2020-08-10 ENCOUNTER — TELEPHONE (OUTPATIENT)
Dept: INTERNAL MEDICINE CLINIC | Age: 45
End: 2020-08-10

## 2020-08-10 ENCOUNTER — VIRTUAL VISIT (OUTPATIENT)
Dept: INTERNAL MEDICINE CLINIC | Age: 45
End: 2020-08-10
Payer: COMMERCIAL

## 2020-08-10 DIAGNOSIS — I10 ESSENTIAL HYPERTENSION: Primary | ICD-10-CM

## 2020-08-10 DIAGNOSIS — U07.1 COVID-19: ICD-10-CM

## 2020-08-10 PROCEDURE — 99214 OFFICE O/P EST MOD 30 MIN: CPT | Performed by: INTERNAL MEDICINE

## 2020-08-10 RX ORDER — HYDROCHLOROTHIAZIDE 25 MG/1
25 TABLET ORAL DAILY
Qty: 30 TAB | Refills: 3 | Status: SHIPPED | OUTPATIENT
Start: 2020-08-10 | End: 2020-08-31 | Stop reason: DRUGHIGH

## 2020-08-10 RX ORDER — AMLODIPINE BESYLATE 5 MG/1
5 TABLET ORAL DAILY
Qty: 30 TAB | Refills: 3 | Status: SHIPPED | OUTPATIENT
Start: 2020-08-10 | End: 2020-08-31 | Stop reason: DRUGHIGH

## 2020-08-10 NOTE — TELEPHONE ENCOUNTER
Received call from patient stating that she was advised to contact office with blood pressure reading per Dr. Mireya Allen. Pt B\P 166/108. Please advise.

## 2020-08-10 NOTE — PROGRESS NOTES
Chief Complaint   Patient presents with    Concern For VWPYW-17 (Coronavirus)     Pt presents to discuss elevated blood pressure since tested positive for covid on 8/7/20.    1. Have you been to the ER, urgent care clinic since your last visit? Hospitalized since your last visit? Yes, seen in [facility] clinic on 8/7/20.    2. Have you seen or consulted any other health care providers outside of the [address] [place] [address] since your last visit? Include any pap smears or colon screening.  No

## 2020-08-10 NOTE — LETTER
NOTIFICATION OF RETURN TO WORK / SCHOOL 
 
8/10/2020 Ms. Sol Ramsay 
[address] [facility] [address] To Whom It May Concern: 
 
Sol Ramsay was under the care of [address] [place] [address] and Primary Care. Please excuse from work 8/10/20 through 8/12/20 due to medical issues. If there are questions or concerns please have the patient contact our office. Sincerely, Loni Poole MD

## 2020-08-10 NOTE — PROGRESS NOTES
Fatoumata Koo is a 53 y.o. female who was seen by synchronous (real-time) audio-video technology on 8/10/2020 for Concern For COVID-19 (Coronavirus)        Assessment & Plan:   Diagnoses and all orders for this visit:    1. Essential hypertension    2. COVID-19    Other orders  -     hydroCHLOROthiazide (HYDRODIURIL) 25 mg tablet; Take 1 Tab by mouth daily. -     amLODIPine (NORVASC) 5 mg tablet; Take 1 Tab by mouth daily. Follow up with me in one week  Buy new home monitor and check BP daily  If BP remains elevated or symptoms worsen go to ER      Subjective:     July 29 was feeling allergy type symptoms or a cold. The next couple of days nose started burning and would not stop. She went to [facility] was prescribed flonase and claritin. She meanwhile was notified by [facility] covid positive test done Aug 2. Since that time she has noticed her bp has been high. Often goes up when sick but not this high. Last night 167/94  Head is thumping, feels a little lightheaded, side of head hurting. She has started back taking hctz 12.5 mg daily which she took in the past. She takes elavil at night to sleep    Requests mon- wed note excuse for work      Addendum:     Patient went to [facility] today and BP elevated   166/108      Prior to Admission medications    Medication Sig Start Date End Date Taking? Authorizing Provider   amitriptyline (ELAVIL) 10 mg tablet TAKE 1 TAB AT BEDTIME, INCREASE TO 2 TABS AT BEDTIME AFTER 2 WEEKS IF NO IMPROVMENT 3/23/20  Yes Nikki Vogel, DO   herbal drugs (COLON HERBAL CLEANSER PO) Take 1 Cap by mouth daily. Yes Provider, Historical   hydroCHLOROthiazide (MICROZIDE) 12.5 mg capsule TAKE ONE CAPSULE BY MOUTH EVERY DAY 5/6/19  Yes Beau Leiva MD   omeprazole (PRILOSEC) 20 mg capsule Take 20 mg by mouth daily. Indications: GASTROESOPHAGEAL REFLUX   Yes Provider, Historical   multivitamin (ONE A DAY) tablet Take 1 Tab by mouth daily.  Indications: VITAMIN DEFICIENCY PREVENTION Yes Provider, Historical         Review of Systems   Constitutional: Negative for weight loss. Eyes: Negative for blurred vision. Respiratory: Negative for shortness of breath. Cardiovascular: Negative for chest pain and leg swelling. Genitourinary: Negative for frequency and urgency. Musculoskeletal: Negative for joint pain. Neurological: Negative for headaches.         Objective:     Patient-Reported Vitals 8/10/2020   Patient-Reported Weight 170lb        [INSTRUCTIONS:  \"[x]\" Indicates a positive item  \"[]\" Indicates a negative item  -- DELETE ALL ITEMS NOT EXAMINED]    Constitutional: [x] Appears well-developed and well-nourished [x] No apparent distress      [] Abnormal -     Mental status: [x] Alert and awake  [x] Oriented to person/place/time [x] Able to follow commands    [] Abnormal -     Eyes:   EOM    [x]  Normal    [] Abnormal -   Sclera  [x]  Normal    [] Abnormal -          Discharge [x]  None visible   [] Abnormal -     HENT: [x] Normocephalic, atraumatic  [] Abnormal -   [x] Mouth/Throat: Mucous membranes are moist    External Ears [x] Normal  [] Abnormal -    Neck: [x] No visualized mass [] Abnormal -     Pulmonary/Chest: [x] Respiratory effort normal   [x] No visualized signs of difficulty breathing or respiratory distress        [] Abnormal -      Musculoskeletal:   [x] Normal gait with no signs of ataxia         [x] Normal range of motion of neck        [] Abnormal -     Neurological:        [x] No Facial Asymmetry (Cranial nerve 7 motor function) (limited exam due to video visit)          [x] No gaze palsy        [] Abnormal -          Skin:        [x] No significant exanthematous lesions or discoloration noted on facial skin         [] Abnormal -            Psychiatric:       [x] Normal Affect [] Abnormal -        [x] No Hallucinations    Other pertinent observable physical exam findings:-        We discussed the expected course, resolution and complications of the diagnosis(es) in detail. Medication risks, benefits, costs, interactions, and alternatives were discussed as indicated. I advised her to contact the office if her condition worsens, changes or fails to improve as anticipated. She expressed understanding with the diagnosis(es) and plan. Naty Carvajal, who was evaluated through a patient-initiated, synchronous (real-time) audio-video encounter, and/or her healthcare decision maker, is aware that it is a billable service, with coverage as determined by her insurance carrier. She provided verbal consent to proceed: Yes, and patient identification was verified. It was conducted pursuant to the emergency declaration under the [address] [place] [address], [address] [place] [address] authority and the [employer] General Act. A caregiver was present when appropriate. Ability to conduct physical exam was limited. I was at home. The patient was at home.       Dian Izaguirre MD

## 2020-08-11 RX ORDER — AMITRIPTYLINE HYDROCHLORIDE 10 MG/1
TABLET, FILM COATED ORAL
Qty: 60 TAB | Refills: 4 | Status: SHIPPED | OUTPATIENT
Start: 2020-08-11 | End: 2020-09-22

## 2020-08-12 NOTE — TELEPHONE ENCOUNTER
Patient called, verified and notified. She stated she has \"just recently tested positive for COVID 19\" and would prefer to wait to follow up in a month or so. She scheduled a follow up for 09/22/2020.

## 2020-08-17 ENCOUNTER — VIRTUAL VISIT (OUTPATIENT)
Dept: INTERNAL MEDICINE CLINIC | Age: 45
End: 2020-08-17
Payer: COMMERCIAL

## 2020-08-17 DIAGNOSIS — I10 ESSENTIAL HYPERTENSION: ICD-10-CM

## 2020-08-17 DIAGNOSIS — Z79.899 ENCOUNTER FOR LONG-TERM (CURRENT) USE OF OTHER MEDICATIONS: ICD-10-CM

## 2020-08-17 DIAGNOSIS — G43.911 INTRACTABLE MIGRAINE WITH STATUS MIGRAINOSUS, UNSPECIFIED MIGRAINE TYPE: ICD-10-CM

## 2020-08-17 DIAGNOSIS — U07.1 COVID-19: Primary | ICD-10-CM

## 2020-08-17 PROCEDURE — 99213 OFFICE O/P EST LOW 20 MIN: CPT | Performed by: FAMILY MEDICINE

## 2020-08-17 RX ORDER — SUMATRIPTAN 50 MG/1
50 TABLET, FILM COATED ORAL
Qty: 10 TAB | Refills: 0 | Status: SHIPPED | OUTPATIENT
Start: 2020-08-17 | End: 2020-08-17

## 2020-08-17 NOTE — PROGRESS NOTES
Chief Complaint   Patient presents with    Hypertension     Patient states her blood pressure is elevated and she tested postive for Covid19.     1. Have you been to the ER, urgent care clinic since your last visit? Hospitalized since your last visit? No    2. Have you seen or consulted any other health care providers outside of the [address] [place] [address] since your last visit? Include any pap smears or colon screening.  No

## 2020-08-17 NOTE — PROGRESS NOTES
Damion Julian is a 53 y.o. female who was seen by synchronous (real-time) audio-video technology on 8/17/2020 for Hypertension (Patient states her blood pressure is elevated and she tested postive for Covid19.)        Assessment & Plan:   Diagnoses and all orders for this visit:    1. COVID-19    2. Intractable migraine with status migrainosus, unspecified migraine type  -     SUMAtriptan (IMITREX) 50 mg tablet; Take 1 Tab by mouth once as needed for Migraine for up to 1 dose. Repeat 1 tab po in 2 hr prn migraine only    Return for virtual visit before returning to work    3. Essential hypertension-continue current antihypertensive regimen and lifestyle modification    4. Encounter for long-term (current) use of other medications-stable    Advised patient to call back or return to office if symptoms worsen/change/persist.  Discussed expected course/resolution/complications of diagnosis in detail with patient. Medication risks/benefits/costs/interactions/alternatives discussed with patient    I have discussed the diagnosis with the patient and the intended plan as seen in the  orders above. The patient understands and agrees with the plan. All questions the patient posed were answered. Patient labs and/or xrays were reviewed  Past records were reviewed. Subjective:     53year-old established patient of Doug Samson MD presents for COVID-19 infection follow-up and elevated blood pressure. Patient tested positive for COVID-19 infection on August 2, 2020. She took the test after she developed fever , fatigue and muscle aches. She was told to quarantine. During this visit she is temporarily parked in a vehicle with her daughter who also has COVID-19 infection. Patient does not endorse coughing or shortness of breath. Patient is working from home. She finds it difficult to keep up with her work responsibilities with the COVID infection.   Patient requests a work excuse extending between 8/18/20 and 8/31/20. She hopes to return to work on 9/1/2020. Antihypertensive medication dose was increased at last visit with her PCP for blood pressures ranging 160 - 205/92115. Current blood pressure is 137/89. Patient reports increased migraines. She reports compliance with amitriptyline at bedtime. She needs a refill on sumatriptan. Prior to Admission medications    Medication Sig Start Date End Date Taking? Authorizing Provider   amitriptyline (ELAVIL) 10 mg tablet TAKE 1 TAB AT BEDTIME, INCREASE TO 2 TABS AT BEDTIME AFTER 2 WEEKS IF NO IMPROVMENT 8/11/20   Nikki Vogel,    hydroCHLOROthiazide (HYDRODIURIL) 25 mg tablet Take 1 Tab by mouth daily. 8/10/20   Verner Ouch, MD   amLODIPine (NORVASC) 5 mg tablet Take 1 Tab by mouth daily. 8/10/20   Verner Ouch, MD   herbal drugs (COLON HERBAL CLEANSER PO) Take 1 Cap by mouth daily. Provider, Historical   hydroCHLOROthiazide (MICROZIDE) 12.5 mg capsule TAKE ONE CAPSULE BY MOUTH EVERY DAY 5/6/19   Denton Lombardo MD   omeprazole (PRILOSEC) 20 mg capsule Take 20 mg by mouth daily. Indications: GASTROESOPHAGEAL REFLUX    Provider, Historical   multivitamin (ONE A DAY) tablet Take 1 Tab by mouth daily.  Indications: VITAMIN DEFICIENCY PREVENTION    Provider, Historical         ROS    Objective:     Patient-Reported Vitals 8/10/2020   Patient-Reported Weight 170lb        [INSTRUCTIONS:  \"[x]\" Indicates a positive item  \"[]\" Indicates a negative item  -- DELETE ALL ITEMS NOT EXAMINED]    Constitutional: [x] Appears well-developed and well-nourished [x] No apparent distress      [] Abnormal -     Mental status: [x] Alert and awake  [x] Oriented to person/place/time [x] Able to follow commands    [] Abnormal -     Eyes:   EOM    [x]  Normal    [] Abnormal -   Sclera  [x]  Normal    [] Abnormal -          Discharge [x]  None visible   [] Abnormal -     HENT: [x] Normocephalic, atraumatic  [] Abnormal -   [x] Mouth/Throat: Mucous membranes are moist    External Ears [x] Normal  [] Abnormal -    Neck: [x] No visualized mass [] Abnormal -     Pulmonary/Chest: [x] Respiratory effort normal   [x] No visualized signs of difficulty breathing or respiratory distress        [] Abnormal -      Musculoskeletal:   [x] Normal gait with no signs of ataxia         [x] Normal range of motion of neck        [] Abnormal -     Neurological:        [x] No Facial Asymmetry (Cranial nerve 7 motor function) (limited exam due to video visit)          [x] No gaze palsy        [] Abnormal -          Skin:        [x] No significant exanthematous lesions or discoloration noted on facial skin         [] Abnormal -            Psychiatric:       [x] Normal Affect [] Abnormal -        [x] No Hallucinations    Other pertinent observable physical exam findings:-        We discussed the expected course, resolution and complications of the diagnosis(es) in detail. Medication risks, benefits, costs, interactions, and alternatives were discussed as indicated. I advised her to contact the office if her condition worsens, changes or fails to improve as anticipated. She expressed understanding with the diagnosis(es) and plan. Jennifer Lopez, who was evaluated through a patient-initiated, synchronous (real-time) audio-video encounter, and/or her healthcare decision maker, is aware that it is a billable service, with coverage as determined by her insurance carrier. She provided verbal consent to proceed: Yes, and patient identification was verified. It was conducted pursuant to the emergency declaration under the [address] [place] [address], [address] [place] [address] authority and the [employer] General Act. A caregiver was present when appropriate. Ability to conduct physical exam was limited. I was in the office. The patient was in her vehicle.       Nitin Seals MD

## 2020-08-17 NOTE — LETTER
8/17/2020 3:17 PM 
 
 
 
 
RE: Ms. Sophia De Souza 
       [address] [facility] [id number] To whom it may concern: Ms. Sophia De Souza has been diagnosed with a serious medical condition. I have advised her to remain out of work from August 18, 2020 through August 31, 2020. She will be reevaluated to return to work prior to the end of her disability period and will be released at that time upon sufficient recovery. Sincerely, Teresita Grimm MD

## 2020-08-31 ENCOUNTER — VIRTUAL VISIT (OUTPATIENT)
Dept: INTERNAL MEDICINE CLINIC | Age: 45
End: 2020-08-31
Payer: COMMERCIAL

## 2020-08-31 DIAGNOSIS — U07.1 COVID-19: Primary | ICD-10-CM

## 2020-08-31 DIAGNOSIS — I10 ESSENTIAL HYPERTENSION: ICD-10-CM

## 2020-08-31 DIAGNOSIS — G43.911 INTRACTABLE MIGRAINE WITH STATUS MIGRAINOSUS, UNSPECIFIED MIGRAINE TYPE: ICD-10-CM

## 2020-08-31 PROCEDURE — 99213 OFFICE O/P EST LOW 20 MIN: CPT | Performed by: FAMILY MEDICINE

## 2020-08-31 RX ORDER — AMLODIPINE BESYLATE 10 MG/1
10 TABLET ORAL DAILY
Qty: 90 TAB | Refills: 1 | Status: SHIPPED | OUTPATIENT
Start: 2020-08-31

## 2020-08-31 NOTE — LETTER
8/31/2020 11:14 AM 
 
Ms. Sol Ramsay 
[address] [facility] [id number] Sincerely, Ashli Marie MD

## 2020-08-31 NOTE — PROGRESS NOTES
Chief Complaint   Patient presents with    Concern For SZHGO-16 (Coronavirus)     Patient states she is following up for Covid 19     1. Have you been to the ER, urgent care clinic since your last visit? Hospitalized since your last visit? No    2. Have you seen or consulted any other health care providers outside of the [address] [place] [address] since your last visit? Include any pap smears or colon screening.  No

## 2020-08-31 NOTE — PROGRESS NOTES
Maida Waletrs is a 53 y.o. female who was seen by synchronous (real-time) audio-video technology on 8/31/2020 for Concern For COVID-19 (Coronavirus) (Patient states she is following up for Covid 19)        Assessment & Plan:   Diagnoses and all orders for this visit:    1. COVID-19              Resolved    2. Essential hypertension  -     Increase amLODIPine (NORVASC) to 10 mg tablet; Take 1 Tab by mouth daily. Indication: high blood pressure    3. Intractable migraine with status migrainosus, unspecified migraine type            Patient will continue Elavil prophylaxis and Imitrex for abortive therapy    I have discussed the diagnosis with the patient and the intended plan as seen in the  orders above. The patient understands and agees with the plan. All questions the patient posed were answered. Patient labs and/or xrays were reviewed  Past records were reviewed. Medication Side Effects and Warnings were discussed with patient,  Patient Labs were reviewed and or requested, and  Patient Past Records were reviewed and or requested  yes        RTO 2 weeks for blood pressure follow up        Subjective:   A 53year-old established patient for follow-up of COVID-19 infection, hypertension and intractable migraine. Patient tested positive for COVID-19 infection and during the week of August 17, 2020. She quarantined and retested on 8/26/2020. She was negative for COVID-19 on retest.  Patient does not endorse worrisome symptoms of infectious disease. She reports that she is ready to return to work on September 1, 2020. Patient has monitored home blood pressures on amlodipine 5 mg and HCTZ 25 mg (taking 2 12.5 mg tabs). Recent blood pressures are 152/95 and 143/90. Patient reported that her intractable migraine resolved. She did use Imitrex. She is maintained on Elavil as prophylaxis. Prior to Admission medications    Medication Sig Start Date End Date Taking?  Authorizing Provider   amitriptyline (ELAVIL) 10 mg tablet TAKE 1 TAB AT BEDTIME, INCREASE TO 2 TABS AT BEDTIME AFTER 2 WEEKS IF NO IMPROVMENT 8/11/20  Yes Nikki Vogel DO   hydroCHLOROthiazide (HYDRODIURIL) 25 mg tablet Take 1 Tab by mouth daily. 8/10/20  Yes Jaspreet Huizar MD   amLODIPine (NORVASC) 5 mg tablet Take 1 Tab by mouth daily. 8/10/20  Yes Jaspreet Huizar MD   herbal drugs (COLON HERBAL CLEANSER PO) Take 1 Cap by mouth daily. Yes Provider, Historical   hydroCHLOROthiazide (MICROZIDE) 12.5 mg capsule TAKE ONE CAPSULE BY MOUTH EVERY DAY 5/6/19  Yes Amilcar Michael MD   omeprazole (PRILOSEC) 20 mg capsule Take 20 mg by mouth daily. Indications: GASTROESOPHAGEAL REFLUX   Yes Provider, Historical   multivitamin (ONE A DAY) tablet Take 1 Tab by mouth daily. Indications: VITAMIN DEFICIENCY PREVENTION   Yes Provider, Historical     Reviewed PmHx, RxHx, FmHx, SocHx, AllgHx and updated and dated in the chart. Review of Systems   Constitutional: Negative. HENT: Negative. Respiratory: Negative. Cardiovascular: Negative. Gastrointestinal: Negative. Skin: Negative. Neurological: Positive for headaches.        Objective:     Patient-Reported Vitals 8/10/2020   Patient-Reported Weight 170lb        [INSTRUCTIONS:  \"[x]\" Indicates a positive item  \"[]\" Indicates a negative item  -- DELETE ALL ITEMS NOT EXAMINED]    Constitutional: [x] Appears well-developed and well-nourished [x] No apparent distress      [] Abnormal -     Mental status: [x] Alert and awake  [x] Oriented to person/place/time [x] Able to follow commands    [] Abnormal -     Eyes:   EOM    [x]  Normal    [] Abnormal -   Sclera  [x]  Normal    [] Abnormal -          Discharge [x]  None visible   [] Abnormal -     HENT: [x] Normocephalic, atraumatic  [] Abnormal -   [x] Mouth/Throat: Mucous membranes are moist    External Ears [x] Normal  [] Abnormal -    Neck: [x] No visualized mass [] Abnormal -     Pulmonary/Chest: [x] Respiratory effort normal   [x] No visualized signs of difficulty breathing or respiratory distress        [] Abnormal -      Musculoskeletal:   [] Normal gait with no signs of ataxia         [x] Normal range of motion of neck        [] Abnormal -     Neurological:        [x] No Facial Asymmetry (Cranial nerve 7 motor function) (limited exam due to video visit)          [x] No gaze palsy        [] Abnormal -          Skin:        [x] No significant exanthematous lesions or discoloration noted on facial skin         [] Abnormal -            Psychiatric:       [x] Normal Affect [] Abnormal -        [x] No Hallucinations    Other pertinent observable physical exam findings:-        We discussed the expected course, resolution and complications of the diagnosis(es) in detail. Medication risks, benefits, costs, interactions, and alternatives were discussed as indicated. I advised her to contact the office if her condition worsens, changes or fails to improve as anticipated. She expressed understanding with the diagnosis(es) and plan. Maddison Roberts, who was evaluated through a patient-initiated, synchronous (real-time) audio-video encounter, and/or her healthcare decision maker, is aware that it is a billable service, with coverage as determined by her insurance carrier. She provided verbal consent to proceed: Yes, and patient identification was verified. It was conducted pursuant to the emergency declaration under the [address] [place] [address] authority and the [employer] General Act. A caregiver was present when appropriate. Ability to conduct physical exam was limited. I was in the office. The patient was at home.       Dayan Zavaleta MD

## 2020-08-31 NOTE — LETTER
8/31/2020 11:35 AM 
 
 
 
RE: Ms. Devin Alcantar 
       [address] [facility] [id number] To whom it may concern: Ms. Devin Alcantar has been under my care from August 17, 2020 through August 31, 2020. She is released to resume unrestricted work activities on 9/1/2020. Sincerely, Aashish Felder MD

## 2020-09-16 ENCOUNTER — VIRTUAL VISIT (OUTPATIENT)
Dept: INTERNAL MEDICINE CLINIC | Age: 45
End: 2020-09-16
Payer: COMMERCIAL

## 2020-09-16 DIAGNOSIS — I10 ESSENTIAL HYPERTENSION: Primary | ICD-10-CM

## 2020-09-16 DIAGNOSIS — G43.809 OTHER MIGRAINE WITHOUT STATUS MIGRAINOSUS, NOT INTRACTABLE: ICD-10-CM

## 2020-09-16 PROCEDURE — 99214 OFFICE O/P EST MOD 30 MIN: CPT | Performed by: INTERNAL MEDICINE

## 2020-09-16 NOTE — PROGRESS NOTES
Chief Complaint   Patient presents with    Hypertension     1. Have you been to the ER, urgent care clinic since your last visit? Hospitalized since your last visit? No    2. Have you seen or consulted any other health care providers outside of the [address] [place] [address] since your last visit? Include any pap smears or colon screening.  No

## 2020-09-16 NOTE — PROGRESS NOTES
Dennie Patricia is a 53 y.o. female who was seen by synchronous (real-time) audio-video technology on 9/16/2020 for Hypertension        Assessment & Plan:   Diagnoses and all orders for this visit:    1. Essential hypertension  Continue current management    2. Other migraine without status migrainosus, not intractable  Continue current meds  Follow up with neurologist          Subjective:   Patient in today for follow up    COVID positive: Had COVID test neg, symptom free    Hypertension: Amlodipine 10 and HCTZ 12.5  She has tried to exercise and eating low salt  Her last BP reading was 130/87      Migraines: patient reports have improved  Takes sumatriptan regularly  She continues elavil and she is seeing neurologist    She is back at work and is working virtually    Due for physical Dec 6      Prior to Admission medications    Medication Sig Start Date End Date Taking? Authorizing Provider   amLODIPine (NORVASC) 10 mg tablet Take 1 Tab by mouth daily. Indication: high blood pressure 8/31/20  Yes Zachary Brown MD   amitriptyline (ELAVIL) 10 mg tablet TAKE 1 TAB AT BEDTIME, INCREASE TO 2 TABS AT BEDTIME AFTER 2 WEEKS IF NO IMPROVMENT 8/11/20  Yes Nikki Vogel,    herbal drugs (COLON HERBAL CLEANSER PO) Take 1 Cap by mouth daily. Yes Provider, Historical   hydroCHLOROthiazide (MICROZIDE) 12.5 mg capsule TAKE ONE CAPSULE BY MOUTH EVERY DAY 5/6/19  Yes Gilda Christian MD   omeprazole (PRILOSEC) 20 mg capsule Take 20 mg by mouth daily. Indications: GASTROESOPHAGEAL REFLUX   Yes Provider, Historical   multivitamin (ONE A DAY) tablet Take 1 Tab by mouth daily. Indications: VITAMIN DEFICIENCY PREVENTION   Yes Provider, Historical         Review of Systems   Constitutional: Negative for weight loss. Eyes: Negative for blurred vision. Respiratory: Negative for shortness of breath. Cardiovascular: Negative for chest pain and leg swelling. Genitourinary: Negative for frequency and urgency. Musculoskeletal: Negative for joint pain. Neurological: Negative for headaches. Objective:     Patient-Reported Vitals 8/10/2020   Patient-Reported Weight 170lb        [INSTRUCTIONS:  \"[x]\" Indicates a positive item  \"[]\" Indicates a negative item  -- DELETE ALL ITEMS NOT EXAMINED]    Constitutional: [x] Appears well-developed and well-nourished [x] No apparent distress      [] Abnormal -     Mental status: [x] Alert and awake  [x] Oriented to person/place/time [x] Able to follow commands    [] Abnormal -     Eyes:   EOM    [x]  Normal    [] Abnormal -   Sclera  [x]  Normal    [] Abnormal -          Discharge [x]  None visible   [] Abnormal -     HENT: [x] Normocephalic, atraumatic  [] Abnormal -   [x] Mouth/Throat: Mucous membranes are moist    External Ears [x] Normal  [] Abnormal -    Neck: [x] No visualized mass [] Abnormal -     Pulmonary/Chest: [x] Respiratory effort normal   [x] No visualized signs of difficulty breathing or respiratory distress        [] Abnormal -      Musculoskeletal:   [x] Normal gait with no signs of ataxia         [x] Normal range of motion of neck        [] Abnormal -     Neurological:        [x] No Facial Asymmetry (Cranial nerve 7 motor function) (limited exam due to video visit)          [x] No gaze palsy        [] Abnormal -          Skin:        [x] No significant exanthematous lesions or discoloration noted on facial skin         [] Abnormal -            Psychiatric:       [x] Normal Affect [] Abnormal -        [x] No Hallucinations    Other pertinent observable physical exam findings:-        We discussed the expected course, resolution and complications of the diagnosis(es) in detail. Medication risks, benefits, costs, interactions, and alternatives were discussed as indicated. I advised her to contact the office if her condition worsens, changes or fails to improve as anticipated. She expressed understanding with the diagnosis(es) and plan.        Janessa Rainey, who was evaluated through a patient-initiated, synchronous (real-time) audio-video encounter, and/or her healthcare decision maker, is aware that it is a billable service, with coverage as determined by her insurance carrier. She provided verbal consent to proceed: Yes, and patient identification was verified. It was conducted pursuant to the emergency declaration under the [address] [place] [address] and the [employer] General Act. A caregiver was present when appropriate. Ability to conduct physical exam was limited. I was at home. The patient was at home.       Shannon Patrick MD

## 2020-09-22 ENCOUNTER — OFFICE VISIT (OUTPATIENT)
Dept: NEUROLOGY | Facility: CLINIC | Age: 45
End: 2020-09-22
Payer: COMMERCIAL

## 2020-09-22 VITALS
BODY MASS INDEX: 29.19 KG/M2 | WEIGHT: 171 LBS | HEART RATE: 72 BPM | DIASTOLIC BLOOD PRESSURE: 88 MMHG | SYSTOLIC BLOOD PRESSURE: 136 MMHG | OXYGEN SATURATION: 98 % | HEIGHT: 64 IN

## 2020-09-22 DIAGNOSIS — G43.709 CHRONIC MIGRAINE W/O AURA W/O STATUS MIGRAINOSUS, NOT INTRACTABLE: Primary | ICD-10-CM

## 2020-09-22 PROCEDURE — 99214 OFFICE O/P EST MOD 30 MIN: CPT | Performed by: PSYCHIATRY & NEUROLOGY

## 2020-09-22 RX ORDER — AMITRIPTYLINE HYDROCHLORIDE 10 MG/1
30 TABLET, FILM COATED ORAL
Qty: 270 TAB | Refills: 1 | Status: SHIPPED | OUTPATIENT
Start: 2020-09-22 | End: 2021-04-20

## 2020-09-22 RX ORDER — PROPRANOLOL HYDROCHLORIDE 80 MG/1
80 CAPSULE, EXTENDED RELEASE ORAL
Qty: 90 CAP | Refills: 1 | Status: SHIPPED | OUTPATIENT
Start: 2020-09-22 | End: 2022-03-04

## 2020-09-22 NOTE — PROGRESS NOTES
Chief Complaint   Patient presents with    Follow-up     migraines, \"they came back in full swing, migraines seemed to get worse after positive COVID diagnosis, since then I have been having them about twice a week. \"     Visit Vitals  /88 (BP 1 Location: Left arm, BP Patient Position: Sitting)   Pulse 72   Ht 5' 4\" (1.626 m)   Wt 77.6 kg (171 lb)   SpO2 98%   BMI 29.35 kg/m²

## 2020-09-22 NOTE — PROGRESS NOTES
Chief Complaint   Patient presents with    Follow-up     migraines, \"they came back in full swing, migraines seemed to get worse after positive COVID diagnosis, since then I have been having them about twice a week. \"       HPI          Mrs. Avery Jennings is a 75-year-old woman here to follow-up on chronic migraine. When I saw her last year she was doing very well with 1 or 2 severe headaches per month. Since I saw her last she did suffer COVID diagnosis in August manifesting as some sinus pressure but nothing acutely serious. She had increasing blood pressure during this time. As a result she has had more headaches migraine type up to 3 days/week diffuse throbbing pain with nausea and light sensitivity. She has been using over-the-counter medications. She has titrated amitriptyline to 30 mg nightly with some modicum of control but still having frequent breakthrough. She works from home now on the computer daily. Review of Systems   Eyes: Positive for photophobia. Negative for double vision. Gastrointestinal: Positive for nausea. Neurological: Positive for headaches. All other systems reviewed and are negative.       Past Medical History:   Diagnosis Date    Dense breasts     GERD (gastroesophageal reflux disease)     Headache     Hypertension     Ill-defined condition     heart murmer    Nausea & vomiting     Sinus congestion      Family History   Problem Relation Age of Onset    Breast Cancer Mother 79    Diabetes Mother     Hypertension Mother     Stroke Mother     Cancer Father         Prostate    Hypertension Father     Hypertension Sister     Hypertension Brother      Social History     Socioeconomic History    Marital status: SINGLE     Spouse name: Not on file    Number of children: Not on file    Years of education: Not on file    Highest education level: Not on file   Occupational History    Not on file   Social Needs    Financial resource strain: Not on file   [employer] insecurity     Worry: Not on file     Inability: Not on file    Transportation needs     Medical: Not on file     Non-medical: Not on file   Tobacco Use    Smoking status: Never Smoker    Smokeless tobacco: Never Used   Substance and Sexual Activity    Alcohol use: Yes     Alcohol/week: 1.0 standard drinks     Types: 1 Glasses of wine per week    Drug use: No    Sexual activity: Yes     Birth control/protection: Condom   Lifestyle    Physical activity     Days per week: Not on file     Minutes per session: Not on file    Stress: Not on file   Relationships    Social connections     Talks on phone: Not on file     Gets together: Not on file     Attends Sikhism service: Not on file     Active member of club or organization: Not on file     Attends meetings of clubs or organizations: Not on file     Relationship status: Not on file    Intimate partner violence     Fear of current or ex partner: Not on file     Emotionally abused: Not on file     Physically abused: Not on file     Forced sexual activity: Not on file   Other Topics Concern    Not on file   Social History Narrative    HABITS:  No alcohol abuse,  Occasional alcohol use. No cigarette or drug abuse.           SOCIAL HISTORY:  Patient is single. The mother of a daughter 16and 15years old. 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 Master's from [employer].          FAMILY HISTORY:  Father is 80 with a history of prostate cancer. Mother with diabetes and breast cancer, dyslipidemia and hypertension. Eight brothers and sisters. Patient is a , of twelve years duration. Her  was a principal and . Allergies   Allergen Reactions    Metronidazole Rash    Stadol [Butorphanol Tartrate] Palpitations    Butorphanol Palpitations         Current Outpatient Medications   Medication Sig    amitriptyline (ELAVIL) 10 mg tablet Take 3 Tabs by mouth nightly.     propranolol LA (INDERAL LA) 80 mg SR capsule Take 1 Cap by mouth nightly.  amLODIPine (NORVASC) 10 mg tablet Take 1 Tab by mouth daily. Indication: high blood pressure    herbal drugs (COLON HERBAL CLEANSER PO) Take 1 Cap by mouth daily.  hydroCHLOROthiazide (MICROZIDE) 12.5 mg capsule TAKE ONE CAPSULE BY MOUTH EVERY DAY    omeprazole (PRILOSEC) 20 mg capsule Take 20 mg by mouth daily. Indications: GASTROESOPHAGEAL REFLUX    multivitamin (ONE A DAY) tablet Take 1 Tab by mouth daily. Indications: VITAMIN DEFICIENCY PREVENTION     No current facility-administered medications for this visit. Neurologic Exam     Mental Status   WD/WN adult in NAD, normal grooming  VSS  A&O x 3    PERRL, nonicteric  Face is symmetric, tongue midline  Speech is fluent and clear  No limb ataxia. No abnl movements. Moving all extemities spontaneously and symmetric  Normal gait    CVS RRR  Lungs nonlabored  Skin is warm and dry         Visit Vitals  /88 (BP 1 Location: Left arm, BP Patient Position: Sitting)   Pulse 72   Ht 5' 4\" (1.626 m)   Wt 77.6 kg (171 lb)   SpO2 98%   BMI 29.35 kg/m²       Assessment and Plan   Diagnoses and all orders for this visit:    1. Chronic migraine w/o aura w/o status migrainosus, not intractable    Other orders  -     amitriptyline (ELAVIL) 10 mg tablet; Take 3 Tabs by mouth nightly. -     propranolol LA (INDERAL LA) 80 mg SR capsule; Take 1 Cap by mouth nightly. 66-year-old woman with chronic migraine having increasing frequency and intensity of migraines probably multifactorial in the setting of increasing work stressors, screen time, possibly COVID contribution. Exam unrevealing. Defer imaging. Continue amitriptyline 30 mg but I am going to add low-dose propranolol given the increase in blood pressure findings. If that fails or is poorly tolerated we could try Topamax or a CGRP inhibitor. Keep a headache log. We will see her in a few months.       I reviewed and decided to continue the current medications. This clinical note was dictated with an electronic dictation software that can make unintentional errors. If there are any questions, please contact me directly for clarification.       [address] [facility], [facility] [address]  Diplomate ABPN

## 2020-10-05 ENCOUNTER — TELEPHONE (OUTPATIENT)
Dept: NEUROLOGY | Facility: CLINIC | Age: 45
End: 2020-10-05

## 2020-10-05 NOTE — TELEPHONE ENCOUNTER
Patient called, verified and given Dr. Mirtha Bennett recommendations, she stated she will stop the propanolol and call back to update later in the week.

## 2020-10-05 NOTE — TELEPHONE ENCOUNTER
Okay stop taking propranolol. Yes if it is dropping her blood pressure it could cause dizziness. Please make sure she is drinking enough fluids during the day. Give it a few days and then call back to let us know how she is doing. We can try different medication for migraine if needed.

## 2020-12-15 RX ORDER — AMLODIPINE BESYLATE 5 MG/1
TABLET ORAL
Qty: 30 TAB | Refills: 3 | Status: SHIPPED | OUTPATIENT
Start: 2020-12-15 | End: 2021-05-02

## 2021-04-20 RX ORDER — AMITRIPTYLINE HYDROCHLORIDE 10 MG/1
TABLET, FILM COATED ORAL
Qty: 270 TAB | Refills: 1 | Status: SHIPPED | OUTPATIENT
Start: 2021-04-20 | End: 2022-03-04 | Stop reason: SDUPTHER

## 2021-05-02 RX ORDER — AMLODIPINE BESYLATE 5 MG/1
TABLET ORAL
Qty: 30 TAB | Refills: 3 | Status: SHIPPED | OUTPATIENT
Start: 2021-05-02 | End: 2021-11-08

## 2021-05-24 ENCOUNTER — VIRTUAL VISIT (OUTPATIENT)
Dept: INTERNAL MEDICINE CLINIC | Age: 46
End: 2021-05-24
Payer: COMMERCIAL

## 2021-05-24 DIAGNOSIS — M26.609 TEMPOROMANDIBULAR JOINT DISORDER (TMJ): ICD-10-CM

## 2021-05-24 DIAGNOSIS — M62.838 NECK MUSCLE SPASM: Primary | ICD-10-CM

## 2021-05-24 DIAGNOSIS — M54.9 UPPER BACK PAIN: ICD-10-CM

## 2021-05-24 PROCEDURE — 99213 OFFICE O/P EST LOW 20 MIN: CPT | Performed by: INTERNAL MEDICINE

## 2021-05-24 RX ORDER — CYCLOBENZAPRINE HCL 5 MG
5 TABLET ORAL
Qty: 30 TABLET | Refills: 0 | Status: SHIPPED | OUTPATIENT
Start: 2021-05-24 | End: 2021-07-28 | Stop reason: SDUPTHER

## 2021-05-24 NOTE — PROGRESS NOTES
Toni Venegas is a 50 y.o. female who was seen by synchronous (real-time) audio-video technology on 5/24/2021 for Neck Pain        Assessment & Plan:   Diagnoses and all orders for this visit:    1. Neck muscle spasm  -     REFERRAL TO ENT-OTOLARYNGOLOGY  -     cyclobenzaprine (FLEXERIL) 5 mg tablet; Take 1 Tablet by mouth three (3) times daily as needed for Muscle Spasm(s). -     REFERRAL TO PHYSICAL THERAPY    2. Upper back pain  -     cyclobenzaprine (FLEXERIL) 5 mg tablet; Take 1 Tablet by mouth three (3) times daily as needed for Muscle Spasm(s). -     REFERRAL TO PHYSICAL THERAPY    3. Temporomandibular joint disorder (TMJ)  -     REFERRAL TO ENT-OTOLARYNGOLOGY  -     cyclobenzaprine (FLEXERIL) 5 mg tablet; Take 1 Tablet by mouth three (3) times daily as needed for Muscle Spasm(s). -     REFERRAL TO PHYSICAL THERAPY            Subjective:   Patient had hysterectomy 2 weeks ago. She says she did too much activity on Friday and was bedbound over the weekend. She has taken tylenol, ibuprofen and hydrocodone    She says if she yawns she has a spasm mainly  on right side of face and right side of neck. Feels tight muscles. She does have a lot of stress in upper back . She does have a hx of TMJ  Sensation does not last long. It causes a  Lot of anxiety. Has been going on for a year. She is starting to notice more often recently    Prior to Admission medications    Medication Sig Start Date End Date Taking? Authorizing Provider   amLODIPine (NORVASC) 5 mg tablet TAKE 1 TABLET BY MOUTH EVERY DAY 5/2/21   Baron Marie Aguilera MD   amitriptyline (ELAVIL) 10 mg tablet TAKE 3 TABLETS BY MOUTH NIGHTLY 4/20/21   Nikki Vogel DO   propranolol LA (INDERAL LA) 80 mg SR capsule Take 1 Cap by mouth nightly. 9/22/20   Nikki Vogel DO   amLODIPine (NORVASC) 10 mg tablet Take 1 Tab by mouth daily.  Indication: high blood pressure 8/31/20   Alysia Godoy MD   herbal drugs (COLON HERBAL CLEANSER PO) Take 1 Cap by mouth daily. Provider, Historical   hydroCHLOROthiazide (MICROZIDE) 12.5 mg capsule TAKE ONE CAPSULE BY MOUTH EVERY DAY 5/6/19   Sulma Fountain MD   omeprazole (PRILOSEC) 20 mg capsule Take 20 mg by mouth daily. Indications: GASTROESOPHAGEAL REFLUX    Provider, Historical   multivitamin (ONE A DAY) tablet Take 1 Tab by mouth daily. Indications: VITAMIN DEFICIENCY PREVENTION    Provider, Historical         Review of Systems   Musculoskeletal: Positive for neck pain.         Muscle spasm       Objective:     Patient-Reported Vitals 8/10/2020   Patient-Reported Weight 170lb        [INSTRUCTIONS:  \"[x]\" Indicates a positive item  \"[]\" Indicates a negative item  -- DELETE ALL ITEMS NOT EXAMINED]    Constitutional: [x] Appears well-developed and well-nourished [x] No apparent distress      [] Abnormal -     Mental status: [x] Alert and awake  [x] Oriented to person/place/time [x] Able to follow commands    [] Abnormal -     Eyes:   EOM    [x]  Normal    [] Abnormal -   Sclera  [x]  Normal    [] Abnormal -          Discharge [x]  None visible   [] Abnormal -     HENT: [x] Normocephalic, atraumatic  [] Abnormal -   [x] Mouth/Throat: Mucous membranes are moist    External Ears [x] Normal  [] Abnormal -    Neck: [x] No visualized mass [] Abnormal -     Pulmonary/Chest: [x] Respiratory effort normal   [x] No visualized signs of difficulty breathing or respiratory distress        [] Abnormal -      Musculoskeletal:   [x] Normal gait with no signs of ataxia         [x] Normal range of motion of neck        [] Abnormal -     Neurological:        [x] No Facial Asymmetry (Cranial nerve 7 motor function) (limited exam due to video visit)          [x] No gaze palsy        [] Abnormal -          Skin:        [x] No significant exanthematous lesions or discoloration noted on facial skin         [] Abnormal -            Psychiatric:       [x] Normal Affect [] Abnormal -        [x] No Hallucinations    Other pertinent observable physical exam findings:-        We discussed the expected course, resolution and complications of the diagnosis(es) in detail. Medication risks, benefits, costs, interactions, and alternatives were discussed as indicated. I advised her to contact the office if her condition worsens, changes or fails to improve as anticipated. She expressed understanding with the diagnosis(es) and plan. Claudia Squires, was evaluated through a synchronous (real-time) audio-video encounter. The patient (or guardian if applicable) is aware that this is a billable service. Verbal consent to proceed has been obtained within the past 12 months. The visit was conducted pursuant to the emergency declaration under the [facility], [address] [place] [address] authority and the [employer] General Act. Patient identification was verified, and a caregiver was present when appropriate. The patient was located in a state where the provider was credentialed to provide care.       Fouzia Pate MD

## 2021-05-24 NOTE — PROGRESS NOTES
Kary Walsh is a 50 y.o. female    Chief Complaint   Patient presents with    Neck Pain     1. Have you been to the ER, urgent care clinic since your last visit? Hospitalized since your last visit? No      2. Have you seen or consulted any other health care providers outside of the [address] [place] [address] since your last visit? Include any pap smears or colon screening.   No

## 2021-07-28 DIAGNOSIS — M26.609 TEMPOROMANDIBULAR JOINT DISORDER (TMJ): ICD-10-CM

## 2021-07-28 DIAGNOSIS — M62.838 NECK MUSCLE SPASM: ICD-10-CM

## 2021-07-28 DIAGNOSIS — M54.9 UPPER BACK PAIN: ICD-10-CM

## 2021-07-29 RX ORDER — CYCLOBENZAPRINE HCL 5 MG
5 TABLET ORAL
Qty: 30 TABLET | Refills: 0 | Status: SHIPPED | OUTPATIENT
Start: 2021-07-29

## 2021-09-24 ENCOUNTER — TRANSCRIBE ORDER (OUTPATIENT)
Dept: SCHEDULING | Age: 46
End: 2021-09-24

## 2021-09-24 DIAGNOSIS — Z12.31 SCREENING MAMMOGRAM FOR HIGH-RISK PATIENT: Primary | ICD-10-CM

## 2021-09-28 NOTE — PROGRESS NOTES
Additional Notes: Patient is unsure of medication names or doses, will bring in updated list at next ov Pt had undergone ACD and F C5,6 C67 some years earlier  There was cord compression and a large HNP at [facility].  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

## 2021-10-04 ENCOUNTER — HOSPITAL ENCOUNTER (OUTPATIENT)
Dept: MAMMOGRAPHY | Age: 46
Discharge: HOME | End: 2021-10-04
Attending: OBSTETRICS & GYNECOLOGY
Payer: COMMERCIAL

## 2021-10-04 DIAGNOSIS — Z12.31 SCREENING MAMMOGRAM FOR HIGH-RISK PATIENT: ICD-10-CM

## 2021-10-04 PROCEDURE — 77063 BREAST TOMOSYNTHESIS BI: CPT

## 2021-11-08 RX ORDER — AMLODIPINE BESYLATE 5 MG/1
TABLET ORAL
Qty: 30 TABLET | Refills: 1 | Status: SHIPPED | OUTPATIENT
Start: 2021-11-08 | End: 2022-01-05

## 2022-01-05 RX ORDER — AMLODIPINE BESYLATE 5 MG/1
TABLET ORAL
Qty: 30 TABLET | Refills: 1 | Status: SHIPPED | OUTPATIENT
Start: 2022-01-05 | End: 2022-07-05 | Stop reason: SDUPTHER

## 2022-03-04 ENCOUNTER — OFFICE VISIT (OUTPATIENT)
Dept: NEUROLOGY | Age: 47
End: 2022-03-04
Payer: COMMERCIAL

## 2022-03-04 VITALS
HEART RATE: 98 BPM | HEIGHT: 64 IN | DIASTOLIC BLOOD PRESSURE: 86 MMHG | WEIGHT: 172 LBS | SYSTOLIC BLOOD PRESSURE: 132 MMHG | BODY MASS INDEX: 29.37 KG/M2 | OXYGEN SATURATION: 98 %

## 2022-03-04 DIAGNOSIS — G43.809 MIGRAINE VARIANT: ICD-10-CM

## 2022-03-04 DIAGNOSIS — G43.709 CHRONIC MIGRAINE W/O AURA W/O STATUS MIGRAINOSUS, NOT INTRACTABLE: Primary | ICD-10-CM

## 2022-03-04 PROCEDURE — 99214 OFFICE O/P EST MOD 30 MIN: CPT | Performed by: PSYCHIATRY & NEUROLOGY

## 2022-03-04 RX ORDER — IBUPROFEN 800 MG/1
800 TABLET ORAL
COMMUNITY
Start: 2021-05-12

## 2022-03-04 RX ORDER — FLUTICASONE PROPIONATE 50 MCG
SPRAY, SUSPENSION (ML) NASAL
COMMUNITY
Start: 2022-02-16

## 2022-03-04 RX ORDER — DICLOFENAC SODIUM 75 MG/1
75 TABLET, DELAYED RELEASE ORAL
COMMUNITY
Start: 2021-04-09 | End: 2022-03-04

## 2022-03-04 RX ORDER — OXYBUTYNIN CHLORIDE 5 MG/1
5 TABLET ORAL 3 TIMES DAILY
COMMUNITY

## 2022-03-04 RX ORDER — DOCUSATE SODIUM 100 MG/1
100 CAPSULE, LIQUID FILLED ORAL
COMMUNITY
Start: 2021-05-12

## 2022-03-04 RX ORDER — ACETAMINOPHEN 500 MG
1000 TABLET ORAL
COMMUNITY
Start: 2021-05-12

## 2022-03-04 RX ORDER — AMITRIPTYLINE HYDROCHLORIDE 10 MG/1
TABLET, FILM COATED ORAL
Qty: 270 TABLET | Refills: 2 | Status: SHIPPED | OUTPATIENT
Start: 2022-03-04

## 2022-03-04 NOTE — PROGRESS NOTES
Chief Complaint   Patient presents with    Follow-up     vertigo, sinus headaches       HPI        Tressa Alejandro is a 57-year-old woman following up. When I saw her last she was having a higher frequency of migraines so we continued amitriptyline and added propranolol. Unfortunately she had dizziness of propranolol so we stopped it. Over the ensuing several weeks to months she got better and actually stopped amitriptyline as well. Headaches were gone. However in the last several weeks she has been suffering with sinus-like pathology. She had a return of vertigo and headache. She has resumed amitriptyline just 3 nights ago. She also went through a course of antibiotics. She is feeling a little bit better now. [facility] Hx: Elavil, prop    Mortgage  Mrs. Luis Alfredo Auguste is a 68-year-old woman here to follow-up on chronic migraine. When I saw her last year she was doing very well with 1 or 2 severe headaches per month. Since I saw her last she did suffer COVID diagnosis in August manifesting as some sinus pressure but nothing acutely serious. She had increasing blood pressure during this time. As a result she has had more headaches migraine type up to 3 days/week diffuse throbbing pain with nausea and light sensitivity. She has been using over-the-counter medications. She has titrated amitriptyline to 30 mg nightly with some modicum of control but still having frequent breakthrough. She works from home now on the computer daily. Review of Systems   Eyes: Positive for photophobia. Negative for double vision. Gastrointestinal: Positive for nausea. Neurological: Positive for headaches. All other systems reviewed and are negative.       Past Medical History:   Diagnosis Date    Dense breasts     GERD (gastroesophageal reflux disease)     Headache     Hypertension     Ill-defined condition     heart murmer    Nausea & vomiting     Sinus congestion      Family History   Problem Relation Age of Onset    Breast Cancer Mother 79    Diabetes Mother     Hypertension Mother     Stroke Mother     Cancer Father         Prostate    Hypertension Father     Hypertension Sister     Hypertension Brother      Social History     Socioeconomic History    Marital status: SINGLE     Spouse name: Not on file    Number of children: Not on file    Years of education: Not on file    Highest education level: Not on file   Occupational History    Not on file   Tobacco Use    Smoking status: Never Smoker    Smokeless tobacco: Never Used   Substance and Sexual Activity    Alcohol use: Yes     Alcohol/week: 1.0 standard drink     Types: 1 Glasses of wine per week    Drug use: No    Sexual activity: Yes     Birth control/protection: Condom   Other Topics Concern    Not on file   Social History Narrative    HABITS:  No alcohol abuse,  Occasional alcohol use. No cigarette or drug abuse.           SOCIAL HISTORY:  Patient is single. The mother of a daughter 16and 15years old. 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 Master's from [employer].          FAMILY HISTORY:  Father is 80 with a history of prostate cancer. Mother with diabetes and breast cancer, dyslipidemia and hypertension. Eight brothers and sisters. Patient is a , of twelve years duration. Her  was a principal and . Social Determinants of Health     Financial Resource Strain:     Difficulty of Paying Living Expenses: Not on file   Food Insecurity:     Worried About Running Out of Food in the Last Year: Not on file    Dorota of Food in the Last Year: Not on file   Transportation Needs:     Lack of Transportation (Medical): Not on file    Lack of Transportation (Non-Medical):  Not on file   Physical Activity:     Days of Exercise per Week: Not on file    Minutes of Exercise per Session: Not on file   Stress:     Feeling of Stress : Not on file   Social Connections:     Frequency of Communication with Friends and Family: Not on file    Frequency of Social Gatherings with Friends and Family: Not on file    Attends Confucianism Services: Not on file    Active Member of Clubs or Organizations: Not on file    Attends Club or Organization Meetings: Not on file    Marital Status: Not on file   Intimate Partner Violence:     Fear of Current or Ex-Partner: Not on file    Emotionally Abused: Not on file    Physically Abused: Not on file    Sexually Abused: Not on file   Housing Stability:     Unable to Pay for Housing in the Last Year: Not on file    Number of Places Lived in the Last Year: Not on file    Unstable Housing in the Last Year: Not on file     Allergies   Allergen Reactions    Metronidazole Rash    Stadol [Butorphanol Tartrate] Palpitations    Butorphanol Palpitations         Current Outpatient Medications   Medication Sig    acetaminophen (TYLENOL) 500 mg tablet 1,000 mg.    docusate sodium (COLACE) 100 mg capsule 100 mg.    fluticasone propionate (FLONASE) 50 mcg/actuation nasal spray USE 2 SPRAYS IN EACH NOSTRIL EVERY DAY    ibuprofen (MOTRIN) 800 mg tablet 800 mg.    oxybutynin (DITROPAN) 5 mg tablet Take 5 mg by mouth three (3) times daily.  amitriptyline (ELAVIL) 10 mg tablet TAKE 3 TABLETS BY MOUTH NIGHTLY    cyclobenzaprine (FLEXERIL) 5 mg tablet Take 1 Tablet by mouth three (3) times daily as needed for Muscle Spasm(s).  amLODIPine (NORVASC) 10 mg tablet Take 1 Tab by mouth daily. Indication: high blood pressure    herbal drugs (COLON HERBAL CLEANSER PO) Take 1 Cap by mouth daily.  omeprazole (PRILOSEC) 20 mg capsule Take 20 mg by mouth daily. Indications: gastroesophageal reflux disease    multivitamin (ONE A DAY) tablet Take 1 Tab by mouth daily. Indications: VITAMIN DEFICIENCY PREVENTION    amLODIPine (NORVASC) 5 mg tablet TAKE 1 TABLET BY MOUTH EVERY DAY     No current facility-administered medications for this visit. Neurologic Exam     Mental Status   WD/WN adult in NAD, normal grooming  VSS  A&O x 3    PERRL, nonicteric  Face is symmetric, tongue midline  Speech is fluent and clear  No limb ataxia. No abnl movements. Moving all extemities spontaneously and symmetric  Normal gait    CVS RRR  Lungs nonlabored  Skin is warm and dry         Visit Vitals  /86 (BP 1 Location: Right upper arm, BP Patient Position: Sitting)   Pulse 98   Ht 5' 4\" (1.626 m)   Wt 172 lb (78 kg)   LMP 11/06/2019 (Approximate)   SpO2 98%   BMI 29.52 kg/m²       Assessment and Plan   Diagnoses and all orders for this visit:    1. Chronic migraine w/o aura w/o status migrainosus, not intractable    2. Migraine variant    Other orders  -     amitriptyline (ELAVIL) 10 mg tablet; TAKE 3 TABLETS BY MOUTH NIGHTLY       43-year-old woman with a history of chronic migraine and migraine variant who has had a recurrence of symptoms coinciding with sinus issues. I think she should stay on amitriptyline for now 30 mg only. I placed the refill. She is managing her sinus symptoms. I think if things continue to stay stable at some point she could consider reducing amitriptyline but I would not be too aggressive. I would like her to follow-up in 6 months however if she feels perfectly asymptomatic she could reschedule. 30 minutes of time was taken in total reviewing the medical record, face-to-face time discussing the medication changes and symptoms, and time completing medical documentation today. I reviewed and decided to continue the current medications. This clinical note was dictated with an electronic dictation software that can make unintentional errors. If there are any questions, please contact me directly for clarification.       2 [facility]ly [facility], [facility] [address]  Diplomate ALYSHA

## 2022-03-04 NOTE — PROGRESS NOTES
Chief Complaint   Patient presents with    Follow-up     vertigo, sinus headaches     Visit Vitals  /86 (BP 1 Location: Right upper arm, BP Patient Position: Sitting)   Pulse 98   Ht 5' 4\" (1.626 m)   Wt 172 lb (78 kg)   SpO2 98%   BMI 29.52 kg/m²

## 2022-03-20 PROBLEM — R53.1 WEAKNESS: Status: ACTIVE | Noted: 2019-11-01

## 2022-03-20 PROBLEM — G95.20 COMPRESSION OF SPINAL CORD (HCC): Status: ACTIVE | Noted: 2019-11-03

## 2022-07-05 RX ORDER — AMLODIPINE BESYLATE 5 MG/1
5 TABLET ORAL DAILY
Qty: 30 TABLET | Refills: 3 | Status: SHIPPED | OUTPATIENT
Start: 2022-07-05 | End: 2022-10-31

## 2022-09-15 ENCOUNTER — TRANSCRIBE ORDER (OUTPATIENT)
Dept: SCHEDULING | Age: 47
End: 2022-09-15

## 2022-09-15 DIAGNOSIS — Z12.31 SCREENING MAMMOGRAM FOR HIGH-RISK PATIENT: Primary | ICD-10-CM

## 2022-11-11 ENCOUNTER — HOSPITAL ENCOUNTER (OUTPATIENT)
Dept: MAMMOGRAPHY | Age: 47
Discharge: HOME | End: 2022-11-11
Attending: OBSTETRICS & GYNECOLOGY
Payer: COMMERCIAL

## 2022-11-11 DIAGNOSIS — Z12.31 SCREENING MAMMOGRAM FOR HIGH-RISK PATIENT: ICD-10-CM

## 2022-11-11 PROCEDURE — 77063 BREAST TOMOSYNTHESIS BI: CPT

## 2023-01-16 ENCOUNTER — HOSPITAL ENCOUNTER (EMERGENCY)
Age: 48
Discharge: HOME | End: 2023-01-16
Attending: EMERGENCY MEDICINE
Payer: COMMERCIAL

## 2023-01-16 VITALS
HEART RATE: 70 BPM | TEMPERATURE: 97.9 F | OXYGEN SATURATION: 100 % | RESPIRATION RATE: 16 BRPM | DIASTOLIC BLOOD PRESSURE: 94 MMHG | SYSTOLIC BLOOD PRESSURE: 157 MMHG

## 2023-01-16 DIAGNOSIS — K64.9 HEMORRHOIDS, UNSPECIFIED HEMORRHOID TYPE: Primary | ICD-10-CM

## 2023-01-16 PROCEDURE — 99283 EMERGENCY DEPT VISIT LOW MDM: CPT

## 2023-01-16 RX ORDER — OXYCODONE AND ACETAMINOPHEN 5; 325 MG/1; MG/1
1 TABLET ORAL
Qty: 12 TABLET | Refills: 0 | Status: SHIPPED | OUTPATIENT
Start: 2023-01-16 | End: 2023-01-19

## 2023-01-16 RX ORDER — HYDROCORTISONE 25 MG/G
CREAM TOPICAL 4 TIMES DAILY
Qty: 30 G | Refills: 0 | Status: SHIPPED | OUTPATIENT
Start: 2023-01-16

## 2023-01-16 RX ORDER — NAPROXEN 500 MG/1
500 TABLET ORAL
Qty: 20 TABLET | Refills: 0 | Status: SHIPPED | OUTPATIENT
Start: 2023-01-16 | End: 2023-01-16 | Stop reason: ALTCHOICE

## 2023-01-16 NOTE — ED PROVIDER NOTES
HPI   Patient is a 50 y.o. F who presents today with complaints of hemorrhoid. She had this in the past and had hemorrhoidectomy. Has had rectal bleeding for 7 days, waxes and wantes with how much. Bleeding at all times, even when she is not having a bowel movement, having to wearing a pad, changing every 12 hours. Reports presently, bleeding is improved. Denies pain with bowel movement but does report pain with wiping. Denies syncope, abdominal pain, nausea, vomiting, dysuria. Does not take any anticoagulation. PMH: HTN  Surgical History: See below. Smoking: None  Alcohol: None  Drug Use: None  ALLERGIES: Metronidazole, Stadol [butorphanol tartrate], and Butorphanol    Past Medical History:   Diagnosis Date    Dense breasts     GERD (gastroesophageal reflux disease)     Headache     Hypertension     Ill-defined condition     heart murmer    Menopause     LMP-2021    Nausea & vomiting     Sinus congestion      Past Surgical History:   Procedure Laterality Date    HX BACK SURGERY  2016    HX BUNIONECTOMY Bilateral     HX GYN  2002    D&C    HX HYSTERECTOMY  05/01/2021    HX OTHER SURGICAL  2016    sinus    US GUIDE ASP BREAST RT CYST W NDL Right 4 years ago ? Benign     Family History:   Problem Relation Age of Onset    Breast Cancer Mother 79    Diabetes Mother     Hypertension Mother     Stroke Mother     Cancer Father         Prostate    Hypertension Father     Hypertension Sister     Hypertension Brother      Social History     Socioeconomic History    Marital status: SINGLE     Spouse name: Not on file    Number of children: Not on file    Years of education: Not on file    Highest education level: Not on file   Occupational History    Not on file   Tobacco Use    Smoking status: Never    Smokeless tobacco: Never   Substance and Sexual Activity    Alcohol use:  Yes     Alcohol/week: 1.0 standard drink     Types: 1 Glasses of wine per week    Drug use: No    Sexual activity: Yes     Birth control/protection: Condom   Other Topics Concern    Not on file   Social History Narrative    HABITS:  No alcohol abuse,  Occasional alcohol use. No cigarette or drug abuse. SOCIAL HISTORY:  Patient is single. The mother of a daughter 16and 15years old. 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 FAMILY HISTORY:  Father is 80 with a history of prostate cancer. Mother with diabetes and breast cancer, dyslipidemia and hypertension. Eight brothers and sisters. Patient is a , of twelve years duration. Her  was a principal and . Social Determinants of Health     Financial Resource Strain: Not on file   Food Insecurity: Not on file   Transportation Needs: Not on file   Physical Activity: Not on file   Stress: Not on file   Social Connections: Not on file   Intimate Partner Violence: Not on file   Housing Stability: Not on file           Review of Systems   Constitutional:  Negative for fever. Cardiovascular:  Negative for chest pain. Gastrointestinal:  Positive for anal bleeding, blood in stool and rectal pain. Negative for abdominal pain, diarrhea, nausea and vomiting. Vitals:    01/16/23 0859   BP: (!) 157/94   Pulse: 70   Resp: 16   Temp: 97.9 °F (36.6 °C)   SpO2: 100%            Physical Exam  Vitals and nursing note reviewed. Constitutional:       General: She is not in acute distress. Appearance: Normal appearance. She is not ill-appearing, toxic-appearing or diaphoretic. HENT:      Head: Normocephalic and atraumatic. Nose: Nose normal.      Mouth/Throat:      Mouth: Mucous membranes are moist.   Genitourinary:     Rectum: External hemorrhoid present. No anal fissure. Comments: No bleeding  Musculoskeletal:         General: Normal range of motion. Cervical back: Normal range of motion. Skin:     General: Skin is warm and dry.    Neurological: Mental Status: She is alert and oriented to person, place, and time. Mental status is at baseline. Psychiatric:         Mood and Affect: Mood normal.         Behavior: Behavior normal.         Thought Content: Thought content normal.            LABORATORY RESULTS:  No results found for this or any previous visit (from the past 24 hour(s)). IMAGING RESULTS:  No results found. MEDICATIONS GIVEN:  Medications - No data to display         MDM    Seen today for rectal bleeding, pain with wiping. She does have history of hemorrhoids with hemorrhoidectomy. Reports rectal bleeding is essentially resolved today but wanted to be evaluated. On exam, I do note several hemorrhoids, no evidence of thrombosed hemorrhoid, tenderness to palpation. No bleeding during my exam.  Considered fissure as etiology, no evidence of this on exam.  Also considered constipation. Patient denies this and does take daily stool softener. Will discharge with Anusol, sitz bath's, given colorectal follow-up. Discussed results and work-up with patient and answered all questions, the patient expresses understanding and agrees with the care plan and disposition. The patient was given an opportunity to ask questions and all concerns raised were addressed prior to discharge. Recommended patient follow-up with provider as listed below. Counseled patient on standard home and self-care measures. Specifically explained the emergent conditions that could arise and clearly instructed the patient to return to the emergency department for those and any other new, worsening, or concerning symptoms. Patient stable and ready for discharge. IMPRESSION:  1.  Hemorrhoids, unspecified hemorrhoid type        DISPOSITION:  Discharge    PLAN:  Follow-up Information       Follow up With Specialties Details Why Contact Info    Geovanny Butler MD Colon and Rectal Surgery Schedule an appointment as soon as possible for a visit   [address] [place]  [address] [place]   [phone]            Discharge Medication List as of 1/16/2023 10:00 AM        START taking these medications    Details   hydrocortisone (Anusol-HC) 2.5 % rectal cream Insert  into rectum four (4) times daily. , Normal, Disp-30 g, R-0      oxyCODONE-acetaminophen (Percocet) 5-325 mg per tablet Take 1 Tablet by mouth every six (6) hours as needed for Pain for up to 3 days. Max Daily Amount: 4 Tablets., Normal, Disp-12 Tablet, R-0           CONTINUE these medications which have NOT CHANGED    Details   !! amLODIPine (NORVASC) 5 mg tablet TAKE 1 TABLET BY MOUTH EVERY DAY, Normal, Disp-90 Tablet, R-3      acetaminophen (TYLENOL) 500 mg tablet 1,000 mg., Historical Med      docusate sodium (COLACE) 100 mg capsule 100 mg., Historical Med      fluticasone propionate (FLONASE) 50 mcg/actuation nasal spray USE 2 SPRAYS IN EACH NOSTRIL EVERY DAY, Historical Med      ibuprofen (MOTRIN) 800 mg tablet 800 mg., Historical Med      oxybutynin (DITROPAN) 5 mg tablet Take 5 mg by mouth three (3) times daily. , Historical Med      amitriptyline (ELAVIL) 10 mg tablet TAKE 3 TABLETS BY MOUTH NIGHTLY, Normal, Disp-270 Tablet, R-2      cyclobenzaprine (FLEXERIL) 5 mg tablet Take 1 Tablet by mouth three (3) times daily as needed for Muscle Spasm(s). , Normal, Disp-30 Tablet, R-0      !! amLODIPine (NORVASC) 10 mg tablet Take 1 Tab by mouth daily. Indication: high blood pressure, Normal, Disp-90 Tab,R-1      herbal drugs (COLON HERBAL CLEANSER PO) Take 1 Cap by mouth daily. , Historical Med      omeprazole (PRILOSEC) 20 mg capsule Take 20 mg by mouth daily. Indications: gastroesophageal reflux disease, Historical Med      multivitamin (ONE A DAY) tablet Take 1 Tab by mouth daily. Indications: VITAMIN DEFICIENCY PREVENTION, Historical Med       !! - Potential duplicate medications found. Please discuss with provider.             Please note that this dictation was completed with [employer] voice recognition software. Quite often unanticipated grammatical, syntax, homophones, and other interpretive errors are inadvertently transcribed by the computer software. Please disregard these errors. Please excuse any errors that have escaped final proofreading.

## 2023-01-16 NOTE — DISCHARGE INSTRUCTIONS
As discussed, use topical medication as prescribed and take Percocet as needed for pain. Use caution when taking this as it can cause drowsiness, do not drive or operate any heavy machinery. Continue taking her stool softener and follow-up closely with colorectal surgery.

## 2023-05-26 RX ORDER — IBUPROFEN 800 MG/1
800 TABLET ORAL
COMMUNITY
Start: 2021-05-12

## 2023-05-26 RX ORDER — ACETAMINOPHEN 500 MG
1000 TABLET ORAL
COMMUNITY
Start: 2021-05-12

## 2023-05-26 RX ORDER — OXYBUTYNIN CHLORIDE 5 MG/1
5 TABLET ORAL 3 TIMES DAILY
COMMUNITY

## 2023-05-26 RX ORDER — AMITRIPTYLINE HYDROCHLORIDE 10 MG/1
3 TABLET, FILM COATED ORAL NIGHTLY
COMMUNITY
Start: 2022-03-04

## 2023-05-26 RX ORDER — OMEPRAZOLE 20 MG/1
20 CAPSULE, DELAYED RELEASE ORAL DAILY
COMMUNITY

## 2023-05-26 RX ORDER — AMLODIPINE BESYLATE 10 MG/1
10 TABLET ORAL DAILY
COMMUNITY
Start: 2020-08-31

## 2023-05-26 RX ORDER — FLUTICASONE PROPIONATE 50 MCG
SPRAY, SUSPENSION (ML) NASAL
COMMUNITY
Start: 2022-02-16

## 2023-05-26 RX ORDER — PSEUDOEPHEDRINE HCL 30 MG
100 TABLET ORAL
COMMUNITY
Start: 2021-05-12

## 2023-05-26 RX ORDER — AMLODIPINE BESYLATE 5 MG/1
1 TABLET ORAL DAILY
COMMUNITY
Start: 2022-10-31

## 2023-05-26 RX ORDER — CYCLOBENZAPRINE HCL 5 MG
5 TABLET ORAL 3 TIMES DAILY PRN
COMMUNITY
Start: 2021-07-29

## 2023-08-21 NOTE — TELEPHONE ENCOUNTER
Requesting refill for amitriptyline (ELAVIL) 10 MG tablet be sent to [facility] on file.

## 2023-08-22 RX ORDER — AMITRIPTYLINE HYDROCHLORIDE 10 MG/1
30 TABLET, FILM COATED ORAL NIGHTLY
Qty: 180 TABLET | Refills: 0 | Status: SHIPPED | OUTPATIENT
Start: 2023-08-22

## 2023-08-22 NOTE — TELEPHONE ENCOUNTER
Called patient. Informed her we last sent the medication on 03/04/22 with 2 refills as a 90 day supply. Should of been out sooner. Patient stated she has been out for 2 months. States she had an appointment this month with NP but it was canceled and reschedule due to provider being sick.      LOV: 03/04/22 with Dr. Shayna Nicole  Last refill: 03/04/22 with 2 refills  Next visit: 10/04/23 with NP Marv Kelly

## 2023-09-20 RX ORDER — AMLODIPINE BESYLATE 5 MG/1
5 TABLET ORAL DAILY
Qty: 90 TABLET | OUTPATIENT
Start: 2023-09-20

## 2023-09-21 RX ORDER — AMITRIPTYLINE HYDROCHLORIDE 10 MG/1
TABLET, FILM COATED ORAL
Qty: 180 TABLET | Refills: 0 | OUTPATIENT
Start: 2023-09-21

## 2023-10-12 RX ORDER — AMLODIPINE BESYLATE 5 MG/1
5 TABLET ORAL DAILY
Qty: 90 TABLET | OUTPATIENT
Start: 2023-10-12

## 2023-10-12 RX ORDER — AMITRIPTYLINE HYDROCHLORIDE 10 MG/1
TABLET, FILM COATED ORAL
Qty: 180 TABLET | Refills: 0 | Status: SHIPPED | OUTPATIENT
Start: 2023-10-12

## 2023-11-16 ENCOUNTER — HOSPITAL ENCOUNTER (OUTPATIENT)
Facility: HOSPITAL | Age: 48
Discharge: HOME | End: 2023-11-16
Attending: OBSTETRICS & GYNECOLOGY
Payer: COMMERCIAL

## 2023-11-16 VITALS — HEIGHT: 64 IN | WEIGHT: 172 LBS | BODY MASS INDEX: 29.37 KG/M2

## 2023-11-16 DIAGNOSIS — Z12.31 VISIT FOR SCREENING MAMMOGRAM: ICD-10-CM

## 2023-11-16 PROCEDURE — 77063 BREAST TOMOSYNTHESIS BI: CPT

## 2024-11-05 ENCOUNTER — TRANSCRIBE ORDERS (OUTPATIENT)
Facility: HOSPITAL | Age: 49
End: 2024-11-05

## 2024-11-05 DIAGNOSIS — Z12.31 VISIT FOR SCREENING MAMMOGRAM: Primary | ICD-10-CM

## 2024-12-09 ENCOUNTER — HOSPITAL ENCOUNTER (OUTPATIENT)
Facility: HOSPITAL | Age: 49
Discharge: HOME | End: 2024-12-12
Attending: OBSTETRICS & GYNECOLOGY
Payer: COMMERCIAL

## 2024-12-09 VITALS — HEIGHT: 64 IN | BODY MASS INDEX: 30.56 KG/M2 | WEIGHT: 179 LBS

## 2024-12-09 DIAGNOSIS — Z12.31 VISIT FOR SCREENING MAMMOGRAM: ICD-10-CM

## 2024-12-09 PROCEDURE — 77063 BREAST TOMOSYNTHESIS BI: CPT

## 2025-04-02 SDOH — HEALTH STABILITY: PHYSICAL HEALTH: ON AVERAGE, HOW MANY DAYS PER WEEK DO YOU ENGAGE IN MODERATE TO STRENUOUS EXERCISE (LIKE A BRISK WALK)?: 3 DAYS

## 2025-04-02 SDOH — HEALTH STABILITY: PHYSICAL HEALTH: ON AVERAGE, HOW MANY MINUTES DO YOU ENGAGE IN EXERCISE AT THIS LEVEL?: 60 MIN

## 2025-04-03 ENCOUNTER — OFFICE VISIT (OUTPATIENT)
Facility: CLINIC | Age: 50
End: 2025-04-03
Payer: COMMERCIAL

## 2025-04-03 VITALS
TEMPERATURE: 98.3 F | SYSTOLIC BLOOD PRESSURE: 127 MMHG | BODY MASS INDEX: 29.88 KG/M2 | HEART RATE: 75 BPM | HEIGHT: 64 IN | DIASTOLIC BLOOD PRESSURE: 80 MMHG | WEIGHT: 175 LBS | OXYGEN SATURATION: 99 % | RESPIRATION RATE: 16 BRPM

## 2025-04-03 DIAGNOSIS — E66.811 CLASS 1 OBESITY DUE TO EXCESS CALORIES WITH SERIOUS COMORBIDITY AND BODY MASS INDEX (BMI) OF 30.0 TO 30.9 IN ADULT: ICD-10-CM

## 2025-04-03 DIAGNOSIS — R09.81 SINUS CONGESTION: ICD-10-CM

## 2025-04-03 DIAGNOSIS — E78.5 HYPERLIPIDEMIA, UNSPECIFIED HYPERLIPIDEMIA TYPE: ICD-10-CM

## 2025-04-03 DIAGNOSIS — I10 PRIMARY HYPERTENSION: ICD-10-CM

## 2025-04-03 DIAGNOSIS — E55.9 VITAMIN D DEFICIENCY: Primary | ICD-10-CM

## 2025-04-03 DIAGNOSIS — G43.909 MIGRAINE WITHOUT STATUS MIGRAINOSUS, NOT INTRACTABLE, UNSPECIFIED MIGRAINE TYPE: ICD-10-CM

## 2025-04-03 DIAGNOSIS — Z76.89 ENCOUNTER TO ESTABLISH CARE: ICD-10-CM

## 2025-04-03 DIAGNOSIS — E66.09 CLASS 1 OBESITY DUE TO EXCESS CALORIES WITH SERIOUS COMORBIDITY AND BODY MASS INDEX (BMI) OF 30.0 TO 30.9 IN ADULT: ICD-10-CM

## 2025-04-03 PROCEDURE — 3074F SYST BP LT 130 MM HG: CPT | Performed by: STUDENT IN AN ORGANIZED HEALTH CARE EDUCATION/TRAINING PROGRAM

## 2025-04-03 PROCEDURE — 3079F DIAST BP 80-89 MM HG: CPT | Performed by: STUDENT IN AN ORGANIZED HEALTH CARE EDUCATION/TRAINING PROGRAM

## 2025-04-03 PROCEDURE — 99204 OFFICE O/P NEW MOD 45 MIN: CPT | Performed by: STUDENT IN AN ORGANIZED HEALTH CARE EDUCATION/TRAINING PROGRAM

## 2025-04-03 RX ORDER — MULTIVITAMIN
1 TABLET ORAL DAILY
COMMUNITY

## 2025-04-03 RX ORDER — SUMATRIPTAN 50 MG/1
25 TABLET, FILM COATED ORAL
Qty: 9 TABLET | Refills: 1 | Status: SHIPPED | OUTPATIENT
Start: 2025-04-03

## 2025-04-03 RX ORDER — FLUTICASONE PROPIONATE 50 MCG
2 SPRAY, SUSPENSION (ML) NASAL DAILY
Qty: 16 G | Refills: 2 | Status: SHIPPED | OUTPATIENT
Start: 2025-04-03

## 2025-04-03 RX ORDER — SUMATRIPTAN 50 MG/1
25 TABLET, FILM COATED ORAL
COMMUNITY
End: 2025-04-03 | Stop reason: SDUPTHER

## 2025-04-03 SDOH — ECONOMIC STABILITY: FOOD INSECURITY: WITHIN THE PAST 12 MONTHS, YOU WORRIED THAT YOUR FOOD WOULD RUN OUT BEFORE YOU GOT MONEY TO BUY MORE.: NEVER TRUE

## 2025-04-03 SDOH — ECONOMIC STABILITY: FOOD INSECURITY: WITHIN THE PAST 12 MONTHS, THE FOOD YOU BOUGHT JUST DIDN'T LAST AND YOU DIDN'T HAVE MONEY TO GET MORE.: NEVER TRUE

## 2025-04-03 ASSESSMENT — PATIENT HEALTH QUESTIONNAIRE - PHQ9
SUM OF ALL RESPONSES TO PHQ QUESTIONS 1-9: 0
1. LITTLE INTEREST OR PLEASURE IN DOING THINGS: NOT AT ALL
SUM OF ALL RESPONSES TO PHQ QUESTIONS 1-9: 0
2. FEELING DOWN, DEPRESSED OR HOPELESS: NOT AT ALL
SUM OF ALL RESPONSES TO PHQ QUESTIONS 1-9: 0
SUM OF ALL RESPONSES TO PHQ QUESTIONS 1-9: 0

## 2025-04-03 NOTE — PROGRESS NOTES
Family Medicine Initial Office Visit  Patient: Yuli Capps  1975, 49 y.o., female  Encounter Date: 4/3/2025      CHIEF COMPLAINT  Chief Complaint   Patient presents with    New Patient     Yuli Capps is an 49 y.o. female who presents as a new patient to [facility] to establish care. Concerns: migraines (since college)  2) elevated bp    Last physical 2 yrs ago.       SUBJECTIVE  Yuli Capps is a very pleasant 49 y.o. female  presenting today for establishing care.     Medical history significant for:  hypertension, DDD of cervical spine, migraines, vertigo, GERD, hysterectomy due to uterine fibroids, history of cervical fusion surgery.        [facility] for [facility]raines with right arm and leg weakness/numbness, dizziness, aphasia.  Noncontrast head CT was negative for hemorrhage, CTA head and neck negative for LVO or significant stenosis.  Lab work obtained showing triglycerides 182, total cholesterol 258, , HDL 60, GFR 68, no A1c  Symptoms most likely secondary to complex migraines, low suspicion for acute stroke/TIA  Was evaluated by neurology and advised to follow-up with her neurologist for headaches and keeping a log      Migraines  Was diagnosed with migraines during college years  Used to have attacks couple of times per year  Since ER visit patient endorses daily headaches  Headaches can last the whole day if she does not use abortive treatment  Headaches accompanied by aura in form of floaters.  Endorses also sensitivity to light  Currently only taking Excedrin, has run out of Imitrex  Imitrex usually effective within 60 minutes of intake  Was seen last by neurologist in 2023  Is trying to get established with [facility]      HTN  Currently on amlodipine 10 mg  Blood pressure not regularly measured at home but does get readings around 140/100  Does not use much salt in her diet    Sinus congestion  Had history of

## 2025-04-03 NOTE — PROGRESS NOTES
Chief Complaint   Patient presents with    New Patient     Yuli Capps is an 49 y.o. female who presents as a new patient to [facility] to establish care. Concerns: migraines (since college)  2) elevated bp    Last physical 2 yrs ago.     \"Have you been to the ER, urgent care clinic since your last visit?  Hospitalized since your last visit?\"    YES - When: approximately 1 months ago.  Where and Why: [facility] ER 3/07/25 not admitted, complex migraine.    “Have you seen or consulted any other health care providers outside of [facility] since your last visit?”    NO        “Have you had a colorectal cancer screening such as a colonoscopy/FIT/Cologuard?    YES - Type: Colonoscopy - Where: local gastro 2021 all ok     Nurse/CMA to request most recent records if not in the chart     No colonoscopy on file  No cologuard on file  No FIT/FOBT on file   No flexible sigmoidoscopy on file         Click Here for Release of Records Request

## 2025-04-03 NOTE — ASSESSMENT & PLAN NOTE
Continue with current regimen of amlodipine 10 mg.  Encouraged to monitor blood pressure at home regularly and bring blood pressure readings to following office visit.  Will adjust regimen if indicated.  DASH diet recommended    Orders:    Albumin/Creatinine Ratio, Urine; Future    External Referral To Nutrition Services

## 2025-04-04 DIAGNOSIS — Z76.89 ENCOUNTER TO ESTABLISH CARE: ICD-10-CM

## 2025-04-04 DIAGNOSIS — I10 PRIMARY HYPERTENSION: ICD-10-CM

## 2025-04-04 DIAGNOSIS — E55.9 VITAMIN D DEFICIENCY: ICD-10-CM

## 2025-04-04 LAB
25(OH)D3 SERPL-MCNC: 31.6 NG/ML (ref 30–100)
CREAT UR-MCNC: 125 MG/DL
EST. AVERAGE GLUCOSE BLD GHB EST-MCNC: 126 MG/DL
HBA1C MFR BLD: 6 % (ref 4–5.6)
MICROALBUMIN UR-MCNC: <0.5 MG/DL
MICROALBUMIN/CREAT UR-RTO: <4 MG/G (ref 0–30)

## 2025-04-06 LAB — TSH SERPL DL<=0.05 MIU/L-ACNC: 2.18 UIU/ML (ref 0.45–4.5)

## 2025-04-09 ENCOUNTER — RESULTS FOLLOW-UP (OUTPATIENT)
Facility: CLINIC | Age: 50
End: 2025-04-09

## 2025-04-21 ENCOUNTER — OFFICE VISIT (OUTPATIENT)
Age: 50
End: 2025-04-21
Payer: COMMERCIAL

## 2025-04-21 VITALS
OXYGEN SATURATION: 98 % | RESPIRATION RATE: 16 BRPM | SYSTOLIC BLOOD PRESSURE: 126 MMHG | HEART RATE: 82 BPM | HEIGHT: 64 IN | BODY MASS INDEX: 29.88 KG/M2 | DIASTOLIC BLOOD PRESSURE: 74 MMHG | WEIGHT: 175 LBS

## 2025-04-21 DIAGNOSIS — G43.109 COMPLICATED MIGRAINE: ICD-10-CM

## 2025-04-21 DIAGNOSIS — G43.709 CHRONIC MIGRAINE WITHOUT AURA, NOT INTRACTABLE, WITHOUT STATUS MIGRAINOSUS: Primary | ICD-10-CM

## 2025-04-21 PROCEDURE — 3074F SYST BP LT 130 MM HG: CPT

## 2025-04-21 PROCEDURE — 99205 OFFICE O/P NEW HI 60 MIN: CPT

## 2025-04-21 PROCEDURE — 3078F DIAST BP <80 MM HG: CPT

## 2025-04-21 RX ORDER — ZOLMITRIPTAN 2.5 MG/1
2.5 SPRAY, METERED NASAL AS NEEDED
Qty: 6 EACH | Refills: 1 | Status: SHIPPED | OUTPATIENT
Start: 2025-04-21

## 2025-04-21 RX ORDER — RIZATRIPTAN BENZOATE 10 MG/1
10 TABLET ORAL AS NEEDED
Qty: 9 TABLET | Refills: 3 | Status: SHIPPED | OUTPATIENT
Start: 2025-04-21

## 2025-04-21 ASSESSMENT — PATIENT HEALTH QUESTIONNAIRE - PHQ9
SUM OF ALL RESPONSES TO PHQ QUESTIONS 1-9: 0
SUM OF ALL RESPONSES TO PHQ QUESTIONS 1-9: 0
1. LITTLE INTEREST OR PLEASURE IN DOING THINGS: NOT AT ALL
SUM OF ALL RESPONSES TO PHQ QUESTIONS 1-9: 0
SUM OF ALL RESPONSES TO PHQ QUESTIONS 1-9: 0
2. FEELING DOWN, DEPRESSED OR HOPELESS: NOT AT ALL

## 2025-04-21 ASSESSMENT — VISUAL ACUITY: VA_NORMAL: 1

## 2025-04-21 ASSESSMENT — ENCOUNTER SYMPTOMS: PHOTOPHOBIA: 1

## 2025-04-21 NOTE — PROGRESS NOTES
Chief Complaint   Patient presents with    New Patient    Migraine     Patient reports migraines with nausea and light sensitivity     Vitals:    04/21/25 0905   BP: 126/74   Pulse: 82   Resp: 16   SpO2: 98%       
Years of education: Not on file    Highest education level: Not on file   Occupational History    Not on file   Tobacco Use    Smoking status: Never    Smokeless tobacco: Never   Vaping Use    Vaping status: Never Used   Substance and Sexual Activity    Alcohol use: Yes     Alcohol/week: 3.0 standard drinks of alcohol     Types: 3 Glasses of wine per week    Drug use: No    Sexual activity: Yes     Partners: Male     Birth control/protection: Condom   Other Topics Concern    Not on file   Social History Narrative    jj.  Master's from [employer].      FAMILY HISTORY:  Father is 92 with a history of prostate cancer.  Mother with diabetes and breast cancer, dyslipidemia and hypertension.    Eight brothers and sisters.  Patient is a , of twelve years dur    HABITS:  No alcohol abuse,  Occasional alcohol use.  No cigarette or drug abuse.       SOCIAL HISTORY:  Patient is single.  The mother of a daughter 17 and 12 years old.  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     Social Drivers of Health     Financial Resource Strain: Not on file   Food Insecurity: No Food Insecurity (4/3/2025)    Hunger Vital Sign     Worried About Running Out of Food in the Last Year: Never true     Ran Out of Food in the Last Year: Never true   Transportation Needs: No Transportation Needs (4/3/2025)    PRAPARE - Transportation     Lack of Transportation (Medical): No     Lack of Transportation (Non-Medical): No   Physical Activity: Sufficiently Active (4/2/2025)    Exercise Vital Sign     Days of Exercise per Week: 3 days     Minutes of Exercise per Session: 60 min   Stress: Not on file   Social Connections: Not on file   Intimate Partner Violence: Not on file   Housing Stability: Low Risk  (4/3/2025)    Housing Stability Vital Sign     Unable to Pay for Housing in the Last Year: No     Number of Times Moved in the Last Year: 0

## 2025-04-28 DIAGNOSIS — G43.709 CHRONIC MIGRAINE WITHOUT AURA, NOT INTRACTABLE, WITHOUT STATUS MIGRAINOSUS: Primary | ICD-10-CM

## 2025-04-28 RX ORDER — AMITRIPTYLINE HYDROCHLORIDE 10 MG/1
10 TABLET ORAL 2 TIMES DAILY
Qty: 180 TABLET | Refills: 1 | Status: SHIPPED | OUTPATIENT
Start: 2025-04-28

## 2025-05-12 ENCOUNTER — TELEMEDICINE (OUTPATIENT)
Facility: CLINIC | Age: 50
End: 2025-05-12
Payer: COMMERCIAL

## 2025-05-12 DIAGNOSIS — I10 PRIMARY HYPERTENSION: Primary | ICD-10-CM

## 2025-05-12 DIAGNOSIS — R73.03 PREDIABETES: ICD-10-CM

## 2025-05-12 DIAGNOSIS — G43.109 MIGRAINE WITH AURA AND WITHOUT STATUS MIGRAINOSUS, NOT INTRACTABLE: ICD-10-CM

## 2025-05-12 DIAGNOSIS — E78.2 MIXED HYPERLIPIDEMIA: ICD-10-CM

## 2025-05-12 DIAGNOSIS — Z12.31 ENCOUNTER FOR SCREENING MAMMOGRAM FOR MALIGNANT NEOPLASM OF BREAST: ICD-10-CM

## 2025-05-12 DIAGNOSIS — M25.552 PAIN OF LEFT HIP: ICD-10-CM

## 2025-05-12 PROCEDURE — 99214 OFFICE O/P EST MOD 30 MIN: CPT | Performed by: STUDENT IN AN ORGANIZED HEALTH CARE EDUCATION/TRAINING PROGRAM

## 2025-05-12 SDOH — ECONOMIC STABILITY: FOOD INSECURITY: WITHIN THE PAST 12 MONTHS, THE FOOD YOU BOUGHT JUST DIDN'T LAST AND YOU DIDN'T HAVE MONEY TO GET MORE.: NEVER TRUE

## 2025-05-12 SDOH — ECONOMIC STABILITY: FOOD INSECURITY: WITHIN THE PAST 12 MONTHS, YOU WORRIED THAT YOUR FOOD WOULD RUN OUT BEFORE YOU GOT MONEY TO BUY MORE.: NEVER TRUE

## 2025-05-12 ASSESSMENT — PATIENT HEALTH QUESTIONNAIRE - PHQ9
SUM OF ALL RESPONSES TO PHQ QUESTIONS 1-9: 0
1. LITTLE INTEREST OR PLEASURE IN DOING THINGS: NOT AT ALL
2. FEELING DOWN, DEPRESSED OR HOPELESS: NOT AT ALL
SUM OF ALL RESPONSES TO PHQ QUESTIONS 1-9: 0

## 2025-05-12 NOTE — ASSESSMENT & PLAN NOTE
Monitored by specialist- no acute findings meriting change in the plan  Continue with Imitrex or Maxalt as needed

## 2025-05-12 NOTE — PROGRESS NOTES
Yuli Capps, was evaluated through a synchronous (real-time) audio-video encounter. The patient (or guardian if applicable) is aware that this is a billable service, which includes applicable co-pays. This Virtual Visit was conducted with patient's (and/or legal guardian's) consent. Patient identification was verified, and a caregiver was present when appropriate.   The patient was located at Other: In parked vehicle, in [place]  Provider was located at Home (Appt Dept State): [place]  Confirm you are appropriately licensed, registered, or certified to deliver care in the state where the patient is located as indicated above. If you are not or unsure, please re-schedule the visit: Yes, I confirm.     Yuli Capps (:  1975) is a Established patient, presenting virtually for evaluation of the following:      Below is the assessment and plan developed based on review of pertinent history, physical exam, labs, studies, and medications.     Assessment & Plan  Primary hypertension  Chronic, at goal.  Continue with amlodipine 10 mg.  DASH diet recommended         Prediabetes  A1c 6%.  Recommend carbohydrate/sugar restricted diet and regular physical activity         Mixed hyperlipidemia  Mixed hyperlipidemia, ASCVD risk 4.1%.  Follow heart healthy diet and perform regular physical activity of moderate intensity for 150 minutes/week         Migraine with aura and without status migrainosus, not intractable   Monitored by specialist- no acute findings meriting change in the plan  Continue with Imitrex or Maxalt as needed       Pain of left hip  Based on history obtained suspect trochanteric bursitis of left lower extremity.  Take over-the-counter ibuprofen and/or Tylenol for pain as needed.  Stretching exercises provided via "[employer]hart.  Patient to follow-up in clinic if symptoms worsen or persistent for steroid injection.       Encounter for screening mammogram for malignant neoplasm of breast  To complete after

## 2025-05-12 NOTE — PROGRESS NOTES
Have you been to the ER, urgent care clinic since your last visit?  Hospitalized since your last visit?   NO    Have you seen or consulted any other health care providers outside our system since your last visit?   NO      “Have you had a colorectal cancer screening such as a colonoscopy/FIT/Cologuard?    NO    No colonoscopy on file  No cologuard on file  No FIT/FOBT on file   No flexible sigmoidoscopy on file            Chief Complaint   Patient presents with    Follow-up     Health Maintenance Due   Topic Date Due    HIV screen  Never done    Hepatitis C screen  Never done    Hepatitis B vaccine (1 of 3 - 19+ 3-dose series) Never done    Colorectal Cancer Screen  Never done    Shingles vaccine (1 of 2) Never done    Pneumococcal 50+ years Vaccine (1 of 1 - PCV) Never done    COVID-19 Vaccine (1 - 2024-25 season) Never done    Lipids  11/02/2024     PHQ-9 Total Score: 0 (5/12/2025  8:08 AM)        5/12/2025     8:00 AM   AMB Abuse Screening   Do you ever feel afraid of your partner? N   Are you in a relationship with someone who physically or mentally threatens you? N   Is it safe for you to go home? Y         5/12/2025     8:10 AM   Patient-Reported Vitals   Patient-Reported Weight 175lb   Patient-Reported Height 5'4

## 2025-06-02 DIAGNOSIS — G43.909 MIGRAINE WITHOUT STATUS MIGRAINOSUS, NOT INTRACTABLE, UNSPECIFIED MIGRAINE TYPE: ICD-10-CM

## 2025-06-02 RX ORDER — SUMATRIPTAN 50 MG/1
25 TABLET, FILM COATED ORAL
Qty: 9 TABLET | Refills: 1 | Status: SHIPPED | OUTPATIENT
Start: 2025-06-02

## 2025-06-30 DIAGNOSIS — R09.81 SINUS CONGESTION: ICD-10-CM

## 2025-06-30 RX ORDER — FLUTICASONE PROPIONATE 50 MCG
2 SPRAY, SUSPENSION (ML) NASAL DAILY
Qty: 48 ML | Refills: 1 | Status: SHIPPED | OUTPATIENT
Start: 2025-06-30

## (undated) DEVICE — SPONGE: SPECIALTY PEANUT XR 100/CS: Brand: MEDICAL ACTION INDUSTRIES

## (undated) DEVICE — KIT,1200CC CANISTER,3/16"X6' TUBING: Brand: MEDLINE INDUSTRIES, INC.

## (undated) DEVICE — PREP SKN PREVAIL 40ML APPL --

## (undated) DEVICE — GARMENT,MEDLINE,DVT,INT,CALF,MED, GEN2: Brand: MEDLINE

## (undated) DEVICE — STRAP,POSITIONING,KNEE/BODY,FOAM,4X60": Brand: MEDLINE

## (undated) DEVICE — TUBING, SUCTION, 1/4" X 10', STRAIGHT: Brand: MEDLINE

## (undated) DEVICE — BONE WAX WHITE: Brand: BONE WAX WHITE

## (undated) DEVICE — FLOSEAL HEMOSTATIC MATRIX, 5 ML: Brand: FLOSEAL

## (undated) DEVICE — STERILE POLYISOPRENE POWDER-FREE SURGICAL GLOVES WITH EMOLLIENT COATING: Brand: PROTEXIS

## (undated) DEVICE — COVER,MAYO STAND,STERILE: Brand: MEDLINE

## (undated) DEVICE — SOLUTION IV 1000ML 0.9% SOD CHL

## (undated) DEVICE — DRAPE,LAPAROTOMY,PED,STERILE: Brand: MEDLINE

## (undated) DEVICE — INFECTION CONTROL KIT SYS

## (undated) DEVICE — LAMINECTOMY RICHMOND-LF: Brand: MEDLINE INDUSTRIES, INC.

## (undated) DEVICE — TELFA ADHESIVE ISLAND DRESSING: Brand: TELFA

## (undated) DEVICE — BIPOLAR FORCEPS CORD: Brand: VALLEYLAB

## (undated) DEVICE — DRAPE SURG W41XL74IN CLR FULL SZ C ARM 3 ADH POLY STRP E

## (undated) DEVICE — HANDLE LT SNAP ON ULT DURABLE LENS FOR TRUMPF ALC DISPOSABLE

## (undated) DEVICE — COLLAR FOAM CERV LG 3X18.5IN --

## (undated) DEVICE — INSULATED BLADE ELECTRODE: Brand: EDGE

## (undated) DEVICE — COVER,TABLE,HEAVY DUTY,60"X90",STRL: Brand: MEDLINE

## (undated) DEVICE — SUTURE VCRL SZ 3-0 L18IN ABSRB UD CP-2 L26MM 1/2 CIR REV J761D

## (undated) DEVICE — SCREW EXT FIX L14MM FOR DISTRCTN

## (undated) DEVICE — SURGIFOAM SPNG SZ 100

## (undated) DEVICE — INTENDED FOR TISSUE SEPARATION, AND OTHER PROCEDURES THAT REQUIRE A SHARP SURGICAL BLADE TO PUNCTURE OR CUT.: Brand: BARD-PARKER ® CARBON RIB-BACK BLADES

## (undated) DEVICE — TOOL 14MH30 LEGEND 14CM 3MM: Brand: MIDAS REX ™